# Patient Record
Sex: MALE | Race: WHITE | NOT HISPANIC OR LATINO
[De-identification: names, ages, dates, MRNs, and addresses within clinical notes are randomized per-mention and may not be internally consistent; named-entity substitution may affect disease eponyms.]

---

## 2017-03-14 ENCOUNTER — APPOINTMENT (OUTPATIENT)
Dept: FAMILY MEDICINE | Facility: CLINIC | Age: 77
End: 2017-03-14

## 2017-03-17 ENCOUNTER — APPOINTMENT (OUTPATIENT)
Dept: FAMILY MEDICINE | Facility: CLINIC | Age: 77
End: 2017-03-17

## 2017-03-17 VITALS
TEMPERATURE: 98 F | DIASTOLIC BLOOD PRESSURE: 68 MMHG | SYSTOLIC BLOOD PRESSURE: 116 MMHG | HEART RATE: 68 BPM | RESPIRATION RATE: 20 BRPM

## 2017-03-17 DIAGNOSIS — Z87.09 PERSONAL HISTORY OF OTHER DISEASES OF THE RESPIRATORY SYSTEM: ICD-10-CM

## 2017-12-21 ENCOUNTER — APPOINTMENT (OUTPATIENT)
Dept: FAMILY MEDICINE | Facility: CLINIC | Age: 77
End: 2017-12-21
Payer: MEDICARE

## 2017-12-21 VITALS
SYSTOLIC BLOOD PRESSURE: 114 MMHG | HEART RATE: 68 BPM | HEIGHT: 74 IN | DIASTOLIC BLOOD PRESSURE: 68 MMHG | BODY MASS INDEX: 23.74 KG/M2 | RESPIRATION RATE: 20 BRPM | WEIGHT: 185 LBS

## 2017-12-21 LAB
BASOPHILS # BLD AUTO: 0.01 K/UL
BASOPHILS NFR BLD AUTO: 0.1 %
EOSINOPHIL # BLD AUTO: 0.41 K/UL
EOSINOPHIL NFR BLD AUTO: 5 %
HCT VFR BLD CALC: 44 %
HGB BLD-MCNC: 14.4 G/DL
IMM GRANULOCYTES NFR BLD AUTO: 0.4 %
LYMPHOCYTES # BLD AUTO: 1.94 K/UL
LYMPHOCYTES NFR BLD AUTO: 23.5 %
MAN DIFF?: NORMAL
MCHC RBC-ENTMCNC: 26.7 PG
MCHC RBC-ENTMCNC: 32.7 GM/DL
MCV RBC AUTO: 81.5 FL
MONOCYTES # BLD AUTO: 0.66 K/UL
MONOCYTES NFR BLD AUTO: 8 %
NEUTROPHILS # BLD AUTO: 5.2 K/UL
NEUTROPHILS NFR BLD AUTO: 63 %
PLATELET # BLD AUTO: 233 K/UL
RBC # BLD: 5.4 M/UL
RBC # FLD: 14.1 %
WBC # FLD AUTO: 8.25 K/UL

## 2017-12-21 PROCEDURE — G0439: CPT

## 2017-12-21 PROCEDURE — 93000 ELECTROCARDIOGRAM COMPLETE: CPT | Mod: 59

## 2017-12-21 PROCEDURE — G0008: CPT

## 2017-12-21 PROCEDURE — 36415 COLL VENOUS BLD VENIPUNCTURE: CPT

## 2017-12-21 PROCEDURE — 90688 IIV4 VACCINE SPLT 0.5 ML IM: CPT

## 2017-12-21 PROCEDURE — G0009: CPT

## 2017-12-21 PROCEDURE — 90732 PPSV23 VACC 2 YRS+ SUBQ/IM: CPT

## 2017-12-21 RX ORDER — SULFACETAMIDE SODIUM AND SULFUR 9 %-4.5 %
9-4.5 KIT TOPICAL
Qty: 1 | Refills: 0 | Status: DISCONTINUED | COMMUNITY
Start: 2016-12-16

## 2017-12-30 LAB
ALBUMIN SERPL ELPH-MCNC: 4.6 G/DL
ALP BLD-CCNC: 54 U/L
ALT SERPL-CCNC: 25 U/L
ANION GAP SERPL CALC-SCNC: 19 MMOL/L
AST SERPL-CCNC: 32 U/L
BILIRUB SERPL-MCNC: 0.3 MG/DL
BUN SERPL-MCNC: 20 MG/DL
CALCIUM SERPL-MCNC: 9.5 MG/DL
CHLORIDE SERPL-SCNC: 101 MMOL/L
CHOLEST SERPL-MCNC: 193 MG/DL
CHOLEST/HDLC SERPL: 2.6 RATIO
CO2 SERPL-SCNC: 24 MMOL/L
CREAT SERPL-MCNC: 1.15 MG/DL
GLUCOSE SERPL-MCNC: 91 MG/DL
HBA1C MFR BLD HPLC: 5.5 %
HDLC SERPL-MCNC: 73 MG/DL
LDLC SERPL CALC-MCNC: 100 MG/DL
POTASSIUM SERPL-SCNC: 3.9 MMOL/L
PROT SERPL-MCNC: 7.2 G/DL
PSA FREE FLD-MCNC: 33
PSA FREE SERPL-MCNC: 1.31 NG/ML
PSA SERPL-MCNC: 3.97 NG/ML
SODIUM SERPL-SCNC: 144 MMOL/L
TRIGL SERPL-MCNC: 98 MG/DL

## 2018-05-30 ENCOUNTER — APPOINTMENT (OUTPATIENT)
Dept: SURGERY | Facility: CLINIC | Age: 78
End: 2018-05-30
Payer: MEDICARE

## 2018-05-30 VITALS — BODY MASS INDEX: 24.65 KG/M2 | WEIGHT: 186 LBS | HEIGHT: 73 IN

## 2018-05-30 DIAGNOSIS — Z80.8 FAMILY HISTORY OF MALIGNANT NEOPLASM OF OTHER ORGANS OR SYSTEMS: ICD-10-CM

## 2018-05-30 DIAGNOSIS — K62.9 DISEASE OF ANUS AND RECTUM, UNSPECIFIED: ICD-10-CM

## 2018-05-30 DIAGNOSIS — Z87.19 PERSONAL HISTORY OF OTHER DISEASES OF THE DIGESTIVE SYSTEM: ICD-10-CM

## 2018-05-30 PROCEDURE — 99214 OFFICE O/P EST MOD 30 MIN: CPT

## 2018-05-31 PROBLEM — K62.9 ANAL LESION: Status: ACTIVE | Noted: 2018-05-31

## 2018-12-24 ENCOUNTER — APPOINTMENT (OUTPATIENT)
Dept: FAMILY MEDICINE | Facility: CLINIC | Age: 78
End: 2018-12-24
Payer: MEDICARE

## 2018-12-24 VITALS
DIASTOLIC BLOOD PRESSURE: 70 MMHG | SYSTOLIC BLOOD PRESSURE: 125 MMHG | HEART RATE: 68 BPM | WEIGHT: 183 LBS | RESPIRATION RATE: 20 BRPM | HEIGHT: 73 IN | BODY MASS INDEX: 24.25 KG/M2

## 2018-12-24 PROCEDURE — 36415 COLL VENOUS BLD VENIPUNCTURE: CPT

## 2018-12-24 PROCEDURE — 90674 CCIIV4 VAC NO PRSV 0.5 ML IM: CPT

## 2018-12-24 PROCEDURE — 99397 PER PM REEVAL EST PAT 65+ YR: CPT | Mod: 25

## 2018-12-24 PROCEDURE — 93000 ELECTROCARDIOGRAM COMPLETE: CPT | Mod: 59

## 2018-12-24 PROCEDURE — G0008: CPT

## 2018-12-24 NOTE — HEALTH RISK ASSESSMENT
[No falls in past year] : Patient reported no falls in the past year [0] : 2) Feeling down, depressed, or hopeless: Not at all (0) [Fully functional (bathing, dressing, toileting, transferring, walking, feeding)] : Fully functional (bathing, dressing, toileting, transferring, walking, feeding) [Fully functional (using the telephone, shopping, preparing meals, housekeeping, doing laundry, using] : Fully functional and needs no help or supervision to perform IADLs (using the telephone, shopping, preparing meals, housekeeping, doing laundry, using transportation, managing medications and managing finances) [Discussed at today's visit] : Advance Directives Discussed at today's visit [Designated Healthcare Proxy] : Designated healthcare proxy [Relationship: ___] : Relationship: [unfilled] [DNR] : DNR [DNI] : DNI [] : No

## 2018-12-24 NOTE — PHYSICAL EXAM
[No Acute Distress] : no acute distress [Well Nourished] : well nourished [Well Developed] : well developed [Well-Appearing] : well-appearing [Normal Sclera/Conjunctiva] : normal sclera/conjunctiva [PERRL] : pupils equal round and reactive to light [EOMI] : extraocular movements intact [Normal Outer Ear/Nose] : the outer ears and nose were normal in appearance [Normal Oropharynx] : the oropharynx was normal [Normal TMs] : both tympanic membranes were normal [Normal Nasal Mucosa] : the nasal mucosa was normal [No JVD] : no jugular venous distention [Supple] : supple [No Lymphadenopathy] : no lymphadenopathy [Thyroid Normal, No Nodules] : the thyroid was normal and there were no nodules present [No Respiratory Distress] : no respiratory distress  [Clear to Auscultation] : lungs were clear to auscultation bilaterally [No Accessory Muscle Use] : no accessory muscle use [Normal Rate] : normal rate  [Regular Rhythm] : with a regular rhythm [Normal S1, S2] : normal S1 and S2 [No Murmur] : no murmur heard [No Carotid Bruits] : no carotid bruits [No Abdominal Bruit] : a ~M bruit was not heard ~T in the abdomen [No Varicosities] : no varicosities [Pedal Pulses Present] : the pedal pulses are present [No Edema] : there was no peripheral edema [No Extremity Clubbing/Cyanosis] : no extremity clubbing/cyanosis [No Palpable Aorta] : no palpable aorta [Soft] : abdomen soft [Non Tender] : non-tender [Non-distended] : non-distended [No Masses] : no abdominal mass palpated [No HSM] : no HSM [Normal Bowel Sounds] : normal bowel sounds [No Hernias] : no hernias [Normal Sphincter Tone] : normal sphincter tone [No Mass] : no mass [Penis Abnormality] : normal uncircumcised penis [Testes Tenderness] : no tenderness of the testes [Testes Mass (___cm)] : there were no testicular masses [No Prostate Nodules] : no prostate nodules [Prostate Enlarged] : was enlarged [Prostate Size___ (Scale 0-4)] : prostate size was [unfilled] on a scale of 0-4 [Normal Posterior Cervical Nodes] : no posterior cervical lymphadenopathy [Normal Femoral Nodes] : no femoral lymphadenopathy [Normal Anterior Cervical Nodes] : no anterior cervical lymphadenopathy [Normal Inguinal Nodes] : no inguinal lymphadenopathy [No CVA Tenderness] : no CVA  tenderness [No Spinal Tenderness] : no spinal tenderness [No Joint Swelling] : no joint swelling [Grossly Normal Strength/Tone] : grossly normal strength/tone [No Rash] : no rash [Normal Gait] : normal gait [Coordination Grossly Intact] : coordination grossly intact [No Focal Deficits] : no focal deficits [Speech Grossly Normal] : speech grossly normal [Memory Grossly Normal] : memory grossly normal [Normal Affect] : the affect was normal [Alert and Oriented x3] : oriented to person, place, and time [Normal Mood] : the mood was normal [Normal Insight/Judgement] : insight and judgment were intact [de-identified] : scattered seborrheic keratoses

## 2018-12-24 NOTE — HISTORY OF PRESENT ILLNESS
[FreeTextEntry1] : Requests comprehensive exam [de-identified] : Presents for comprehensive exam.  States feeling well; trying to watch diet; exercises regularly.  Recent Derm visit - await biopsies.  Reviewed immunizations and requests flu vaccine.

## 2018-12-24 NOTE — ASSESSMENT
[FreeTextEntry1] : Comprehensive exam reveals patient to be hemodynamically stable with acceptable BP\par No new issues identified\par Lab profiles sent\par Flu vaccine given L deltoid

## 2018-12-27 ENCOUNTER — RESULT REVIEW (OUTPATIENT)
Age: 78
End: 2018-12-27

## 2018-12-27 LAB
ALBUMIN SERPL ELPH-MCNC: 4.5 G/DL
ALP BLD-CCNC: 53 U/L
ALT SERPL-CCNC: 25 U/L
ANION GAP SERPL CALC-SCNC: 10 MMOL/L
APPEARANCE: CLEAR
AST SERPL-CCNC: 31 U/L
BASOPHILS # BLD AUTO: 0.01 K/UL
BASOPHILS NFR BLD AUTO: 0.2 %
BILIRUB SERPL-MCNC: 0.6 MG/DL
BILIRUBIN URINE: NEGATIVE
BLOOD URINE: NEGATIVE
BUN SERPL-MCNC: 17 MG/DL
CALCIUM SERPL-MCNC: 9.7 MG/DL
CHLORIDE SERPL-SCNC: 101 MMOL/L
CHOLEST SERPL-MCNC: 192 MG/DL
CHOLEST/HDLC SERPL: 2.7 RATIO
CO2 SERPL-SCNC: 29 MMOL/L
COLOR: YELLOW
CREAT SERPL-MCNC: 1.08 MG/DL
EOSINOPHIL # BLD AUTO: 0.22 K/UL
EOSINOPHIL NFR BLD AUTO: 3.4 %
GLUCOSE QUALITATIVE U: NEGATIVE MG/DL
GLUCOSE SERPL-MCNC: 104 MG/DL
HBA1C MFR BLD HPLC: 5.6 %
HCT VFR BLD CALC: 45.4 %
HDLC SERPL-MCNC: 71 MG/DL
HGB BLD-MCNC: 14.7 G/DL
IMM GRANULOCYTES NFR BLD AUTO: 0.2 %
KETONES URINE: NEGATIVE
LDLC SERPL CALC-MCNC: 105 MG/DL
LEUKOCYTE ESTERASE URINE: NEGATIVE
LYMPHOCYTES # BLD AUTO: 1.76 K/UL
LYMPHOCYTES NFR BLD AUTO: 27.2 %
MAN DIFF?: NORMAL
MCHC RBC-ENTMCNC: 26.5 PG
MCHC RBC-ENTMCNC: 32.4 GM/DL
MCV RBC AUTO: 81.8 FL
MONOCYTES # BLD AUTO: 0.45 K/UL
MONOCYTES NFR BLD AUTO: 6.9 %
NEUTROPHILS # BLD AUTO: 4.03 K/UL
NEUTROPHILS NFR BLD AUTO: 62.1 %
NITRITE URINE: NEGATIVE
PH URINE: 6
PLATELET # BLD AUTO: 239 K/UL
POTASSIUM SERPL-SCNC: 4.4 MMOL/L
PROT SERPL-MCNC: 6.8 G/DL
PROTEIN URINE: NEGATIVE MG/DL
PSA SERPL-MCNC: 3.96 NG/ML
RBC # BLD: 5.55 M/UL
RBC # FLD: 14.2 %
SODIUM SERPL-SCNC: 140 MMOL/L
SPECIFIC GRAVITY URINE: 1.02
TRIGL SERPL-MCNC: 81 MG/DL
UROBILINOGEN URINE: NEGATIVE MG/DL
WBC # FLD AUTO: 6.48 K/UL

## 2019-01-10 ENCOUNTER — RX RENEWAL (OUTPATIENT)
Age: 79
End: 2019-01-10

## 2020-01-22 ENCOUNTER — FORM ENCOUNTER (OUTPATIENT)
Age: 80
End: 2020-01-22

## 2020-01-23 ENCOUNTER — OUTPATIENT (OUTPATIENT)
Dept: OUTPATIENT SERVICES | Facility: HOSPITAL | Age: 80
LOS: 1 days | End: 2020-01-23
Payer: MEDICARE

## 2020-01-23 ENCOUNTER — APPOINTMENT (OUTPATIENT)
Dept: ULTRASOUND IMAGING | Facility: HOSPITAL | Age: 80
End: 2020-01-23
Payer: MEDICARE

## 2020-01-23 DIAGNOSIS — Z00.8 ENCOUNTER FOR OTHER GENERAL EXAMINATION: ICD-10-CM

## 2020-01-23 PROCEDURE — 76770 US EXAM ABDO BACK WALL COMP: CPT

## 2020-01-23 PROCEDURE — 76770 US EXAM ABDO BACK WALL COMP: CPT | Mod: 26

## 2020-01-24 ENCOUNTER — APPOINTMENT (OUTPATIENT)
Dept: FAMILY MEDICINE | Facility: CLINIC | Age: 80
End: 2020-01-24
Payer: MEDICARE

## 2020-01-24 VITALS — DIASTOLIC BLOOD PRESSURE: 70 MMHG | SYSTOLIC BLOOD PRESSURE: 118 MMHG | RESPIRATION RATE: 20 BRPM | HEART RATE: 68 BPM

## 2020-01-24 DIAGNOSIS — Z87.438 PERSONAL HISTORY OF OTHER DISEASES OF MALE GENITAL ORGANS: ICD-10-CM

## 2020-01-24 PROCEDURE — 99213 OFFICE O/P EST LOW 20 MIN: CPT | Mod: 25

## 2020-01-24 PROCEDURE — 36415 COLL VENOUS BLD VENIPUNCTURE: CPT

## 2020-01-24 NOTE — HISTORY OF PRESENT ILLNESS
[FreeTextEntry8] : Presents with wife on acute basis - was on vacation and went into urinary retention.  Initially seen in hospital in [facility] the followed up with Dr. Soler - reviewed US done yesterday; states needs labs.  Note has indwelling catheter to leg bag at present.  Denies pain.

## 2020-01-24 NOTE — PHYSICAL EXAM
[No Acute Distress] : no acute distress [Supple] : supple [No Edema] : there was no peripheral edema [Normal] : normal rate, regular rhythm, normal S1 and S2 and no murmur heard [Alert and Oriented x3] : oriented to person, place, and time [No Focal Deficits] : no focal deficits

## 2020-01-25 LAB
ALBUMIN SERPL ELPH-MCNC: 4.2 G/DL
ALP BLD-CCNC: 48 U/L
ALT SERPL-CCNC: 28 U/L
ANION GAP SERPL CALC-SCNC: 15 MMOL/L
AST SERPL-CCNC: 31 U/L
BASOPHILS # BLD AUTO: 0.03 K/UL
BASOPHILS NFR BLD AUTO: 0.3 %
BILIRUB SERPL-MCNC: 0.2 MG/DL
BUN SERPL-MCNC: 17 MG/DL
CALCIUM SERPL-MCNC: 9.5 MG/DL
CHLORIDE SERPL-SCNC: 100 MMOL/L
CO2 SERPL-SCNC: 24 MMOL/L
CREAT SERPL-MCNC: 1.21 MG/DL
EOSINOPHIL # BLD AUTO: 0.34 K/UL
EOSINOPHIL NFR BLD AUTO: 4 %
GLUCOSE SERPL-MCNC: 91 MG/DL
HCT VFR BLD CALC: 46.1 %
HGB BLD-MCNC: 14.6 G/DL
IMM GRANULOCYTES NFR BLD AUTO: 0.2 %
LYMPHOCYTES # BLD AUTO: 2.07 K/UL
LYMPHOCYTES NFR BLD AUTO: 24.1 %
MAN DIFF?: NORMAL
MCHC RBC-ENTMCNC: 26.1 PG
MCHC RBC-ENTMCNC: 31.7 GM/DL
MCV RBC AUTO: 82.3 FL
MONOCYTES # BLD AUTO: 0.5 K/UL
MONOCYTES NFR BLD AUTO: 5.8 %
NEUTROPHILS # BLD AUTO: 5.63 K/UL
NEUTROPHILS NFR BLD AUTO: 65.6 %
PLATELET # BLD AUTO: 335 K/UL
POTASSIUM SERPL-SCNC: 4 MMOL/L
PROT SERPL-MCNC: 6.7 G/DL
PSA FREE FLD-MCNC: 25 %
PSA FREE SERPL-MCNC: 2.17 NG/ML
PSA SERPL-MCNC: 8.66 NG/ML
RBC # BLD: 5.6 M/UL
RBC # FLD: 13.2 %
SODIUM SERPL-SCNC: 139 MMOL/L
WBC # FLD AUTO: 8.59 K/UL

## 2020-03-05 ENCOUNTER — EMERGENCY (EMERGENCY)
Facility: HOSPITAL | Age: 80
LOS: 1 days | Discharge: HOME | End: 2020-03-05
Attending: EMERGENCY MEDICINE | Admitting: EMERGENCY MEDICINE
Payer: MEDICARE

## 2020-03-05 VITALS
SYSTOLIC BLOOD PRESSURE: 147 MMHG | RESPIRATION RATE: 17 BRPM | TEMPERATURE: 98 F | HEART RATE: 82 BPM | WEIGHT: 171.96 LBS | DIASTOLIC BLOOD PRESSURE: 87 MMHG | OXYGEN SATURATION: 99 %

## 2020-03-05 DIAGNOSIS — Z98.890 OTHER SPECIFIED POSTPROCEDURAL STATES: Chronic | ICD-10-CM

## 2020-03-05 DIAGNOSIS — T83.091A OTHER MECHANICAL COMPLICATION OF INDWELLING URETHRAL CATHETER, INITIAL ENCOUNTER: ICD-10-CM

## 2020-03-05 PROCEDURE — 99283 EMERGENCY DEPT VISIT LOW MDM: CPT | Mod: 25

## 2020-03-05 PROCEDURE — 99284 EMERGENCY DEPT VISIT MOD MDM: CPT

## 2020-03-05 PROCEDURE — 51702 INSERT TEMP BLADDER CATH: CPT

## 2020-03-05 NOTE — ED PROVIDER NOTE - PATIENT PORTAL LINK FT
You can access the FollowMyHealth Patient Portal offered by [facility] by registering at the following website: [email or web address]. By joining [facility]’s FollowMyHealth portal, you will also be able to view your health information using other applications (apps) compatible with our system.

## 2020-03-05 NOTE — ED PROVIDER NOTE - NSFOLLOWUPINSTRUCTIONS_ED_ALL_ED_FT
Yin Catheter Placement and Care    WHAT YOU NEED TO KNOW:    What is a Yin catheter? A Yin catheter is a sterile tube that is inserted into your bladder to drain urine. It is also called an indwelling urinary catheter. The tip of the catheter has a small balloon filled with solution that holds the catheter in your bladder.               How is a Yin catheter placed?     Your healthcare provider will clean your genital area with a sterile solution. He or she will put lubricant jelly on the catheter to help it go in smoothly. The end of the catheter with the deflated balloon will be inserted into your urethra. The catheter will be moved slowly and gently into your bladder. You may be asked to take slow, deep breaths or to push as if you were trying to urinate as the catheter is inserted.      When your healthcare provider sees urine flowing from the catheter, he or she will fill the balloon at the end of the catheter. The balloon holds the catheter in place so it does not come out. Your healthcare provider will attach the open end of the catheter to a sterile drainage bag.    How do I care for my Yin catheter?     Clean your genital area 2 times every day. Clean your catheter and the area around where it was inserted. Use soap and water. Clean your anal opening and catheter area after every bowel movement.       Secure the catheter tube so you do not pull or move the catheter. This helps prevent pain and bladder spasms. Healthcare providers will show you how to use medical tape or a strap to secure the catheter tube to your body.       Keep a closed drainage system. Your Yin catheter should always be attached to the drainage bag to form a closed system. Do not disconnect any part of the closed system unless you need to change the bag.    How do I care for my drainage bag?     Ask if a leg bag is right for you. A leg bag can be worn under your clothes. Ask your healthcare provider for more information about a leg bag.       Keep the drainage bag below the level of your waist. This helps stop urine from moving back up the tubing and into your bladder. Do not loop or kink the tubing. This can cause urine to back up and collect in your bladder. Do not let the drainage bag touch or lie on the floor.      Empty the drainage bag when needed. The weight of a full drainage bag can be painful. Empty the drainage bag every 3 to 6 hours or when it is ? full.       Clean and change the drainage bag as directed. Ask your healthcare provider how often you should change the drainage bag and what cleaning solution to use. Wear disposable gloves when you change the bag. Do not allow the end of the catheter or tubing to touch anything. Clean the ends with an alcohol pad before you reconnect them.    What can I do if problems develop?    No urine is draining into the bag:   Check for kinks in the tubing and straighten them out.       Check the tape or strap used to secure the catheter tube to your skin. Make sure it is not blocking the tube.       Make sure you are not sitting or lying on the tubing.      Make sure the urine bag is hanging below the level of your waist.      Urine leaks from or around the catheter, tubing, or drainage bag: Check if the closed drainage system has accidently come open or apart. Clean the catheter and tubing ends with a new alcohol pad and reconnect them.     What are the risks of a Yin catheter? You may develop an infection caused by bacteria. This can happen when the drainage system is opened and bacteria get inside the tubing. You can also get an infection if the catheter equipment is not cleaned well or you do not wash your hands. The infection can spread to your bladder or other organs, and may become severe.    How can I help prevent an infection?     Wash your hands often. Wash before and after you touch your catheter, tubing, or drainage bag. Use soap and water. Wear clean disposable gloves when you care for your catheter or disconnect the drainage bag. Wash your hands before you prepare or eat food. Handwashing           Drink liquids as directed. Ask your healthcare provider how much liquid to drink each day and which liquids are best for you. Liquids will help flush your kidneys and bladder to help prevent infection.    When should I seek immediate care?     Your catheter comes out.       You suddenly have material that looks like sand in the tubing or drainage bag.      No urine is draining into the bag and you have checked the system.      You have pain in your hip, back, pelvis, or lower abdomen.      You are confused or cannot think clearly.    When should I call my doctor?     You have a fever.      You have bladder spasms for more than 1 day after the catheter is placed.      You see blood in the tubing or drainage bag.      You have a rash or itching where the catheter tube is secured to your skin.      Urine leaks from or around the catheter, tubing, or drainage bag.      The closed drainage system has accidently come open or apart.       You see a layer of crystals inside the tubing.      You have questions or concerns about your condition or care.    CARE AGREEMENT:    You have the right to help plan your care. Learn about your health condition and how it may be treated. Discuss treatment options with your healthcare providers to decide what care you want to receive. You always have the right to refuse treatment.        © Copyright [employer] 2020       back to top                      © Copyright [employer] 2020

## 2020-03-05 NOTE — ED ADULT TRIAGE NOTE - CHIEF COMPLAINT QUOTE
pt presents to ED with c/o catheter complications. His urologist called and asked Dr. Colletta to place a indwelling catheter in the patient and have the pt f/u with urology

## 2020-03-05 NOTE — ED ADULT NURSE NOTE - OBJECTIVE STATEMENT
79 yr old male ambulatory to ED from home c/o catheter complications. Pt states my urologist called and asked Dr. Colletta to place a indwelling catheter.

## 2020-03-05 NOTE — ED PROVIDER NOTE - OBJECTIVE STATEMENT
79 yr old male with hx of HTN, HLD, BPH, presents with urinary retention since 6 pm. Pt reports recently had Reaves placed, removed 2 days ago and pt has been self catheterizing since. pt successful self catheterized 6 times today, and then trouble at 6 pm today. + urinary retention. Pt called urologist- Dr. Schaefer and was sent to ED for reaves. No fever, chills, abdominal pain or any other symptoms.

## 2020-03-05 NOTE — ED PROVIDER NOTE - ATTENDING CONTRIBUTION TO CARE
I personally evaluated the patient. I reviewed the Resident’s or Physician Assistant’s note (as assigned above), and agree with the findings and plan except as documented in my note.    Patient has urinary retention    PE: distended bladder     reaves inserted

## 2020-03-05 NOTE — ED PROVIDER NOTE - CARE PROVIDER_API CALL
Neptali Schaefer)  Urology  [address]  [facility], [address]  [place], [place] [id number]  Phone: [phone]  Fax: [phone]  Follow Up Time:

## 2020-03-05 NOTE — ED PROVIDER NOTE - CLINICAL SUMMARY MEDICAL DECISION MAKING FREE TEXT BOX
79 yr old male with hx of HTN, HLD, BPH, presents with urinary retention since 6 pm. Pt reports recently had Reaves placed, removed 2 days ago and pt has been self catheterizing since. pt successful self catheterized 6 times today, and then trouble at 6 pm today. + urinary retention. Pt called urologist- Dr. Schaefer and was sent to ED for reaves. No fever, chills, abdominal pain or any other symptoms.    abdomen soft non tender, reaves placed, working well. pt stable for dc with reaves and to follow up with elton tomorrow. Pt agrees with plan

## 2020-06-22 PROBLEM — E78.5 HYPERLIPIDEMIA, UNSPECIFIED: Chronic | Status: ACTIVE | Noted: 2020-03-05

## 2020-06-22 PROBLEM — I10 ESSENTIAL (PRIMARY) HYPERTENSION: Chronic | Status: ACTIVE | Noted: 2020-03-05

## 2020-06-22 PROBLEM — C43.9 MALIGNANT MELANOMA OF SKIN, UNSPECIFIED: Chronic | Status: ACTIVE | Noted: 2020-03-05

## 2020-06-26 ENCOUNTER — APPOINTMENT (OUTPATIENT)
Dept: UROLOGY | Facility: CLINIC | Age: 80
End: 2020-06-26
Payer: MEDICARE

## 2020-06-26 VITALS
DIASTOLIC BLOOD PRESSURE: 70 MMHG | WEIGHT: 174 LBS | BODY MASS INDEX: 23.06 KG/M2 | TEMPERATURE: 97.6 F | HEIGHT: 73 IN | HEART RATE: 79 BPM | OXYGEN SATURATION: 98 % | SYSTOLIC BLOOD PRESSURE: 110 MMHG

## 2020-06-26 DIAGNOSIS — R33.8 OTHER RETENTION OF URINE: ICD-10-CM

## 2020-06-26 PROCEDURE — 99204 OFFICE O/P NEW MOD 45 MIN: CPT

## 2020-06-26 NOTE — PHYSICAL EXAM
[General Appearance - Well Nourished] : well nourished [General Appearance - Well Developed] : well developed [Normal Appearance] : normal appearance [Well Groomed] : well groomed [General Appearance - In No Acute Distress] : no acute distress [Edema] : no peripheral edema [Respiration, Rhythm And Depth] : normal respiratory rhythm and effort [Exaggerated Use Of Accessory Muscles For Inspiration] : no accessory muscle use [Abdomen Soft] : soft [Abdomen Tenderness] : non-tender [Costovertebral Angle Tenderness] : no ~M costovertebral angle tenderness [Scrotum] : the scrotum was normal [Urinary Bladder Findings] : the bladder was normal on palpation [Epididymis] : the epididymides were normal [Urethral Meatus] : meatus normal [Testes Mass (___cm)] : there were no testicular masses [Testes Tenderness] : no tenderness of the testes [Prostate Tenderness] : the prostate was not tender [No Prostate Nodules] : no prostate nodules [Prostate Size ___ gm] : prostate size [unfilled] gm [] : no rash [Normal Station and Gait] : the gait and station were normal for the patient's age [Oriented To Time, Place, And Person] : oriented to person, place, and time [No Focal Deficits] : no focal deficits [Affect] : the affect was normal [Not Anxious] : not anxious [Mood] : the mood was normal [No Palpable Adenopathy] : no palpable adenopathy [Penis Abnormality] : normal uncircumcised penis [FreeTextEntry1] : Yin in place

## 2020-06-26 NOTE — LETTER BODY
[Please see my note below.] : Please see my note below. [Dear  ___] : Dear ~ASHLEY, [Consult Letter:] : I had the pleasure of evaluating your patient, [unfilled]. [Sincerely,] : Sincerely, [FreeTextEntry2] : Dr. Mo Soler MD\par [address] [facility], [address]\[place] [address]\par [phone] (Phone)\par [phone] (Fax)\par  [Consult Closing:] : Thank you very much for allowing me to participate in the care of this patient.  If you have any questions, please do not hesitate to contact me. [FreeTextEntry3] : Dominique Mcgrath M.D.\par

## 2020-06-26 NOTE — HISTORY OF PRESENT ILLNESS
[Urinary Retention] : urinary retention [Urinary Incontinence] : urinary incontinence [Nocturia] : nocturia [Intermittency] : intermittency [Straining] : straining [Weak Stream] : weak stream [Erectile Dysfunction] : Erectile Dysfunction [Post-Void Dribbling] : post-void dribbling [None] : None [FreeTextEntry1] : Referred by Dr. Soler.\par \par 79y/o man\par Elevated PSA, BPH, urinary retention.\par Prostate biopsy x 2 both were benign.\par Developed urinary retention in January 2020 retention with > 3500cc, and UDS noted a hypotonic bladder. He has been managed with a Iyn for the past 6 months. Recent UDS now shows adequate bladder contraction. \par Prostate size is approximately 110cc by ultrasound.\par \par Other PMH/PSH: Melanoma, elevated Cholesterol, HTN, Erectile dysfunction\par Meds: Avodart, flomax, viagra, Lisinopril, Zocor, \par \par \par \par PSA history\par 8.66 (% free 25 %) Jan 2020\par 3.96 Dec 2018\par 3.97 Dec 2017\par 4.13 Nov 2016

## 2020-06-26 NOTE — ASSESSMENT
[FreeTextEntry1] : BPH, urinary retention\par \par Discussed Laser enucleation of prostate with morcellation (HOLEP/ThuLEP).\par \par Indications, options including chronic Yin catheter, suprapubic catheter, intermittent self-catheterization, transurethral resection of prostate, transurethral vaporization of prostate with laser or electrocautery, open or laparoscopic enucleation of the prostate, all discussed.\par \par Potential complications of transurethral holmium or thulium laser enucleation of prostate with morcellation discussed. This included risk of infection, bleeding, transfusion, conversion to open enucleation, need for staged procedure, adjacent organ injury, bladder injury, clot retention of urine requiring return to operating room for cystoscopy clot evacuation, prolonged duration of Yin catheterization, urethral stricture, transient or permanent urinary incontinence, retrograde ejaculation is a permanent and irreversible complication, redo or staged surgery due to equipment malfunction, anesthetic complications, cardiac complications, all discussed. \par \par Printed information including of the above and other more uncommon complications provided to patient.\par \par We'll schedule.\par

## 2020-08-18 ENCOUNTER — OUTPATIENT (OUTPATIENT)
Dept: OUTPATIENT SERVICES | Facility: HOSPITAL | Age: 80
LOS: 1 days | End: 2020-08-18
Payer: MEDICARE

## 2020-08-18 VITALS
OXYGEN SATURATION: 98 % | TEMPERATURE: 97 F | HEART RATE: 94 BPM | HEIGHT: 73 IN | DIASTOLIC BLOOD PRESSURE: 79 MMHG | RESPIRATION RATE: 20 BRPM | SYSTOLIC BLOOD PRESSURE: 134 MMHG | WEIGHT: 179.9 LBS

## 2020-08-18 DIAGNOSIS — N40.1 BENIGN PROSTATIC HYPERPLASIA WITH LOWER URINARY TRACT SYMPTOMS: ICD-10-CM

## 2020-08-18 DIAGNOSIS — R97.20 ELEVATED PROSTATE SPECIFIC ANTIGEN [PSA]: ICD-10-CM

## 2020-08-18 DIAGNOSIS — R33.8 OTHER RETENTION OF URINE: ICD-10-CM

## 2020-08-18 DIAGNOSIS — Z98.890 OTHER SPECIFIED POSTPROCEDURAL STATES: Chronic | ICD-10-CM

## 2020-08-18 DIAGNOSIS — Z01.818 ENCOUNTER FOR OTHER PREPROCEDURAL EXAMINATION: ICD-10-CM

## 2020-08-18 LAB
ANION GAP SERPL CALC-SCNC: 11 MMOL/L — SIGNIFICANT CHANGE UP (ref 5–17)
BLD GP AB SCN SERPL QL: NEGATIVE — SIGNIFICANT CHANGE UP
BUN SERPL-MCNC: 19 MG/DL — SIGNIFICANT CHANGE UP (ref 7–23)
CALCIUM SERPL-MCNC: 9.8 MG/DL — SIGNIFICANT CHANGE UP (ref 8.4–10.5)
CHLORIDE SERPL-SCNC: 101 MMOL/L — SIGNIFICANT CHANGE UP (ref 96–108)
CO2 SERPL-SCNC: 26 MMOL/L — SIGNIFICANT CHANGE UP (ref 22–31)
CREAT SERPL-MCNC: 1.01 MG/DL — SIGNIFICANT CHANGE UP (ref 0.5–1.3)
GLUCOSE SERPL-MCNC: 123 MG/DL — HIGH (ref 70–99)
HCT VFR BLD CALC: 46.3 % — SIGNIFICANT CHANGE UP (ref 39–50)
HGB BLD-MCNC: 14.3 G/DL — SIGNIFICANT CHANGE UP (ref 13–17)
MCHC RBC-ENTMCNC: 25.7 PG — LOW (ref 27–34)
MCHC RBC-ENTMCNC: 30.9 GM/DL — LOW (ref 32–36)
MCV RBC AUTO: 83.3 FL — SIGNIFICANT CHANGE UP (ref 80–100)
NRBC # BLD: 0 /100 WBCS — SIGNIFICANT CHANGE UP (ref 0–0)
PLATELET # BLD AUTO: 233 K/UL — SIGNIFICANT CHANGE UP (ref 150–400)
POTASSIUM SERPL-MCNC: 4.2 MMOL/L — SIGNIFICANT CHANGE UP (ref 3.5–5.3)
POTASSIUM SERPL-SCNC: 4.2 MMOL/L — SIGNIFICANT CHANGE UP (ref 3.5–5.3)
RBC # BLD: 5.56 M/UL — SIGNIFICANT CHANGE UP (ref 4.2–5.8)
RBC # FLD: 14 % — SIGNIFICANT CHANGE UP (ref 10.3–14.5)
RH IG SCN BLD-IMP: NEGATIVE — SIGNIFICANT CHANGE UP
SODIUM SERPL-SCNC: 138 MMOL/L — SIGNIFICANT CHANGE UP (ref 135–145)
WBC # BLD: 6.85 K/UL — SIGNIFICANT CHANGE UP (ref 3.8–10.5)
WBC # FLD AUTO: 6.85 K/UL — SIGNIFICANT CHANGE UP (ref 3.8–10.5)

## 2020-08-18 PROCEDURE — 86850 RBC ANTIBODY SCREEN: CPT

## 2020-08-18 PROCEDURE — 80048 BASIC METABOLIC PNL TOTAL CA: CPT

## 2020-08-18 PROCEDURE — 87086 URINE CULTURE/COLONY COUNT: CPT

## 2020-08-18 PROCEDURE — 86901 BLOOD TYPING SEROLOGIC RH(D): CPT

## 2020-08-18 PROCEDURE — 86900 BLOOD TYPING SEROLOGIC ABO: CPT

## 2020-08-18 PROCEDURE — 85027 COMPLETE CBC AUTOMATED: CPT

## 2020-08-18 PROCEDURE — G0463: CPT

## 2020-08-18 NOTE — H&P PST ADULT - NSICDXPASTMEDICALHX_GEN_ALL_CORE_FT
PAST MEDICAL HISTORY:  Enlarged prostate with lower urinary tract symptoms (LUTS)     Yin catheter status     HLD (hyperlipidemia)     HTN (hypertension)     Melanoma     Urinary retention     Urinary retention

## 2020-08-18 NOTE — H&P PST ADULT - RS GEN PE MLT RESP DETAILS PC
clear to auscultation bilaterally/airway patent/normal/good air movement/breath sounds equal/respirations non-labored

## 2020-08-18 NOTE — H&P PST ADULT - NSICDXPROBLEM_GEN_ALL_CORE_FT
PROBLEM DIAGNOSES  Problem: Enlarged prostate with lower urinary tract symptoms (LUTS)  Assessment and Plan: Cystoscopy ,Laser Enucleation Of Prostate with Morcellation on 8/25/20  Pre- Op Instructions discussed   Labs sent ,UX sent   Covid test on 8/22/20  pt will continue current HTN meds the day of surgery

## 2020-08-18 NOTE — H&P PST ADULT - BP NONINVASIVE SYSTOLIC (MM HG)
Occupational Therapy Daily Treatment Note     Date: 8/7/2020  Name: Kira Johnston  Clinic Number: [id number]    Therapy Diagnosis:   Encounter Diagnosis   Name Primary?    Pain of both elbows      Physician: Sarah Cam PA-C    Medical Diagnosis: M77.11,M77.12 (ICD-10-CM) - Lateral epicondylitis of both elbows  Physician Orders: Eval/tx  Evaluation Date: 7/7/2020  Plan of Care Certification Date: 10/7/2020  Authorization Period: 12/31/2020  Date of Return to MD: EDGAR     Visit #: 3 of 10  Time In: 11:00 AM  Time Out: 11:45 AM     Precautions: Standard    Subjective     Pt reports: Improved symptoms  Response to previous treatment:Adriana well    Pain: 5/10  Location: Left elbow    Objective     Kira received the following supervised modalities after being cleared for contraindications for 10 minutes:   -MH to both elbows     Kira participated in dynamic functional therapeutic activities to improve functional performance for  minutes, including:  -AROM FA 3 positions 20 BUE's  -WF stretches 10 count x 10 BUE's  -Patient received ultrasound  for pain control and decreased inflammation @ 100 % duty cycle, 3.3 Mhz, applied to both lateral elbows, intensity = 1.0 w/cm2 for 16 minutes.  --Soft tissue mobs to lateral elbows    Home Exercises and Education Provided     Education provided:   - Progress towards goals     Written Home Exercises Provided: Patient instructed to cont prior HEP.  Exercises were reviewed and Kira was able to demonstrate them prior to the end of the session.  Kira demonstrated good  understanding of the HEP provided.   .   See EMR under Patient Instructions for exercises provided prior visit.        Assessment     Pt would continue to benefit from skilled OT to maximize BUE function.     Kira is progressing towards her goals and there are no updates to goals at this time. Pt prognosis is Good.     Pt will continue to benefit from skilled outpatient occupational therapy to address the deficits  listed in the problem list on initial evaluation provide pt/family education and to maximize pt's level of independence in the home and community environment.       Pt's spiritual, cultural and educational needs considered and pt agreeable to plan of care and goals.    Goals:  LTG's (12 weeks):  1)   Increase  strength 15 lbs. to grasp cooking pot handle  2)   Decrease complaints of pain to  5 out of 10 at worst to increase functional hand use for ADL/work/leisure activities..  3)   Pt will return to near to prior level of function for ADLs and household management reporting I or Mod I with ADLs (dressing, feeding, grooming, toileting).      STG's (6 weeks)  1)   Patient to be IND with HEP and modalities for pain/edema managment.  2)   Increase  strength 5 lbs. to improve functional grasp for ADLs/work/leisure activities.   3)   Patient to be IND wiht Orthotic use, wear and care precautions.   4)   Decrease complaints of pain to  7 out of 10 at worst to increase functional hand use for ADL/work/leisure activities.    Plan   Cont OT to address above goals.        CHARLES Castillo OTR/L, CHT      [id number]

## 2020-08-18 NOTE — H&P PST ADULT - HISTORY OF PRESENT ILLNESS
81 y/o male with  with PMH of HTN,  HLD, Elevated PSA, BPH, urinary retention on reaves since 1/202 , s/p Prostate biopsy x 2 both were benign.  Pt reports that he developed urinary retention in January 2020  . He has been managed with a Reaves for the past 6 months followed by urology . Presents to PST for scheduled Cystoscopy ,Laser Enucleation Of Prostate with Morcellation on 8/25/20.    Covid test on 8/22/20

## 2020-08-19 LAB
CULTURE RESULTS: SIGNIFICANT CHANGE UP
SPECIMEN SOURCE: SIGNIFICANT CHANGE UP

## 2020-08-21 ENCOUNTER — APPOINTMENT (OUTPATIENT)
Dept: FAMILY MEDICINE | Facility: CLINIC | Age: 80
End: 2020-08-21
Payer: MEDICARE

## 2020-08-21 VITALS
HEART RATE: 76 BPM | RESPIRATION RATE: 20 BRPM | WEIGHT: 181 LBS | HEIGHT: 73 IN | DIASTOLIC BLOOD PRESSURE: 70 MMHG | BODY MASS INDEX: 23.99 KG/M2 | SYSTOLIC BLOOD PRESSURE: 124 MMHG

## 2020-08-21 DIAGNOSIS — Z01.818 ENCOUNTER FOR OTHER PREPROCEDURAL EXAMINATION: ICD-10-CM

## 2020-08-21 PROCEDURE — 93000 ELECTROCARDIOGRAM COMPLETE: CPT

## 2020-08-21 PROCEDURE — 99214 OFFICE O/P EST MOD 30 MIN: CPT | Mod: 25

## 2020-08-21 NOTE — PHYSICAL EXAM
[No Acute Distress] : no acute distress [No Edema] : there was no peripheral edema [Normal Posterior Cervical Nodes] : no posterior cervical lymphadenopathy [Normal] : soft, non-tender, non-distended, no masses palpated, no HSM and normal bowel sounds [Normal Anterior Cervical Nodes] : no anterior cervical lymphadenopathy [Coordination Grossly Intact] : coordination grossly intact [No Focal Deficits] : no focal deficits [Normal Gait] : normal gait [Alert and Oriented x3] : oriented to person, place, and time

## 2020-08-21 NOTE — HISTORY OF PRESENT ILLNESS
[No Pertinent Cardiac History] : no history of aortic stenosis, atrial fibrillation, coronary artery disease, recent myocardial infarction, or implantable device/pacemaker [No Pertinent Pulmonary History] : no history of asthma, COPD, sleep apnea, or smoking [No Adverse Anesthesia Reaction] : no adverse anesthesia reaction in self or family member [(Patient denies any chest pain, claudication, dyspnea on exertion, orthopnea, palpitations or syncope)] : Patient denies any chest pain, claudication, dyspnea on exertion, orthopnea, palpitations or syncope [Chronic Anticoagulation] : no chronic anticoagulation [Chronic Kidney Disease] : no chronic kidney disease [Diabetes] : no diabetes [FreeTextEntry1] : TURP [FreeTextEntry2] : 8/25/20 [FreeTextEntry5] : Patient has well controlled hypertension, hyperlipidemia [FreeTextEntry4] : Presents for presurgical clearance.  Denies recent illness, cough, temp elevation.  Note has indwelling reaves to leg bag. [FreeTextEntry3] : Dr. Mcgrath

## 2020-08-21 NOTE — RESULTS/DATA
[] : results reviewed [de-identified] : acceptable [de-identified] : acceptable [de-identified] : normal tracing for this patient

## 2020-08-22 ENCOUNTER — APPOINTMENT (OUTPATIENT)
Dept: DISASTER EMERGENCY | Facility: CLINIC | Age: 80
End: 2020-08-22

## 2020-08-23 LAB — SARS-COV-2 N GENE NPH QL NAA+PROBE: NOT DETECTED

## 2020-08-24 ENCOUNTER — TRANSCRIPTION ENCOUNTER (OUTPATIENT)
Age: 80
End: 2020-08-24

## 2020-08-25 ENCOUNTER — RESULT REVIEW (OUTPATIENT)
Age: 80
End: 2020-08-25

## 2020-08-25 ENCOUNTER — APPOINTMENT (OUTPATIENT)
Dept: UROLOGY | Facility: HOSPITAL | Age: 80
End: 2020-08-25

## 2020-08-25 ENCOUNTER — OUTPATIENT (OUTPATIENT)
Dept: OUTPATIENT SERVICES | Facility: HOSPITAL | Age: 80
LOS: 1 days | End: 2020-08-25
Payer: MEDICARE

## 2020-08-25 VITALS
RESPIRATION RATE: 18 BRPM | OXYGEN SATURATION: 95 % | SYSTOLIC BLOOD PRESSURE: 120 MMHG | WEIGHT: 179.9 LBS | HEIGHT: 73 IN | TEMPERATURE: 98 F | DIASTOLIC BLOOD PRESSURE: 83 MMHG | HEART RATE: 65 BPM

## 2020-08-25 DIAGNOSIS — R33.8 OTHER RETENTION OF URINE: ICD-10-CM

## 2020-08-25 DIAGNOSIS — Z98.890 OTHER SPECIFIED POSTPROCEDURAL STATES: Chronic | ICD-10-CM

## 2020-08-25 DIAGNOSIS — N40.1 BENIGN PROSTATIC HYPERPLASIA WITH LOWER URINARY TRACT SYMPTOMS: ICD-10-CM

## 2020-08-25 DIAGNOSIS — R97.20 ELEVATED PROSTATE SPECIFIC ANTIGEN [PSA]: ICD-10-CM

## 2020-08-25 LAB
ANION GAP SERPL CALC-SCNC: 14 MMOL/L — SIGNIFICANT CHANGE UP (ref 5–17)
BUN SERPL-MCNC: 20 MG/DL — SIGNIFICANT CHANGE UP (ref 7–23)
CALCIUM SERPL-MCNC: 8.9 MG/DL — SIGNIFICANT CHANGE UP (ref 8.4–10.5)
CHLORIDE SERPL-SCNC: 104 MMOL/L — SIGNIFICANT CHANGE UP (ref 96–108)
CO2 SERPL-SCNC: 20 MMOL/L — LOW (ref 22–31)
CREAT SERPL-MCNC: 1.04 MG/DL — SIGNIFICANT CHANGE UP (ref 0.5–1.3)
GLUCOSE SERPL-MCNC: 120 MG/DL — HIGH (ref 70–99)
HCT VFR BLD CALC: 43.4 % — SIGNIFICANT CHANGE UP (ref 39–50)
HGB BLD-MCNC: 14 G/DL — SIGNIFICANT CHANGE UP (ref 13–17)
MCHC RBC-ENTMCNC: 26.2 PG — LOW (ref 27–34)
MCHC RBC-ENTMCNC: 32.3 GM/DL — SIGNIFICANT CHANGE UP (ref 32–36)
MCV RBC AUTO: 81.3 FL — SIGNIFICANT CHANGE UP (ref 80–100)
NRBC # BLD: 0 /100 WBCS — SIGNIFICANT CHANGE UP (ref 0–0)
PLATELET # BLD AUTO: 198 K/UL — SIGNIFICANT CHANGE UP (ref 150–400)
POTASSIUM SERPL-MCNC: 3.6 MMOL/L — SIGNIFICANT CHANGE UP (ref 3.5–5.3)
POTASSIUM SERPL-SCNC: 3.6 MMOL/L — SIGNIFICANT CHANGE UP (ref 3.5–5.3)
RBC # BLD: 5.34 M/UL — SIGNIFICANT CHANGE UP (ref 4.2–5.8)
RBC # FLD: 13.6 % — SIGNIFICANT CHANGE UP (ref 10.3–14.5)
RH IG SCN BLD-IMP: NEGATIVE — SIGNIFICANT CHANGE UP
SODIUM SERPL-SCNC: 138 MMOL/L — SIGNIFICANT CHANGE UP (ref 135–145)
WBC # BLD: 9.47 K/UL — SIGNIFICANT CHANGE UP (ref 3.8–10.5)
WBC # FLD AUTO: 9.47 K/UL — SIGNIFICANT CHANGE UP (ref 3.8–10.5)

## 2020-08-25 PROCEDURE — 52649 PROSTATE LASER ENUCLEATION: CPT

## 2020-08-25 PROCEDURE — 88305 TISSUE EXAM BY PATHOLOGIST: CPT | Mod: 26

## 2020-08-25 RX ORDER — HYDROCHLOROTHIAZIDE 25 MG
12.5 TABLET ORAL DAILY
Refills: 0 | Status: DISCONTINUED | OUTPATIENT
Start: 2020-08-25 | End: 2020-09-09

## 2020-08-25 RX ORDER — HYDROMORPHONE HYDROCHLORIDE 2 MG/ML
0.3 INJECTION INTRAMUSCULAR; INTRAVENOUS; SUBCUTANEOUS
Refills: 0 | Status: DISCONTINUED | OUTPATIENT
Start: 2020-08-25 | End: 2020-08-25

## 2020-08-25 RX ORDER — HYDROMORPHONE HYDROCHLORIDE 2 MG/ML
0.5 INJECTION INTRAMUSCULAR; INTRAVENOUS; SUBCUTANEOUS
Refills: 0 | Status: DISCONTINUED | OUTPATIENT
Start: 2020-08-25 | End: 2020-08-25

## 2020-08-25 RX ORDER — ATORVASTATIN CALCIUM 80 MG/1
40 TABLET, FILM COATED ORAL AT BEDTIME
Refills: 0 | Status: DISCONTINUED | OUTPATIENT
Start: 2020-08-25 | End: 2020-09-09

## 2020-08-25 RX ORDER — ONDANSETRON 8 MG/1
4 TABLET, FILM COATED ORAL ONCE
Refills: 0 | Status: DISCONTINUED | OUTPATIENT
Start: 2020-08-25 | End: 2020-08-25

## 2020-08-25 RX ORDER — ACETAMINOPHEN 500 MG
650 TABLET ORAL EVERY 6 HOURS
Refills: 0 | Status: DISCONTINUED | OUTPATIENT
Start: 2020-08-25 | End: 2020-09-09

## 2020-08-25 RX ORDER — TAMSULOSIN HYDROCHLORIDE 0.4 MG/1
0.4 CAPSULE ORAL AT BEDTIME
Refills: 0 | Status: DISCONTINUED | OUTPATIENT
Start: 2020-08-25 | End: 2020-09-09

## 2020-08-25 RX ORDER — SODIUM CHLORIDE 9 MG/ML
3 INJECTION INTRAMUSCULAR; INTRAVENOUS; SUBCUTANEOUS EVERY 8 HOURS
Refills: 0 | Status: DISCONTINUED | OUTPATIENT
Start: 2020-08-25 | End: 2020-08-25

## 2020-08-25 RX ORDER — LIDOCAINE HCL 20 MG/ML
0.2 VIAL (ML) INJECTION ONCE
Refills: 0 | Status: DISCONTINUED | OUTPATIENT
Start: 2020-08-25 | End: 2020-08-25

## 2020-08-25 RX ORDER — CEFAZOLIN SODIUM 1 G
2000 VIAL (EA) INJECTION EVERY 8 HOURS
Refills: 0 | Status: DISCONTINUED | OUTPATIENT
Start: 2020-08-25 | End: 2020-09-09

## 2020-08-25 RX ORDER — SODIUM CHLORIDE 9 MG/ML
1000 INJECTION, SOLUTION INTRAVENOUS
Refills: 0 | Status: DISCONTINUED | OUTPATIENT
Start: 2020-08-25 | End: 2020-09-09

## 2020-08-25 RX ORDER — POLYETHYLENE GLYCOL 3350 17 G/17G
17 POWDER, FOR SOLUTION ORAL DAILY
Refills: 0 | Status: DISCONTINUED | OUTPATIENT
Start: 2020-08-25 | End: 2020-09-09

## 2020-08-25 RX ORDER — HEPARIN SODIUM 5000 [USP'U]/ML
5000 INJECTION INTRAVENOUS; SUBCUTANEOUS EVERY 8 HOURS
Refills: 0 | Status: DISCONTINUED | OUTPATIENT
Start: 2020-08-25 | End: 2020-09-09

## 2020-08-25 RX ORDER — FUROSEMIDE 40 MG
10 TABLET ORAL ONCE
Refills: 0 | Status: COMPLETED | OUTPATIENT
Start: 2020-08-25 | End: 2020-08-25

## 2020-08-25 RX ORDER — CEFAZOLIN SODIUM 1 G
2000 VIAL (EA) INJECTION ONCE
Refills: 0 | Status: DISCONTINUED | OUTPATIENT
Start: 2020-08-25 | End: 2020-08-25

## 2020-08-25 RX ORDER — SENNA PLUS 8.6 MG/1
2 TABLET ORAL AT BEDTIME
Refills: 0 | Status: DISCONTINUED | OUTPATIENT
Start: 2020-08-25 | End: 2020-09-09

## 2020-08-25 RX ORDER — LISINOPRIL 2.5 MG/1
20 TABLET ORAL DAILY
Refills: 0 | Status: DISCONTINUED | OUTPATIENT
Start: 2020-08-25 | End: 2020-09-09

## 2020-08-25 RX ORDER — FINASTERIDE 5 MG/1
5 TABLET, FILM COATED ORAL DAILY
Refills: 0 | Status: DISCONTINUED | OUTPATIENT
Start: 2020-08-25 | End: 2020-09-09

## 2020-08-25 RX ORDER — METOCLOPRAMIDE HCL 10 MG
10 TABLET ORAL ONCE
Refills: 0 | Status: DISCONTINUED | OUTPATIENT
Start: 2020-08-25 | End: 2020-08-25

## 2020-08-25 RX ORDER — FUROSEMIDE 40 MG
10 TABLET ORAL ONCE
Refills: 0 | Status: COMPLETED | OUTPATIENT
Start: 2020-08-26 | End: 2020-08-26

## 2020-08-25 RX ADMIN — SODIUM CHLORIDE 125 MILLILITER(S): 9 INJECTION, SOLUTION INTRAVENOUS at 11:30

## 2020-08-25 RX ADMIN — TAMSULOSIN HYDROCHLORIDE 0.4 MILLIGRAM(S): 0.4 CAPSULE ORAL at 21:08

## 2020-08-25 RX ADMIN — HEPARIN SODIUM 5000 UNIT(S): 5000 INJECTION INTRAVENOUS; SUBCUTANEOUS at 18:02

## 2020-08-25 RX ADMIN — Medication 100 MILLIGRAM(S): at 18:03

## 2020-08-25 RX ADMIN — SENNA PLUS 2 TABLET(S): 8.6 TABLET ORAL at 21:08

## 2020-08-25 RX ADMIN — POLYETHYLENE GLYCOL 3350 17 GRAM(S): 17 POWDER, FOR SOLUTION ORAL at 18:03

## 2020-08-25 RX ADMIN — FINASTERIDE 5 MILLIGRAM(S): 5 TABLET, FILM COATED ORAL at 12:58

## 2020-08-25 RX ADMIN — ATORVASTATIN CALCIUM 40 MILLIGRAM(S): 80 TABLET, FILM COATED ORAL at 21:08

## 2020-08-25 RX ADMIN — Medication 10 MILLIGRAM(S): at 11:30

## 2020-08-25 NOTE — PROGRESS NOTE ADULT - ASSESSMENT
80 year old male s/p thullium laser enucleation of the prostate    -continue CBI and clamp in the AM  -AM labs, AM lasix, AM TOV  -analgesia as needed  -OOB/DVT prophylaxis  -incentive spirometry

## 2020-08-25 NOTE — PROGRESS NOTE ADULT - SUBJECTIVE AND OBJECTIVE BOX
The patient was seen and examined at bedside.  Denies complaints of chest pain, shortness of breath, nausea, acute pain.    T(C): 36.2 (08-25-20 @ 13:30), Max: 36.6 (08-25-20 @ 07:28)  HR: 61 (08-25-20 @ 13:30) (58 - 77)  BP: 117/73 (08-25-20 @ 13:30) (92/59 - 120/83)  RR: 18 (08-25-20 @ 13:30) (15 - 18)  SpO2: 97% (08-25-20 @ 13:30) (95% - 98%)  Wt(kg): --    Physical Exam:    General: NAD, A+Ox3  Abdomen: soft, non-tender, non-distended      08-25 @ 07:01  -  08-25 @ 16:19  --------------------------------------------------------  IN: 500 mL / OUT: 0 mL / NET: 500 mL      CBI - blood tinged                 14.0   9.47  )-----------( 198      ( 25 Aug 2020 11:41 )             43.4       138  |  104  |  20  ----------------------------<  120<H>  3.6   |  20<L>  |  1.04    Ca    8.9      25 Aug 2020 11:41

## 2020-08-26 ENCOUNTER — TRANSCRIPTION ENCOUNTER (OUTPATIENT)
Age: 80
End: 2020-08-26

## 2020-08-26 VITALS
TEMPERATURE: 97 F | OXYGEN SATURATION: 95 % | SYSTOLIC BLOOD PRESSURE: 109 MMHG | DIASTOLIC BLOOD PRESSURE: 67 MMHG | RESPIRATION RATE: 18 BRPM | HEART RATE: 66 BPM

## 2020-08-26 LAB
ANION GAP SERPL CALC-SCNC: 13 MMOL/L — SIGNIFICANT CHANGE UP (ref 5–17)
BUN SERPL-MCNC: 16 MG/DL — SIGNIFICANT CHANGE UP (ref 7–23)
CALCIUM SERPL-MCNC: 8.7 MG/DL — SIGNIFICANT CHANGE UP (ref 8.4–10.5)
CHLORIDE SERPL-SCNC: 103 MMOL/L — SIGNIFICANT CHANGE UP (ref 96–108)
CO2 SERPL-SCNC: 23 MMOL/L — SIGNIFICANT CHANGE UP (ref 22–31)
CREAT SERPL-MCNC: 0.99 MG/DL — SIGNIFICANT CHANGE UP (ref 0.5–1.3)
GLUCOSE SERPL-MCNC: 107 MG/DL — HIGH (ref 70–99)
HCT VFR BLD CALC: 34.6 % — LOW (ref 39–50)
HCT VFR BLD CALC: 37.9 % — LOW (ref 39–50)
HGB BLD-MCNC: 10.9 G/DL — LOW (ref 13–17)
HGB BLD-MCNC: 12 G/DL — LOW (ref 13–17)
MCHC RBC-ENTMCNC: 26 PG — LOW (ref 27–34)
MCHC RBC-ENTMCNC: 26.3 PG — LOW (ref 27–34)
MCHC RBC-ENTMCNC: 31.5 GM/DL — LOW (ref 32–36)
MCHC RBC-ENTMCNC: 31.7 GM/DL — LOW (ref 32–36)
MCV RBC AUTO: 82.2 FL — SIGNIFICANT CHANGE UP (ref 80–100)
MCV RBC AUTO: 83.6 FL — SIGNIFICANT CHANGE UP (ref 80–100)
NRBC # BLD: 0 /100 WBCS — SIGNIFICANT CHANGE UP (ref 0–0)
NRBC # BLD: 0 /100 WBCS — SIGNIFICANT CHANGE UP (ref 0–0)
PLATELET # BLD AUTO: 194 K/UL — SIGNIFICANT CHANGE UP (ref 150–400)
PLATELET # BLD AUTO: 225 K/UL — SIGNIFICANT CHANGE UP (ref 150–400)
POTASSIUM SERPL-MCNC: 4.6 MMOL/L — SIGNIFICANT CHANGE UP (ref 3.5–5.3)
POTASSIUM SERPL-SCNC: 4.6 MMOL/L — SIGNIFICANT CHANGE UP (ref 3.5–5.3)
RBC # BLD: 4.14 M/UL — LOW (ref 4.2–5.8)
RBC # BLD: 4.61 M/UL — SIGNIFICANT CHANGE UP (ref 4.2–5.8)
RBC # FLD: 13.5 % — SIGNIFICANT CHANGE UP (ref 10.3–14.5)
RBC # FLD: 13.7 % — SIGNIFICANT CHANGE UP (ref 10.3–14.5)
SODIUM SERPL-SCNC: 139 MMOL/L — SIGNIFICANT CHANGE UP (ref 135–145)
SURGICAL PATHOLOGY STUDY: SIGNIFICANT CHANGE UP
WBC # BLD: 10.9 K/UL — HIGH (ref 3.8–10.5)
WBC # BLD: 13.73 K/UL — HIGH (ref 3.8–10.5)
WBC # FLD AUTO: 10.9 K/UL — HIGH (ref 3.8–10.5)
WBC # FLD AUTO: 13.73 K/UL — HIGH (ref 3.8–10.5)

## 2020-08-26 PROCEDURE — 80048 BASIC METABOLIC PNL TOTAL CA: CPT

## 2020-08-26 PROCEDURE — 86901 BLOOD TYPING SEROLOGIC RH(D): CPT

## 2020-08-26 PROCEDURE — C1782: CPT

## 2020-08-26 PROCEDURE — 85027 COMPLETE CBC AUTOMATED: CPT

## 2020-08-26 PROCEDURE — C9399: CPT

## 2020-08-26 PROCEDURE — 86900 BLOOD TYPING SEROLOGIC ABO: CPT

## 2020-08-26 PROCEDURE — C1889: CPT

## 2020-08-26 PROCEDURE — 52649 PROSTATE LASER ENUCLEATION: CPT

## 2020-08-26 PROCEDURE — 88305 TISSUE EXAM BY PATHOLOGIST: CPT

## 2020-08-26 RX ORDER — AMOXICILLIN 250 MG/5ML
0 SUSPENSION, RECONSTITUTED, ORAL (ML) ORAL
Qty: 0 | Refills: 0 | DISCHARGE

## 2020-08-26 RX ORDER — CEPHALEXIN 500 MG
1 CAPSULE ORAL
Qty: 9 | Refills: 0
Start: 2020-08-26 | End: 2020-08-28

## 2020-08-26 RX ORDER — ACETAMINOPHEN 500 MG
2 TABLET ORAL
Qty: 0 | Refills: 0 | DISCHARGE
Start: 2020-08-26

## 2020-08-26 RX ADMIN — Medication 100 MILLIGRAM(S): at 00:21

## 2020-08-26 RX ADMIN — LISINOPRIL 20 MILLIGRAM(S): 2.5 TABLET ORAL at 05:17

## 2020-08-26 RX ADMIN — HEPARIN SODIUM 5000 UNIT(S): 5000 INJECTION INTRAVENOUS; SUBCUTANEOUS at 09:42

## 2020-08-26 RX ADMIN — HEPARIN SODIUM 5000 UNIT(S): 5000 INJECTION INTRAVENOUS; SUBCUTANEOUS at 00:21

## 2020-08-26 RX ADMIN — Medication 10 MILLIGRAM(S): at 05:17

## 2020-08-26 RX ADMIN — Medication 12.5 MILLIGRAM(S): at 05:17

## 2020-08-26 RX ADMIN — Medication 100 MILLIGRAM(S): at 09:41

## 2020-08-26 NOTE — DISCHARGE NOTE NURSING/CASE MANAGEMENT/SOCIAL WORK - PATIENT PORTAL LINK FT
You can access the FollowMyHealth Patient Portal offered by [facility] by registering at the following website: [email or web address]. By joining [employer]’s FollowMyHealth portal, you will also be able to view your health information using other applications (apps) compatible with our system.

## 2020-08-26 NOTE — PROGRESS NOTE ADULT - SUBJECTIVE AND OBJECTIVE BOX
UROLOGY DAILY PROGRESS NOTE:     Subjective: Patient seen and examined at bedside. No overnight events.       Objective:  Vital signs  T(F): , Max: 98.4 (08-26-20 @ 01:00)  HR: 71 (08-26-20 @ 01:00)  BP: 127/72 (08-26-20 @ 05:00)  SpO2: 96% (08-26-20 @ 05:00)  Wt(kg): --    I&O's Summary    25 Aug 2020 07:01  -  26 Aug 2020 07:00  --------------------------------------------------------  IN: 3325 mL / OUT: 0 mL / NET: 3325 mL        Gen: NAD  Pulm: No respiratory distress, no subcostal retractions  CV: RRR, no JVD  Abd: Soft, NT, ND  : CBI off- urine light pink to clear     Labs:  08-26  x     / x     /0.99   08-25  9.47  / 43.4  /1.04                           14.0   9.47  )-----------( 198      ( 25 Aug 2020 11:41 )             43.4     08-26    139  |  103  |  16  ----------------------------<  107<H>  4.6   |  23  |  0.99    Ca    8.7      26 Aug 2020 06:23          Urine Cx:

## 2020-08-28 LAB — IODINE, SERUM: 54.1 UG/L — SIGNIFICANT CHANGE UP (ref 40–92)

## 2020-09-11 ENCOUNTER — APPOINTMENT (OUTPATIENT)
Dept: UROLOGY | Facility: CLINIC | Age: 80
End: 2020-09-11
Payer: MEDICARE

## 2020-09-11 VITALS — TEMPERATURE: 98 F

## 2020-09-11 PROCEDURE — 99024 POSTOP FOLLOW-UP VISIT: CPT

## 2020-09-11 NOTE — HISTORY OF PRESENT ILLNESS
[Urinary Urgency] : urinary urgency [Nocturia] : nocturia [None] : None [FreeTextEntry1] : 80 yr old male presents to follow up s/p Holep on 8/25. Pt states some intermittent blood tinged urine. Pt denies dysuria, abd/ flank pain. \par \par Pathology - Benign prostatic hyperplasia 79 grams [Weak Stream] : no weak stream [Dysuria] : no dysuria

## 2020-09-11 NOTE — PHYSICAL EXAM
[General Appearance - Well Developed] : well developed [Heart Rate And Rhythm] : Heart rate and rhythm were normal [Skin Color & Pigmentation] : normal skin color and pigmentation [Abdomen Soft] : soft [Normal Station and Gait] : the gait and station were normal for the patient's age [Oriented To Time, Place, And Person] : oriented to person, place, and time [] : no respiratory distress [No Focal Deficits] : no focal deficits

## 2020-12-11 ENCOUNTER — APPOINTMENT (OUTPATIENT)
Dept: UROLOGY | Facility: CLINIC | Age: 80
End: 2020-12-11
Payer: MEDICARE

## 2020-12-11 VITALS
RESPIRATION RATE: 14 BRPM | BODY MASS INDEX: 23.46 KG/M2 | SYSTOLIC BLOOD PRESSURE: 135 MMHG | WEIGHT: 177 LBS | DIASTOLIC BLOOD PRESSURE: 88 MMHG | OXYGEN SATURATION: 96 % | TEMPERATURE: 98 F | HEIGHT: 73 IN | HEART RATE: 64 BPM

## 2020-12-11 PROCEDURE — 99072 ADDL SUPL MATRL&STAF TM PHE: CPT

## 2020-12-11 PROCEDURE — 99213 OFFICE O/P EST LOW 20 MIN: CPT

## 2020-12-11 RX ORDER — AMOXICILLIN AND CLAVULANATE POTASSIUM 500; 125 MG/1; MG/1
500-125 TABLET, FILM COATED ORAL
Qty: 20 | Refills: 0 | Status: COMPLETED | COMMUNITY
Start: 2017-03-17 | End: 2020-12-11

## 2020-12-11 RX ORDER — TAMSULOSIN HYDROCHLORIDE 0.4 MG/1
0.4 CAPSULE ORAL
Qty: 90 | Refills: 3 | Status: COMPLETED | COMMUNITY
Start: 2020-01-23 | End: 2020-12-11

## 2020-12-11 RX ORDER — DUTASTERIDE 0.5 MG/1
0.5 CAPSULE, LIQUID FILLED ORAL
Qty: 90 | Refills: 3 | Status: COMPLETED | COMMUNITY
Start: 2020-01-23 | End: 2020-12-11

## 2020-12-11 NOTE — HISTORY OF PRESENT ILLNESS
[None] : None [FreeTextEntry1] : 80 yr old male presents with BPH.  Pt denies dysuria, abd/ flank pain. Pt complains of dribbling, changing liners x 3 a day. \par \par Holep on 8/25\par Pathology - Benign prostatic hyperplasia 79 grams \par \par Peak flow 47.5cc/sec, voided 288cc\par PVR = 0cc\par  [Dysuria] : no dysuria [Hematuria - Gross] : no gross hematuria

## 2020-12-11 NOTE — PHYSICAL EXAM
[General Appearance - Well Developed] : well developed [Bowel Sounds] : normal bowel sounds [Skin Color & Pigmentation] : normal skin color and pigmentation [Heart Rate And Rhythm] : Heart rate and rhythm were normal [] : no respiratory distress [Oriented To Time, Place, And Person] : oriented to person, place, and time [Normal Station and Gait] : the gait and station were normal for the patient's age [No Focal Deficits] : no focal deficits

## 2020-12-14 ENCOUNTER — TRANSCRIPTION ENCOUNTER (OUTPATIENT)
Age: 80
End: 2020-12-14

## 2020-12-15 PROBLEM — Z87.09 HISTORY OF ACUTE PHARYNGITIS: Status: RESOLVED | Noted: 2017-03-17 | Resolved: 2020-12-15

## 2020-12-15 LAB — PSA SERPL-MCNC: 0.17 NG/ML

## 2020-12-29 ENCOUNTER — RX RENEWAL (OUTPATIENT)
Age: 80
End: 2020-12-29

## 2021-01-05 ENCOUNTER — APPOINTMENT (OUTPATIENT)
Dept: FAMILY MEDICINE | Facility: CLINIC | Age: 81
End: 2021-01-05
Payer: MEDICARE

## 2021-01-05 VITALS
WEIGHT: 184 LBS | HEIGHT: 73 IN | BODY MASS INDEX: 24.39 KG/M2 | RESPIRATION RATE: 20 BRPM | SYSTOLIC BLOOD PRESSURE: 126 MMHG | HEART RATE: 76 BPM | DIASTOLIC BLOOD PRESSURE: 70 MMHG

## 2021-01-05 DIAGNOSIS — Z01.818 ENCOUNTER FOR OTHER PREPROCEDURAL EXAMINATION: ICD-10-CM

## 2021-01-05 DIAGNOSIS — H26.9 UNSPECIFIED CATARACT: ICD-10-CM

## 2021-01-05 DIAGNOSIS — Z23 ENCOUNTER FOR IMMUNIZATION: ICD-10-CM

## 2021-01-05 PROCEDURE — G0008: CPT

## 2021-01-05 PROCEDURE — 99214 OFFICE O/P EST MOD 30 MIN: CPT | Mod: 25

## 2021-01-05 PROCEDURE — 90686 IIV4 VACC NO PRSV 0.5 ML IM: CPT

## 2021-01-05 PROCEDURE — 99072 ADDL SUPL MATRL&STAF TM PHE: CPT

## 2021-01-05 NOTE — ASSESSMENT
[Patient Optimized for Surgery] : Patient optimized for surgery [No Further Testing Recommended] : no further testing recommended [FreeTextEntry4] : Clinically stable as of this encounter

## 2021-01-05 NOTE — PLAN
[FreeTextEntry1] : Medically clear for procedure with acceptable risk\par Note - flu vaccine given at this encounter

## 2021-01-05 NOTE — HISTORY OF PRESENT ILLNESS
[No Pertinent Cardiac History] : no history of aortic stenosis, atrial fibrillation, coronary artery disease, recent myocardial infarction, or implantable device/pacemaker [No Pertinent Pulmonary History] : no history of asthma, COPD, sleep apnea, or smoking [No Adverse Anesthesia Reaction] : no adverse anesthesia reaction in self or family member [Chronic Anticoagulation] : no chronic anticoagulation [Chronic Kidney Disease] : no chronic kidney disease [Diabetes] : no diabetes [(Patient denies any chest pain, claudication, dyspnea on exertion, orthopnea, palpitations or syncope)] : Patient denies any chest pain, claudication, dyspnea on exertion, orthopnea, palpitations or syncope [FreeTextEntry1] : L cataract repair [FreeTextEntry2] : 1/13/21 [FreeTextEntry3] : Dr. Short [FreeTextEntry4] : Presents for presurgical exam.  Denies recent illness, cough, temp elevation, urinary symptoms.  [FreeTextEntry5] : Patient has well controlled hypertension

## 2021-01-12 ENCOUNTER — TRANSCRIPTION ENCOUNTER (OUTPATIENT)
Age: 81
End: 2021-01-12

## 2021-01-12 NOTE — ASU PATIENT PROFILE, ADULT - PSH
H/O colonoscopy    Status post [facility] surgery  chest - 1982   Enlarged prostate  surgery done in oct 2020  H/O colonoscopy    Status post Mohs surgery  chest - 1982

## 2021-01-12 NOTE — ASU PATIENT PROFILE, ADULT - PMH
Enlarged prostate with lower urinary tract symptoms (LUTS)    Yin catheter status    HLD (hyperlipidemia)    HTN (hypertension)    Melanoma    Urinary retention    Urinary retention

## 2021-01-13 ENCOUNTER — OUTPATIENT (OUTPATIENT)
Dept: OUTPATIENT SERVICES | Facility: HOSPITAL | Age: 81
LOS: 1 days | End: 2021-01-13
Payer: MEDICARE

## 2021-01-13 VITALS
OXYGEN SATURATION: 98 % | RESPIRATION RATE: 16 BRPM | DIASTOLIC BLOOD PRESSURE: 70 MMHG | SYSTOLIC BLOOD PRESSURE: 126 MMHG | TEMPERATURE: 97 F | HEART RATE: 76 BPM

## 2021-01-13 VITALS
HEART RATE: 72 BPM | SYSTOLIC BLOOD PRESSURE: 136 MMHG | RESPIRATION RATE: 14 BRPM | HEIGHT: 73 IN | TEMPERATURE: 97 F | DIASTOLIC BLOOD PRESSURE: 76 MMHG | WEIGHT: 177.03 LBS | OXYGEN SATURATION: 97 %

## 2021-01-13 DIAGNOSIS — Z98.890 OTHER SPECIFIED POSTPROCEDURAL STATES: Chronic | ICD-10-CM

## 2021-01-13 DIAGNOSIS — H25.12 AGE-RELATED NUCLEAR CATARACT, LEFT EYE: ICD-10-CM

## 2021-01-13 DIAGNOSIS — N40.0 BENIGN PROSTATIC HYPERPLASIA WITHOUT LOWER URINARY TRACT SYMPTOMS: Chronic | ICD-10-CM

## 2021-01-13 PROCEDURE — 66984 XCAPSL CTRC RMVL W/O ECP: CPT | Mod: LT

## 2021-01-13 PROCEDURE — V2632: CPT

## 2021-01-13 RX ORDER — SODIUM CHLORIDE 9 MG/ML
1000 INJECTION, SOLUTION INTRAVENOUS
Refills: 0 | Status: DISCONTINUED | OUTPATIENT
Start: 2021-01-13 | End: 2021-01-13

## 2021-01-13 RX ORDER — TAMSULOSIN HYDROCHLORIDE 0.4 MG/1
1 CAPSULE ORAL
Qty: 0 | Refills: 0 | DISCHARGE

## 2021-01-13 RX ORDER — TROPICAMIDE 1 %
1 DROPS OPHTHALMIC (EYE)
Refills: 0 | Status: COMPLETED | OUTPATIENT
Start: 2021-01-13 | End: 2021-01-13

## 2021-01-13 RX ORDER — PHENYLEPHRINE HCL 2.5 %
1 DROPS OPHTHALMIC (EYE)
Refills: 0 | Status: COMPLETED | OUTPATIENT
Start: 2021-01-13 | End: 2021-01-13

## 2021-01-13 RX ORDER — DUTASTERIDE 0.5 MG/1
1 CAPSULE, LIQUID FILLED ORAL
Qty: 0 | Refills: 0 | DISCHARGE

## 2021-01-13 RX ORDER — KETOROLAC TROMETHAMINE 0.5 %
1 DROPS OPHTHALMIC (EYE)
Refills: 0 | Status: COMPLETED | OUTPATIENT
Start: 2021-01-13 | End: 2021-01-13

## 2021-01-13 RX ORDER — CYCLOPENTOLATE HYDROCHLORIDE 10 MG/ML
1 SOLUTION/ DROPS OPHTHALMIC
Refills: 0 | Status: COMPLETED | OUTPATIENT
Start: 2021-01-13 | End: 2021-01-13

## 2021-01-13 RX ORDER — ACETAMINOPHEN 500 MG
650 TABLET ORAL ONCE
Refills: 0 | Status: DISCONTINUED | OUTPATIENT
Start: 2021-01-13 | End: 2021-01-27

## 2021-01-13 RX ADMIN — Medication 1 DROP(S): at 09:24

## 2021-01-13 RX ADMIN — CYCLOPENTOLATE HYDROCHLORIDE 1 DROP(S): 10 SOLUTION/ DROPS OPHTHALMIC at 09:19

## 2021-01-13 RX ADMIN — Medication 1 DROP(S): at 09:30

## 2021-01-13 RX ADMIN — Medication 1 DROP(S): at 09:18

## 2021-01-13 RX ADMIN — Medication 1 DROP(S): at 09:19

## 2021-01-13 RX ADMIN — CYCLOPENTOLATE HYDROCHLORIDE 1 DROP(S): 10 SOLUTION/ DROPS OPHTHALMIC at 09:30

## 2021-01-13 RX ADMIN — Medication 1 DROP(S): at 09:31

## 2021-01-13 RX ADMIN — Medication 1 DROP(S): at 09:23

## 2021-01-13 RX ADMIN — CYCLOPENTOLATE HYDROCHLORIDE 1 DROP(S): 10 SOLUTION/ DROPS OPHTHALMIC at 09:24

## 2021-01-13 NOTE — ASU DISCHARGE PLAN (ADULT/PEDIATRIC) - ASU DC SPECIAL INSTRUCTIONSFT
Keep shield on eye until office visit today at 215 pm  Any problems call office at [phone]    OFFICE VISIT TODAY  pm  Bring Drops

## 2021-01-13 NOTE — BRIEF OPERATIVE NOTE - NSICDXBRIEFPROCEDURE_GEN_ALL_CORE_FT
PROCEDURES:  Single stage extracapsular removal of cataract with insertion of intraocular lens prosthesis by phacoemulsification 13-Jan-2021 11:16:53  Christiano Short

## 2021-06-01 ENCOUNTER — APPOINTMENT (OUTPATIENT)
Dept: FAMILY MEDICINE | Facility: CLINIC | Age: 81
End: 2021-06-01
Payer: MEDICARE

## 2021-06-01 VITALS
SYSTOLIC BLOOD PRESSURE: 124 MMHG | HEART RATE: 60 BPM | WEIGHT: 178 LBS | HEIGHT: 73 IN | RESPIRATION RATE: 20 BRPM | DIASTOLIC BLOOD PRESSURE: 70 MMHG | BODY MASS INDEX: 23.59 KG/M2

## 2021-06-01 PROCEDURE — 36415 COLL VENOUS BLD VENIPUNCTURE: CPT

## 2021-06-01 PROCEDURE — 93000 ELECTROCARDIOGRAM COMPLETE: CPT

## 2021-06-01 PROCEDURE — 99072 ADDL SUPL MATRL&STAF TM PHE: CPT

## 2021-06-01 PROCEDURE — G0439: CPT

## 2021-06-01 NOTE — ASSESSMENT
[FreeTextEntry1] : Comprehensive exam reveals patient to be hemodynamically stable with acceptable BP\par Reviewed EKG and compared to prior tracings - note bradycardia and prominent 1st degree AV block now noted - feel prudent to recommend Cardiology evaluation - request for referral placed and staff to assist\par Lab profiles drawn in office and sent

## 2021-06-01 NOTE — COUNSELING
[de-identified] : Healthy eating and activities [None] : None [Good understanding] : Patient has a good understanding of lifestyle changes and steps needed to achieve self management goal

## 2021-06-01 NOTE — HISTORY OF PRESENT ILLNESS
[FreeTextEntry1] : Requests comprehensive exam [de-identified] : Presents for comprehensive exam.  States feeling generally well.  Trying to watch diet - states "I lost that extra weight that I gained."  Reviewed screening and immunizations - note is fully vaccinated against COVID-19.  Following with Urology.

## 2021-06-01 NOTE — PHYSICAL EXAM
[Normal Posterior Cervical Nodes] : no posterior cervical lymphadenopathy [Normal Anterior Cervical Nodes] : no anterior cervical lymphadenopathy [Normal Inguinal Nodes] : no inguinal lymphadenopathy [Normal Femoral Nodes] : no femoral lymphadenopathy [Normal] : no rash [Coordination Grossly Intact] : coordination grossly intact [No Focal Deficits] : no focal deficits [Normal Gait] : normal gait [Speech Grossly Normal] : speech grossly normal [Memory Grossly Normal] : memory grossly normal [Normal Affect] : the affect was normal [Alert and Oriented x3] : oriented to person, place, and time [Normal Mood] : the mood was normal [Normal Insight/Judgement] : insight and judgment were intact [FreeTextEntry1] : defer to Urology [de-identified] : defer to Urology

## 2021-06-01 NOTE — HEALTH RISK ASSESSMENT
[Yes] : Yes [2 - 3 times a week (3 pts)] : 2 - 3  times a week (3 points) [1 or 2 (0 pts)] : 1 or 2 (0 points) [Never (0 pts)] : Never (0 points) [No] : In the past 12 months have you used drugs other than those required for medical reasons? No [No falls in past year] : Patient reported no falls in the past year [0] : 2) Feeling down, depressed, or hopeless: Not at all (0) [Fully functional (bathing, dressing, toileting, transferring, walking, feeding)] : Fully functional (bathing, dressing, toileting, transferring, walking, feeding) [Fully functional (using the telephone, shopping, preparing meals, housekeeping, doing laundry, using] : Fully functional and needs no help or supervision to perform IADLs (using the telephone, shopping, preparing meals, housekeeping, doing laundry, using transportation, managing medications and managing finances) [Reviewed no changes] : Reviewed no changes [] : No [Audit-CScore] : 3 [AdvancecareDate] : 06/21

## 2021-06-02 LAB
ALBUMIN SERPL ELPH-MCNC: 4.3 G/DL
ALP BLD-CCNC: 55 U/L
ALT SERPL-CCNC: 20 U/L
ANION GAP SERPL CALC-SCNC: 10 MMOL/L
AST SERPL-CCNC: 32 U/L
BASOPHILS # BLD AUTO: 0.02 K/UL
BASOPHILS NFR BLD AUTO: 0.3 %
BILIRUB SERPL-MCNC: 0.3 MG/DL
BUN SERPL-MCNC: 18 MG/DL
CALCIUM SERPL-MCNC: 9.3 MG/DL
CHLORIDE SERPL-SCNC: 101 MMOL/L
CHOLEST SERPL-MCNC: 172 MG/DL
CO2 SERPL-SCNC: 26 MMOL/L
CREAT SERPL-MCNC: 0.99 MG/DL
EOSINOPHIL # BLD AUTO: 0.15 K/UL
EOSINOPHIL NFR BLD AUTO: 2.2 %
FOLATE SERPL-MCNC: 10.8 NG/ML
GLUCOSE SERPL-MCNC: 90 MG/DL
HCT VFR BLD CALC: 45.1 %
HDLC SERPL-MCNC: 71 MG/DL
HGB BLD-MCNC: 14.7 G/DL
IMM GRANULOCYTES NFR BLD AUTO: 0.4 %
LDLC SERPL CALC-MCNC: 93 MG/DL
LYMPHOCYTES # BLD AUTO: 2.03 K/UL
LYMPHOCYTES NFR BLD AUTO: 30.4 %
MAN DIFF?: NORMAL
MCHC RBC-ENTMCNC: 26.5 PG
MCHC RBC-ENTMCNC: 32.6 GM/DL
MCV RBC AUTO: 81.4 FL
MONOCYTES # BLD AUTO: 0.59 K/UL
MONOCYTES NFR BLD AUTO: 8.8 %
NEUTROPHILS # BLD AUTO: 3.86 K/UL
NEUTROPHILS NFR BLD AUTO: 57.9 %
NONHDLC SERPL-MCNC: 101 MG/DL
PLATELET # BLD AUTO: 276 K/UL
POTASSIUM SERPL-SCNC: 4.1 MMOL/L
PROT SERPL-MCNC: 6.5 G/DL
PSA FREE FLD-MCNC: 29 %
PSA FREE SERPL-MCNC: 0.15 NG/ML
PSA SERPL-MCNC: 0.52 NG/ML
RBC # BLD: 5.54 M/UL
RBC # FLD: 13.4 %
SODIUM SERPL-SCNC: 138 MMOL/L
T4 FREE SERPL-MCNC: 1.4 NG/DL
TRIGL SERPL-MCNC: 40 MG/DL
TSH SERPL-ACNC: 1.27 UIU/ML
VIT B12 SERPL-MCNC: 403 PG/ML
WBC # FLD AUTO: 6.68 K/UL

## 2021-06-15 ENCOUNTER — NON-APPOINTMENT (OUTPATIENT)
Age: 81
End: 2021-06-15

## 2021-06-18 ENCOUNTER — NON-APPOINTMENT (OUTPATIENT)
Age: 81
End: 2021-06-18

## 2021-06-18 ENCOUNTER — APPOINTMENT (OUTPATIENT)
Dept: CARDIOLOGY | Facility: CLINIC | Age: 81
End: 2021-06-18
Payer: MEDICARE

## 2021-06-18 VITALS
HEIGHT: 73 IN | OXYGEN SATURATION: 95 % | HEART RATE: 65 BPM | RESPIRATION RATE: 20 BRPM | WEIGHT: 180 LBS | BODY MASS INDEX: 23.86 KG/M2 | TEMPERATURE: 98.1 F | DIASTOLIC BLOOD PRESSURE: 72 MMHG | SYSTOLIC BLOOD PRESSURE: 135 MMHG

## 2021-06-18 DIAGNOSIS — R07.89 OTHER CHEST PAIN: ICD-10-CM

## 2021-06-18 PROCEDURE — 99203 OFFICE O/P NEW LOW 30 MIN: CPT

## 2021-06-18 PROCEDURE — 99072 ADDL SUPL MATRL&STAF TM PHE: CPT

## 2021-06-18 PROCEDURE — 93000 ELECTROCARDIOGRAM COMPLETE: CPT

## 2021-07-30 ENCOUNTER — APPOINTMENT (OUTPATIENT)
Dept: CARDIOLOGY | Facility: CLINIC | Age: 81
End: 2021-07-30
Payer: MEDICARE

## 2021-07-30 PROCEDURE — 93306 TTE W/DOPPLER COMPLETE: CPT

## 2021-07-30 PROCEDURE — 93015 CV STRESS TEST SUPVJ I&R: CPT

## 2021-12-01 ENCOUNTER — RX RENEWAL (OUTPATIENT)
Age: 81
End: 2021-12-01

## 2021-12-10 ENCOUNTER — APPOINTMENT (OUTPATIENT)
Dept: UROLOGY | Facility: CLINIC | Age: 81
End: 2021-12-10
Payer: MEDICARE

## 2021-12-10 VITALS
OXYGEN SATURATION: 96 % | WEIGHT: 180 LBS | BODY MASS INDEX: 23.86 KG/M2 | HEART RATE: 70 BPM | RESPIRATION RATE: 17 BRPM | HEIGHT: 73 IN | TEMPERATURE: 98.2 F | SYSTOLIC BLOOD PRESSURE: 121 MMHG | DIASTOLIC BLOOD PRESSURE: 79 MMHG

## 2021-12-10 PROCEDURE — 99214 OFFICE O/P EST MOD 30 MIN: CPT

## 2021-12-10 NOTE — ASSESSMENT
[FreeTextEntry1] : Trial of Cialis 20 mg for ED\par Cautions advised\par \par 12-month follow-up\par No further PSA screening due to his age and very low PSA last 2 checks

## 2021-12-10 NOTE — HISTORY OF PRESENT ILLNESS
[None] : None [FreeTextEntry1] : 80 yr old male presents with BPH\par \par HOLEP on 8/25/2020\par Pathology - Benign prostatic hyperplasia 79 grams \par \par Here for follow up\par Voiding with good strong stream\par No incontinence\par \par PVR = 5cc\par \par \par Complains of difficulty achieving erection.\par This was present prior to surgery\par He had taken Viagra in the past which no longer works for him\par \par PSA history\par 0.52 June 2021\par 0.17 December 2020\par 8.6 6 January 2020\par 3.9 6 December 2018\par 3.9 7 December 2017\par 4.1 November 2016 [Dysuria] : no dysuria [Hematuria - Gross] : no gross hematuria

## 2022-03-25 NOTE — ASSESSMENT
Problem: Adult Inpatient Plan of Care  Goal: Plan of Care Review  Flowsheets (Taken 3/25/2022 4242)  Progress: no change  Plan of Care Reviewed With: patient  Outcome Summary: Pt AOx4 but lethargic and forgetful. Fevers treated with tylenol. Last temp 100.1f. Pt very weak, high falls risk. OOB with assist of 2. IV abx given, fluids continued. 1 to 1 sitter continued. No complaints of pain, will ctm      [FreeTextEntry1] : PVR- 43 ml\par \par Pt educated to wait 4-6 for heavy lifting and sexual activity. Blood tinged urine is expected, call if omi red blood and clots. \par \par RTC in 3 months with uroflow, PVR and PSA

## 2022-07-22 ENCOUNTER — APPOINTMENT (OUTPATIENT)
Dept: FAMILY MEDICINE | Facility: CLINIC | Age: 82
End: 2022-07-22

## 2022-07-22 VITALS
SYSTOLIC BLOOD PRESSURE: 122 MMHG | DIASTOLIC BLOOD PRESSURE: 75 MMHG | HEIGHT: 73 IN | BODY MASS INDEX: 23.59 KG/M2 | HEART RATE: 68 BPM | RESPIRATION RATE: 20 BRPM | WEIGHT: 178 LBS

## 2022-07-22 DIAGNOSIS — R73.01 IMPAIRED FASTING GLUCOSE: ICD-10-CM

## 2022-07-22 DIAGNOSIS — Z87.891 PERSONAL HISTORY OF NICOTINE DEPENDENCE: ICD-10-CM

## 2022-07-22 PROCEDURE — 36415 COLL VENOUS BLD VENIPUNCTURE: CPT

## 2022-07-22 PROCEDURE — G0439: CPT

## 2022-07-22 NOTE — COUNSELING
[de-identified] : Healthy eating and activities [None] : None [Good understanding] : Patient has a good understanding of lifestyle changes and steps needed to achieve self management goal

## 2022-07-22 NOTE — HISTORY OF PRESENT ILLNESS
[FreeTextEntry1] : Requests comprehensive exam [de-identified] : Presents for comprehensive exam.  States feeling generally well; trying to eat healthy and remain active.  Did have COVID about a month ago but fully recovered (had very minimal symptoms - is fully vaccinated).  UTD with Urology and has a F/U with Cardiology upcoming next week.  Reviewed screening - pt to schedule updated colonoscopy.  Reviewed immunizations - pt requests Varicella/Zoster with today's labs as he does not remember having Chicken Pox as a child and is contemplating receiving Shingrix.

## 2022-07-22 NOTE — PHYSICAL EXAM
[Normal Posterior Cervical Nodes] : no posterior cervical lymphadenopathy [Normal Anterior Cervical Nodes] : no anterior cervical lymphadenopathy [Normal] : no joint swelling and grossly normal strength and tone [Coordination Grossly Intact] : coordination grossly intact [No Focal Deficits] : no focal deficits [Normal Gait] : normal gait [Speech Grossly Normal] : speech grossly normal [Memory Grossly Normal] : memory grossly normal [Normal Affect] : the affect was normal [Alert and Oriented x3] : oriented to person, place, and time [Normal Mood] : the mood was normal [Normal Insight/Judgement] : insight and judgment were intact [de-identified] : scattered seborrheic keratoses noted

## 2022-07-23 LAB
ALBUMIN SERPL ELPH-MCNC: 4.5 G/DL
ALP BLD-CCNC: 55 U/L
ALT SERPL-CCNC: 21 U/L
ANION GAP SERPL CALC-SCNC: 13 MMOL/L
AST SERPL-CCNC: 33 U/L
BASOPHILS # BLD AUTO: 0.01 K/UL
BASOPHILS NFR BLD AUTO: 0.1 %
BILIRUB SERPL-MCNC: 0.3 MG/DL
BUN SERPL-MCNC: 17 MG/DL
CALCIUM SERPL-MCNC: 9.3 MG/DL
CHLORIDE SERPL-SCNC: 102 MMOL/L
CHOLEST SERPL-MCNC: 196 MG/DL
CO2 SERPL-SCNC: 23 MMOL/L
CREAT SERPL-MCNC: 1.04 MG/DL
EGFR: 73 ML/MIN/1.73M2
EOSINOPHIL # BLD AUTO: 0.21 K/UL
EOSINOPHIL NFR BLD AUTO: 2.6 %
ESTIMATED AVERAGE GLUCOSE: 111 MG/DL
FOLATE SERPL-MCNC: >20 NG/ML
GLUCOSE SERPL-MCNC: 97 MG/DL
HBA1C MFR BLD HPLC: 5.5 %
HCT VFR BLD CALC: 45.7 %
HDLC SERPL-MCNC: 75 MG/DL
HGB BLD-MCNC: 14.8 G/DL
IMM GRANULOCYTES NFR BLD AUTO: 0.4 %
LDLC SERPL CALC-MCNC: 102 MG/DL
LYMPHOCYTES # BLD AUTO: 1.92 K/UL
LYMPHOCYTES NFR BLD AUTO: 23.6 %
MAN DIFF?: NORMAL
MCHC RBC-ENTMCNC: 26.5 PG
MCHC RBC-ENTMCNC: 32.4 GM/DL
MCV RBC AUTO: 81.8 FL
MONOCYTES # BLD AUTO: 0.59 K/UL
MONOCYTES NFR BLD AUTO: 7.2 %
NEUTROPHILS # BLD AUTO: 5.38 K/UL
NEUTROPHILS NFR BLD AUTO: 66.1 %
NONHDLC SERPL-MCNC: 120 MG/DL
PLATELET # BLD AUTO: 247 K/UL
POTASSIUM SERPL-SCNC: 4 MMOL/L
PROT SERPL-MCNC: 7 G/DL
PSA FREE FLD-MCNC: 32 %
PSA FREE SERPL-MCNC: 0.23 NG/ML
PSA SERPL-MCNC: 0.71 NG/ML
RBC # BLD: 5.59 M/UL
RBC # FLD: 14.9 %
SODIUM SERPL-SCNC: 139 MMOL/L
T4 FREE SERPL-MCNC: 1.4 NG/DL
TRIGL SERPL-MCNC: 92 MG/DL
TSH SERPL-ACNC: 1.3 UIU/ML
VIT B12 SERPL-MCNC: 399 PG/ML
VZV AB TITR SER: POSITIVE
VZV IGG SER IF-ACNC: 486.4 INDEX
WBC # FLD AUTO: 8.14 K/UL

## 2022-07-26 ENCOUNTER — APPOINTMENT (OUTPATIENT)
Dept: CARDIOLOGY | Facility: CLINIC | Age: 82
End: 2022-07-26

## 2022-07-26 ENCOUNTER — NON-APPOINTMENT (OUTPATIENT)
Age: 82
End: 2022-07-26

## 2022-07-26 VITALS
WEIGHT: 177 LBS | HEIGHT: 73 IN | BODY MASS INDEX: 23.46 KG/M2 | RESPIRATION RATE: 16 BRPM | OXYGEN SATURATION: 96 % | HEART RATE: 65 BPM | SYSTOLIC BLOOD PRESSURE: 121 MMHG | TEMPERATURE: 97.1 F | DIASTOLIC BLOOD PRESSURE: 78 MMHG

## 2022-07-26 PROCEDURE — 99214 OFFICE O/P EST MOD 30 MIN: CPT | Mod: 25

## 2022-07-26 PROCEDURE — 93000 ELECTROCARDIOGRAM COMPLETE: CPT

## 2022-07-26 RX ORDER — TADALAFIL 20 MG/1
20 TABLET ORAL DAILY
Qty: 30 | Refills: 3 | Status: DISCONTINUED | COMMUNITY
Start: 2021-12-10 | End: 2022-07-26
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
## 2022-10-24 ENCOUNTER — APPOINTMENT (OUTPATIENT)
Dept: UROLOGY | Facility: CLINIC | Age: 82
End: 2022-10-24

## 2022-10-24 PROCEDURE — 99213 OFFICE O/P EST LOW 20 MIN: CPT

## 2022-10-26 ENCOUNTER — RX RENEWAL (OUTPATIENT)
Age: 82
End: 2022-10-26

## 2022-11-04 NOTE — ASSESSMENT
[FreeTextEntry1] : Trial of Cialis 20 mg for ED\par Cautions advised\par \par 12-month follow-up\par No further PSA screening due to his age and very low PSA last 3 checks

## 2022-11-04 NOTE — HISTORY OF PRESENT ILLNESS
[None] : None [FreeTextEntry1] : 81y/o man\par BPH\par \par hx of HOLEP on 8/25/2020\par Pathology - Benign prostatic hyperplasia 79 grams \par \par Here for follow up\par Voiding with good strong stream\par No incontinence\par \par PVR = 0 cc\par \par On tadalafil for ED\par \par PSA history\par 0.71 Jul 2022\par 0.52 June 2021\par 0.17 December 2020\par 8.6 6 January 2020\par 3.9 6 December 2018\par 3.9 7 December 2017\par 4.1 November 2016 [Dysuria] : no dysuria [Hematuria - Gross] : no gross hematuria

## 2023-05-29 ENCOUNTER — INPATIENT (INPATIENT)
Facility: HOSPITAL | Age: 83
LOS: 1 days | Discharge: HOME | DRG: 872 | End: 2023-05-31
Attending: HOSPITALIST | Admitting: INTERNAL MEDICINE
Payer: MEDICARE

## 2023-05-29 VITALS
OXYGEN SATURATION: 97 % | HEART RATE: 87 BPM | SYSTOLIC BLOOD PRESSURE: 110 MMHG | HEIGHT: 74 IN | TEMPERATURE: 100 F | RESPIRATION RATE: 19 BRPM | WEIGHT: 175.93 LBS | DIASTOLIC BLOOD PRESSURE: 58 MMHG

## 2023-05-29 DIAGNOSIS — Z98.890 OTHER SPECIFIED POSTPROCEDURAL STATES: Chronic | ICD-10-CM

## 2023-05-29 DIAGNOSIS — N39.0 URINARY TRACT INFECTION, SITE NOT SPECIFIED: ICD-10-CM

## 2023-05-29 DIAGNOSIS — N40.0 BENIGN PROSTATIC HYPERPLASIA WITHOUT LOWER URINARY TRACT SYMPTOMS: Chronic | ICD-10-CM

## 2023-05-29 LAB
ALBUMIN SERPL ELPH-MCNC: 3.6 G/DL — SIGNIFICANT CHANGE UP (ref 3.3–5)
ALP SERPL-CCNC: 56 U/L — SIGNIFICANT CHANGE UP (ref 40–120)
ALT FLD-CCNC: 39 U/L — SIGNIFICANT CHANGE UP (ref 10–45)
ANION GAP SERPL CALC-SCNC: 8 MMOL/L — SIGNIFICANT CHANGE UP (ref 5–17)
APPEARANCE UR: ABNORMAL
APTT BLD: 25.7 SEC — LOW (ref 27.5–35.5)
AST SERPL-CCNC: 41 U/L — HIGH (ref 10–40)
BACTERIA # UR AUTO: ABNORMAL /HPF
BASOPHILS # BLD AUTO: 0 K/UL — SIGNIFICANT CHANGE UP (ref 0–0.2)
BASOPHILS NFR BLD AUTO: 0 % — SIGNIFICANT CHANGE UP (ref 0–2)
BILIRUB SERPL-MCNC: 0.9 MG/DL — SIGNIFICANT CHANGE UP (ref 0.2–1.2)
BILIRUB UR-MCNC: NEGATIVE — SIGNIFICANT CHANGE UP
BUN SERPL-MCNC: 20 MG/DL — SIGNIFICANT CHANGE UP (ref 7–23)
CALCIUM SERPL-MCNC: 8.8 MG/DL — SIGNIFICANT CHANGE UP (ref 8.4–10.5)
CHLORIDE SERPL-SCNC: 101 MMOL/L — SIGNIFICANT CHANGE UP (ref 96–108)
CO2 SERPL-SCNC: 27 MMOL/L — SIGNIFICANT CHANGE UP (ref 22–31)
COLOR SPEC: YELLOW — SIGNIFICANT CHANGE UP
CREAT SERPL-MCNC: 1.24 MG/DL — SIGNIFICANT CHANGE UP (ref 0.5–1.3)
DIFF PNL FLD: ABNORMAL
EGFR: 59 ML/MIN/1.73M2 — LOW
EOSINOPHIL # BLD AUTO: 0 K/UL — SIGNIFICANT CHANGE UP (ref 0–0.5)
EOSINOPHIL NFR BLD AUTO: 0 % — SIGNIFICANT CHANGE UP (ref 0–6)
EPI CELLS # UR: 1 — SIGNIFICANT CHANGE UP
GLUCOSE SERPL-MCNC: 94 MG/DL — SIGNIFICANT CHANGE UP (ref 70–99)
GLUCOSE UR QL: NEGATIVE MG/DL — SIGNIFICANT CHANGE UP
HCT VFR BLD CALC: 44.4 % — SIGNIFICANT CHANGE UP (ref 39–50)
HGB BLD-MCNC: 14.6 G/DL — SIGNIFICANT CHANGE UP (ref 13–17)
INR BLD: 1.11 RATIO — SIGNIFICANT CHANGE UP (ref 0.88–1.16)
KETONES UR-MCNC: ABNORMAL MG/DL
LACTATE SERPL-SCNC: 1.3 MMOL/L — SIGNIFICANT CHANGE UP (ref 0.7–2)
LACTATE SERPL-SCNC: 2.8 MMOL/L — HIGH (ref 0.7–2)
LEUKOCYTE ESTERASE UR-ACNC: ABNORMAL
LYMPHOCYTES # BLD AUTO: 0.88 K/UL — LOW (ref 1–3.3)
LYMPHOCYTES # BLD AUTO: 12 % — LOW (ref 13–44)
MANUAL SMEAR VERIFICATION: SIGNIFICANT CHANGE UP
MCHC RBC-ENTMCNC: 27.6 PG — SIGNIFICANT CHANGE UP (ref 27–34)
MCHC RBC-ENTMCNC: 32.9 GM/DL — SIGNIFICANT CHANGE UP (ref 32–36)
MCV RBC AUTO: 83.9 FL — SIGNIFICANT CHANGE UP (ref 80–100)
MONOCYTES # BLD AUTO: 0.07 K/UL — SIGNIFICANT CHANGE UP (ref 0–0.9)
MONOCYTES NFR BLD AUTO: 1 % — LOW (ref 2–14)
NEUTROPHILS # BLD AUTO: 6.22 K/UL — SIGNIFICANT CHANGE UP (ref 1.8–7.4)
NEUTROPHILS NFR BLD AUTO: 69 % — SIGNIFICANT CHANGE UP (ref 43–77)
NEUTS BAND # BLD: 16 % — HIGH (ref 0–8)
NITRITE UR-MCNC: NEGATIVE — SIGNIFICANT CHANGE UP
NRBC # BLD: 0 /100 — SIGNIFICANT CHANGE UP (ref 0–0)
PH UR: 5.5 — SIGNIFICANT CHANGE UP (ref 5–8)
PLAT MORPH BLD: NORMAL — SIGNIFICANT CHANGE UP
PLATELET # BLD AUTO: 214 K/UL — SIGNIFICANT CHANGE UP (ref 150–400)
POTASSIUM SERPL-MCNC: 3.5 MMOL/L — SIGNIFICANT CHANGE UP (ref 3.5–5.3)
POTASSIUM SERPL-SCNC: 3.5 MMOL/L — SIGNIFICANT CHANGE UP (ref 3.5–5.3)
PROT SERPL-MCNC: 7 G/DL — SIGNIFICANT CHANGE UP (ref 6–8.3)
PROT UR-MCNC: 100 MG/DL
PROTHROM AB SERPL-ACNC: 12.9 SEC — SIGNIFICANT CHANGE UP (ref 10.5–13.4)
RAPID RVP RESULT: SIGNIFICANT CHANGE UP
RBC # BLD: 5.29 M/UL — SIGNIFICANT CHANGE UP (ref 4.2–5.8)
RBC # FLD: 13.7 % — SIGNIFICANT CHANGE UP (ref 10.3–14.5)
RBC BLD AUTO: NORMAL — SIGNIFICANT CHANGE UP
RBC CASTS # UR COMP ASSIST: 4 /HPF — SIGNIFICANT CHANGE UP (ref 0–4)
SARS-COV-2 RNA SPEC QL NAA+PROBE: SIGNIFICANT CHANGE UP
SODIUM SERPL-SCNC: 136 MMOL/L — SIGNIFICANT CHANGE UP (ref 135–145)
SP GR SPEC: 1.04 — HIGH (ref 1–1.03)
TROPONIN I, HIGH SENSITIVITY RESULT: 6.4 NG/L — SIGNIFICANT CHANGE UP
UROBILINOGEN FLD QL: 0.2 MG/DL — SIGNIFICANT CHANGE UP (ref 0.2–1)
VARIANT LYMPHS # BLD: 2 % — SIGNIFICANT CHANGE UP (ref 0–6)
WBC # BLD: 7.32 K/UL — SIGNIFICANT CHANGE UP (ref 3.8–10.5)
WBC # FLD AUTO: 7.32 K/UL — SIGNIFICANT CHANGE UP (ref 3.8–10.5)
WBC UR QL: 16 /HPF — HIGH (ref 0–5)

## 2023-05-29 PROCEDURE — 71260 CT THORAX DX C+: CPT | Mod: 26,MA

## 2023-05-29 PROCEDURE — 74177 CT ABD & PELVIS W/CONTRAST: CPT | Mod: 26,MA

## 2023-05-29 PROCEDURE — 99285 EMERGENCY DEPT VISIT HI MDM: CPT

## 2023-05-29 PROCEDURE — 93010 ELECTROCARDIOGRAM REPORT: CPT

## 2023-05-29 RX ORDER — VANCOMYCIN HCL 1 G
1000 VIAL (EA) INTRAVENOUS ONCE
Refills: 0 | Status: COMPLETED | OUTPATIENT
Start: 2023-05-29 | End: 2023-05-29

## 2023-05-29 RX ORDER — CEFTRIAXONE 500 MG/1
1000 INJECTION, POWDER, FOR SOLUTION INTRAMUSCULAR; INTRAVENOUS EVERY 24 HOURS
Refills: 0 | Status: DISCONTINUED | OUTPATIENT
Start: 2023-05-30 | End: 2023-05-31

## 2023-05-29 RX ORDER — HEPARIN SODIUM 5000 [USP'U]/ML
5000 INJECTION INTRAVENOUS; SUBCUTANEOUS EVERY 8 HOURS
Refills: 0 | Status: DISCONTINUED | OUTPATIENT
Start: 2023-05-29 | End: 2023-05-31

## 2023-05-29 RX ORDER — CEFEPIME 1 G/1
1000 INJECTION, POWDER, FOR SOLUTION INTRAMUSCULAR; INTRAVENOUS ONCE
Refills: 0 | Status: COMPLETED | OUTPATIENT
Start: 2023-05-29 | End: 2023-05-29

## 2023-05-29 RX ORDER — SODIUM CHLORIDE 9 MG/ML
1000 INJECTION INTRAMUSCULAR; INTRAVENOUS; SUBCUTANEOUS ONCE
Refills: 0 | Status: COMPLETED | OUTPATIENT
Start: 2023-05-29 | End: 2023-05-29

## 2023-05-29 RX ORDER — ACETAMINOPHEN 500 MG
650 TABLET ORAL ONCE
Refills: 0 | Status: COMPLETED | OUTPATIENT
Start: 2023-05-29 | End: 2023-05-29

## 2023-05-29 RX ORDER — CHLORHEXIDINE GLUCONATE 213 G/1000ML
1 SOLUTION TOPICAL
Refills: 0 | Status: DISCONTINUED | OUTPATIENT
Start: 2023-05-29 | End: 2023-05-31

## 2023-05-29 RX ORDER — SODIUM CHLORIDE 9 MG/ML
2500 INJECTION INTRAMUSCULAR; INTRAVENOUS; SUBCUTANEOUS ONCE
Refills: 0 | Status: COMPLETED | OUTPATIENT
Start: 2023-05-29 | End: 2023-05-29

## 2023-05-29 RX ORDER — SODIUM CHLORIDE 9 MG/ML
500 INJECTION INTRAMUSCULAR; INTRAVENOUS; SUBCUTANEOUS ONCE
Refills: 0 | Status: COMPLETED | OUTPATIENT
Start: 2023-05-29 | End: 2023-05-29

## 2023-05-29 RX ADMIN — SODIUM CHLORIDE 1000 MILLILITER(S): 9 INJECTION INTRAMUSCULAR; INTRAVENOUS; SUBCUTANEOUS at 14:15

## 2023-05-29 RX ADMIN — Medication 650 MILLIGRAM(S): at 12:18

## 2023-05-29 RX ADMIN — Medication 650 MILLIGRAM(S): at 13:00

## 2023-05-29 RX ADMIN — CEFEPIME 1000 MILLIGRAM(S): 1 INJECTION, POWDER, FOR SOLUTION INTRAMUSCULAR; INTRAVENOUS at 13:25

## 2023-05-29 RX ADMIN — CEFEPIME 100 MILLIGRAM(S): 1 INJECTION, POWDER, FOR SOLUTION INTRAMUSCULAR; INTRAVENOUS at 12:18

## 2023-05-29 RX ADMIN — Medication 1000 MILLIGRAM(S): at 14:20

## 2023-05-29 RX ADMIN — SODIUM CHLORIDE 500 MILLILITER(S): 9 INJECTION INTRAMUSCULAR; INTRAVENOUS; SUBCUTANEOUS at 15:33

## 2023-05-29 RX ADMIN — Medication 250 MILLIGRAM(S): at 13:21

## 2023-05-29 RX ADMIN — SODIUM CHLORIDE 2500 MILLILITER(S): 9 INJECTION INTRAMUSCULAR; INTRAVENOUS; SUBCUTANEOUS at 12:20

## 2023-05-29 RX ADMIN — SODIUM CHLORIDE 2500 MILLILITER(S): 9 INJECTION INTRAMUSCULAR; INTRAVENOUS; SUBCUTANEOUS at 14:15

## 2023-05-29 RX ADMIN — HEPARIN SODIUM 5000 UNIT(S): 5000 INJECTION INTRAVENOUS; SUBCUTANEOUS at 22:01

## 2023-05-29 RX ADMIN — SODIUM CHLORIDE 500 MILLILITER(S): 9 INJECTION INTRAMUSCULAR; INTRAVENOUS; SUBCUTANEOUS at 16:21

## 2023-05-29 RX ADMIN — SODIUM CHLORIDE 1000 MILLILITER(S): 9 INJECTION INTRAMUSCULAR; INTRAVENOUS; SUBCUTANEOUS at 15:17

## 2023-05-29 NOTE — H&P ADULT - NSHPPHYSICALEXAM_GEN_ALL_CORE
T(C): 40.4 (05-29-23 @ 12:04), Max: 40.4 (05-29-23 @ 12:04)  HR: 67 (05-29-23 @ 19:24) (67 - 99)  BP: 96/64 (05-29-23 @ 19:24) (77/60 - 110/58)  RR: 21 (05-29-23 @ 19:24) (16 - 25)  SpO2: 99% (05-29-23 @ 19:24) (94% - 99%)    CONSTITUTIONAL: Well groomed, no apparent distress; alert calm cooperative, pleasant   EYES: PERRLA and symmetric, EOMI, No conjunctival or scleral injection, non-icteric  RESP: No respiratory distress, no use of accessory muscles; CTA b/l  CV: RRR, +S1S2 noted  GI: Soft, NT, ND, no rebound, no guarding  LYMPH: No cervical LAD or tenderness; no axillary LAD or tenderness; no inguinal LAD or tenderness  MSK: Normal gait; No digital clubbing or cyanosis; examination of the (head/neck/spine/ribs/pelvis, RUE, LUE, RLE, LLE) without misalignment,            Normal ROM without pain, no spinal tenderness, normal muscle strength/tone  SKIN: No rashes or ulcers noted; no subcutaneous nodules or induration palpable  NEURO: CN II-XII intact; normal reflexes in upper and lower extremities, sensation intact in upper and lower extremities b/l to light touch   PSYCH: Appropriate insight/judgment; A+O x 3, mood and affect appropriate, recent/remote memory intact

## 2023-05-29 NOTE — ED PROVIDER NOTE - NS ED ATTENDING STATEMENT MOD
This was a shared visit with the [facility]. I reviewed and verified the documentation and independently performed the documented:

## 2023-05-29 NOTE — PROGRESS NOTE ADULT - ASSESSMENT
82 y/o M w/ pmh of htn, hld, previous hx of urinary retention 2/2 to BPH today presented to [facility] for fevers, chills and malaise along w/ hypotension and mild lactic acidosis. In the ED pt was found to be in urosepsis and underwent an CT chest/abd/pelvis was also found to have partially thrombosed common hepatic artery aneurysm. Pt was given appx 4L of IVF for hypotension and admitted to the MICU for urosepsis.        Active Problem List  1. Hypotension now s/p 4L of IVF now resolved cont to monitor closely  2. Urosepsis likely 2/2 to UTI on IVAB f/u on UCX and blood cx  3. Lactic acidosis now resolved post IVF  4. incidental finding  82 y/o M w/ pmh of htn, hld, previous hx of urinary retention 2/2 to BPH today presented to [facility] for fevers, chills and malaise along w/ hypotension and mild lactic acidosis. In the ED pt was found to be in urosepsis and underwent an CT chest/abd/pelvis was also found to have partially thrombosed common hepatic artery aneurysm. Pt was given appx 4L of IVF for hypotension and admitted to the MICU for urosepsis.        Active Problem List  1. Hypotension now s/p 4L of IVF now resolved cont to monitor closely  2. Urosepsis likely 2/2 to UTI on IVAB f/u on UCX and blood cx  3. Lactic acidosis now resolved post IVF  4. 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 will defer to primary medical/day team for further eval, I have personally informed the patient of this incidental finding and that it needs to be followed up with further evaulation, pt states he understands and will have it followed up.    A/P: Pt ordinally admitted to the MICU for Hypotension, lactic acidosis 2/2 to urosepsis now improved s/p 4L of IVF, his HDS have been stable over the last several hours, his MS Stable, he dose not require ICU level care anymore, we will downgrade pt the telemetery and medical team will take over this pt care, I have d/w this with the ED team as the pt currently in the ED as hold, noctornist dr. barker and eicu dr. ernst all in agreement with plan, please reconsult MICU for any acute changes in pt condition.

## 2023-05-29 NOTE — H&P ADULT - NSHPLABSRESULTS_GEN_ALL_CORE
CBC Full  -  ( 29 May 2023 11:56 )  WBC Count : 7.32 K/uL  RBC Count : 5.29 M/uL  Hemoglobin : 14.6 g/dL  Hematocrit : 44.4 %  Platelet Count - Automated : 214 K/uL  Mean Cell Volume : 83.9 fl  Mean Cell Hemoglobin : 27.6 pg  Mean Cell Hemoglobin Concentration : 32.9 gm/dL  Auto Neutrophil # : 6.22 K/uL  Auto Lymphocyte # : 0.88 K/uL  Auto Monocyte # : 0.07 K/uL  Auto Eosinophil # : 0.00 K/uL  Auto Basophil # : 0.00 K/uL  Auto Neutrophil % : 69.0 %  Auto Lymphocyte % : 12.0 %  Auto Monocyte % : 1.0 %  Auto Eosinophil % : 0.0 %  Auto Basophil % : 0.0 %          136  |  101  |  20  ----------------------------<  94  3.5   |  27  |  1.24    Ca    8.8      29 May 2023 11:56    TPro  7.0  /  Alb  3.6  /  TBili  0.9  /  DBili  x   /  AST  41<H>  /  ALT  39  /  AlkPhos  56      LIVER FUNCTIONS - ( 29 May 2023 11:56 )  Alb: 3.6 g/dL / Pro: 7.0 g/dL / ALK PHOS: 56 U/L / ALT: 39 U/L / AST: 41 U/L / GGT: x               PT/INR - ( 29 May 2023 11:56 )   PT: 12.9 sec;   INR: 1.11 ratio    PTT - ( 29 May 2023 11:56 )  PTT:25.7 sec      Urinalysis Basic - ( 29 May 2023 13:00 )  Color: Yellow / Appearance: Cloudy / S.045 / pH: x  Gluc: x / Ketone: Trace mg/dL  / Bili: Negative / Urobili: 0.2 mg/dL   Blood: x / Protein: 100 mg/dL / Nitrite: Negative   Leuk Esterase: Large / RBC: 4 /HPF / WBC 16 /HPF   Sq Epi: x / Non Sq Epi: x / Bacteria: Occasional /HPF      Lactate, Blood: 2.8 --->1.3        RADIOLOGY  CT CHEST ABD / PELV  IMPRESSION:  Questionable cystitis. Correlate with urinalysis.  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  No evidence of pneumonia.

## 2023-05-29 NOTE — ED ADULT NURSE NOTE - NSICDXPASTSURGICALHX_GEN_ALL_CORE_FT
PAST SURGICAL HISTORY:  Enlarged prostate surgery done in oct 2020    H/O colonoscopy     Status post Mohs surgery chest - 1982

## 2023-05-29 NOTE — ED ADULT TRIAGE NOTE - ARRIVAL INFO ADDITIONAL COMMENTS
Patient BIB wife for tremors and general malaise starting yesterday. Patient is alert and oriented to place only, not time. Patient with notable tremors, unsteady gait. Wheelchair provided in triage and brought to room and registered. Patient endorsing that he feels cold, denies fevers at home. Medical history of HTN, High cholesterol. Temp 99.9.

## 2023-05-29 NOTE — PROGRESS NOTE ADULT - SUBJECTIVE AND OBJECTIVE BOX
HPI: 82 y/o M w/ pmh of htn, hld, previous hx of urinary retention 2/2 to BPH today presented to [facility] for fevers, chills and malaise along w/ hypotension and mild lactic acidosis. In the ED pt was found to be in urosepsis and underwent an CT chest/abd/pelvis was also found to have partially thrombosed common hepatic artery aneurysm. Pt was given appx 4L of IVF for hypotension and admitted to the MICU for urosepsis.      24 hour events: Tonight pt reevaluated in the ED, now comfortable w/out any complains, states his fevers, chills and malaise is resolved. He is no longer on IVF, his SBP have been between high  w/ MAP >65 for the last several hours. Case d/w ED team, noctorist dr. barker and eicu dr. ernst everyone is in agreement with the plan to downgrade pt to medical team and recommend pt be admitted to telemetry. Medical team can reconsult ICU for any acute HD changes in pt condition, pt is made aware of this plan and in agreement     Review of Systems:  Constitutional: No fever, chills, fatigue  Neuro: No headache, numbness, weakness  Resp: No cough, wheezing, shortness of breath  CVS: No chest pain, palpitations, leg swelling  GI: No abdominal pain, nausea, vomiting, diarrhea   : No dysuria, frequency, incontinence  Skin: No itching, burning, rashes, or lesions   Msk: No joint pain or swelling  Psych: No depression, anxiety, mood swings    T(F): 104.8 (23 @ 12:04), Max: 104.8 (23 @ 12:04)  HR: 70 (23 @ 22:53) (67 - 99)  BP: 108/64 (23 @ 22:53) (77/60 - 110/58)  RR: 15 (23 @ 22:53) (15 - 25)  SpO2: 99% (23 @ 22:53) (94% - 99%)  Wt(kg): --        CAPILLARY BLOOD GLUCOSE          I&O's Summary      Physical Exam:   Gen: Comfortable in bed in NAD  Neuro: AAOx3  HEENT: NC/AT  Resp: CTA b/l  CVS: +RRR  Abd: BSx4, soft, nt/nd, no CVAT b/l  Ext: +RRR  Skin: warm/dry    Meds:  cefTRIAXone   IVPB IV Intermittent              heparin   Injectable SubCutaneous            chlorhexidine 2% Cloths Topical                              14.6   7.32  )-----------( 214      ( 29 May 2023 11:56 )             44.4     Bands 16.0        136  |  101  |  20  ----------------------------<  94  3.5   |  27  |  1.24    Ca    8.8      29 May 2023 11:56    TPro  7.0  /  Alb  3.6  /  TBili  0.9  /  DBili  x   /  AST  41<H>  /  ALT  39  /  AlkPhos  56      Lactate 1.3            @ 13:23    Lactate 2.8            @ 11:56          PT/INR - ( 29 May 2023 11:56 )   PT: 12.9 sec;   INR: 1.11 ratio         PTT - ( 29 May 2023 11:56 )  PTT:25.7 sec  Urinalysis Basic - ( 29 May 2023 13:00 )    Color: Yellow / Appearance: Cloudy / S.045 / pH: x  Gluc: x / Ketone: Trace mg/dL  / Bili: Negative / Urobili: 0.2 mg/dL   Blood: x / Protein: 100 mg/dL / Nitrite: Negative   Leuk Esterase: Large / RBC: 4 /HPF / WBC 16 /HPF   Sq Epi: x / Non Sq Epi: x / Bacteria: Occasional /HPF        Rapid RVP Result: NotDetec ( @ 12:30)        Radiology:     < from: CT Abdomen and Pelvis w/ IV Cont (23 @ 13:36) >    ACC: [id number] EXAM:  CT ABDOMEN AND PELVIS IC   ORDERED BY: MIGUEL LOVELACE     PROCEDURE DATE:  2023          INTERPRETATION:  CLINICAL INFORMATION: Abdominal pain and fever.    COMPARISON: None.    CONTRAST/COMPLICATIONS:  IV Contrast:Omnipaque 300  90 cc administered   10 cc discarded  Oral Contrast: NONE  Complications: None reported at time of study completion    PROCEDURE:  CT of the Chest, Abdomen and Pelvis was performed.  Sagittal and coronal reformats were performed.    FINDINGS:    CHEST:  LUNGS AND LARGE AIRWAYS: Patent central airways. Mild apical scarring.   Minimal linear atelectasis in the lingula and left lower lobe. No   evidence of pneumonia. No pulmonary nodules.  PLEURA: No pleural effusion.  VESSELS: Atherosclerotic changes of the aorta and coronary arteries.  HEART: Heart size is normal. No pericardial effusion.  MEDIASTINUM AND ROSA: No lymphadenopathy.  CHEST WALL AND LOWER NECK: Within normal limits.    ABDOMEN AND PELVIS:  LIVER: Normal in size andmorphology with small hypodense nodule in the   left lobe that is too small to characterize.  BILE DUCTS: Normal caliber.  GALLBLADDER: Within normal limits.  SPLEEN: Within normal limits.  PANCREAS: Within normal limits.  ADRENALS: Within normal limits.  KIDNEYS/URETERS: Lower pole renal cyst and subcentimeter left mid renal   nodule that is too small to characterize but likely to represent a cyst.   Mild right upper pole renal cortical scarring. No hydronephrosis.    BLADDER: Although not distended, the bladder wall may be slightly   thickened with perivesical inflammation. Correlate for cystitis.  REPRODUCTIVE ORGANS: Prostate is not enlarged.    BOWEL: Duodenal and colonic diverticulosis without evidence of acute   diverticulitis.. No bowel obstruction. Appendix is normal.  PERITONEUM: No ascites or pneumoperitoneum.  VESSELS: Atherosclerotic changes. There is a large partially thrombosed   aneurysm of the common hepatic artery measuring at least 4.3 cm. Hepatic   and portal veins andSMV are patent.  RETROPERITONEUM/LYMPH NODES: No lymphadenopathy.  ABDOMINAL WALL: Within normal limits.  BONES: Degenerative changes.    IMPRESSION:  Questionable cystitis. Correlate with urinalysis.    Large, at least 4.3 cm partially thrombosed common hepatic artery   aneurysm.    No evidence of pneumonia.    --- End of Report ---            MALIK DE LA TORRE MD; Attending Radiologist  This document has been electronically signed. May 29 2023  1:59PM    < end of copied text >      CENTRAL LINE: N/Y          DATE INSERTED:              REMOVE: Y/N  VIVAR: N/Y                       DATE INSERTED:              REMOVE: Y/N  A-LINE: N/Y                       DATE INSERTED:              REMOVE: Y/N    GLOBAL ISSUE/BEST PRACTICE:  Analgesia:  Sedation:  CAM-ICU:   HOB elevation: yes  Stress ulcer prophylaxis:  VTE prophylaxis:  Glycemic control:  Nutrition:    CODE STATUS: ***    CRITICAL CARE TIME SPENT:  (Assessing presenting problems of acute illness, which pose high probability of life threatening deterioration or end organ damage/dysfunction, as well as medical decision making including initiating plan of care, reviewing data, reviewing radiologic exams, discussing with multidisciplinary team,  discussing goals of care with patient/family, and writing this note.  Non-inclusive of procedures performed)     HPI: 84 y/o M w/ pmh of htn, hld, previous hx of urinary retention 2/2 to BPH today presented to [facility] for fevers, chills and malaise along w/ hypotension and mild lactic acidosis. In the ED pt was found to be in urosepsis and underwent an CT chest/abd/pelvis was also found to have partially thrombosed common hepatic artery aneurysm. Pt was given appx 4L of IVF for hypotension and admitted to the MICU for urosepsis.      24 hour events: Tonight pt reevaluated in the ED, now comfortable w/out any complains, states his fevers, chills and malaise is resolved. He is no longer on IVF, his SBP have been between high  w/ MAP >65 for the last several hours. Case d/w ED team, noctorist dr. barker and eicu dr. ernst everyone is in agreement with the plan to downgrade pt to medical team and recommend pt be admitted to telemetry. Medical team can reconsult ICU for any acute HD changes in pt condition, pt is made aware of this plan and in agreement     Review of Systems:  Constitutional: No fever, chills, fatigue  Neuro: No headache, numbness, weakness  Resp: No cough, wheezing, shortness of breath  CVS: No chest pain, palpitations, leg swelling  GI: No abdominal pain, nausea, vomiting, diarrhea   : No dysuria, frequency, incontinence  Skin: No itching, burning, rashes, or lesions   Msk: No joint pain or swelling  Psych: No depression, anxiety, mood swings    T(F): 104.8 (23 @ 12:04), Max: 104.8 (23 @ 12:04)  HR: 70 (23 @ 22:53) (67 - 99)  BP: 108/64 (23 @ 22:53) (77/60 - 110/58)  RR: 15 (23 @ 22:53) (15 - 25)  SpO2: 99% (23 @ 22:53) (94% - 99%)  Wt(kg): --        CAPILLARY BLOOD GLUCOSE          I&O's Summary      Physical Exam:   Gen: Comfortable in bed in NAD  Neuro: AAOx3  HEENT: NC/AT  Resp: CTA b/l  CVS: +RRR  Abd: BSx4, soft, nt/nd, no CVAT b/l  Ext: +RRR  Skin: warm/dry    Meds:  cefTRIAXone   IVPB IV Intermittent              heparin   Injectable SubCutaneous            chlorhexidine 2% Cloths Topical                              14.6   7.32  )-----------( 214      ( 29 May 2023 11:56 )             44.4     Bands 16.0        136  |  101  |  20  ----------------------------<  94  3.5   |  27  |  1.24    Ca    8.8      29 May 2023 11:56    TPro  7.0  /  Alb  3.6  /  TBili  0.9  /  DBili  x   /  AST  41<H>  /  ALT  39  /  AlkPhos  56      Lactate 1.3            @ 13:23    Lactate 2.8            @ 11:56          PT/INR - ( 29 May 2023 11:56 )   PT: 12.9 sec;   INR: 1.11 ratio         PTT - ( 29 May 2023 11:56 )  PTT:25.7 sec  Urinalysis Basic - ( 29 May 2023 13:00 )    Color: Yellow / Appearance: Cloudy / S.045 / pH: x  Gluc: x / Ketone: Trace mg/dL  / Bili: Negative / Urobili: 0.2 mg/dL   Blood: x / Protein: 100 mg/dL / Nitrite: Negative   Leuk Esterase: Large / RBC: 4 /HPF / WBC 16 /HPF   Sq Epi: x / Non Sq Epi: x / Bacteria: Occasional /HPF        Rapid RVP Result: NotDetec ( @ 12:30)        Radiology:     < from: CT Abdomen and Pelvis w/ IV Cont (23 @ 13:36) >    ACC: [id number] EXAM:  CT ABDOMEN AND PELVIS IC   ORDERED BY: MIGUEL LOVELACE     PROCEDURE DATE:  2023          INTERPRETATION:  CLINICAL INFORMATION: Abdominal pain and fever.    COMPARISON: None.    CONTRAST/COMPLICATIONS:  IV Contrast:Omnipaque 300  90 cc administered   10 cc discarded  Oral Contrast: NONE  Complications: None reported at time of study completion    PROCEDURE:  CT of the Chest, Abdomen and Pelvis was performed.  Sagittal and coronal reformats were performed.    FINDINGS:    CHEST:  LUNGS AND LARGE AIRWAYS: Patent central airways. Mild apical scarring.   Minimal linear atelectasis in the lingula and left lower lobe. No   evidence of pneumonia. No pulmonary nodules.  PLEURA: No pleural effusion.  VESSELS: Atherosclerotic changes of the aorta and coronary arteries.  HEART: Heart size is normal. No pericardial effusion.  MEDIASTINUM AND ROSA: No lymphadenopathy.  CHEST WALL AND LOWER NECK: Within normal limits.    ABDOMEN AND PELVIS:  LIVER: Normal in size andmorphology with small hypodense nodule in the   left lobe that is too small to characterize.  BILE DUCTS: Normal caliber.  GALLBLADDER: Within normal limits.  SPLEEN: Within normal limits.  PANCREAS: Within normal limits.  ADRENALS: Within normal limits.  KIDNEYS/URETERS: Lower pole renal cyst and subcentimeter left mid renal   nodule that is too small to characterize but likely to represent a cyst.   Mild right upper pole renal cortical scarring. No hydronephrosis.    BLADDER: Although not distended, the bladder wall may be slightly   thickened with perivesical inflammation. Correlate for cystitis.  REPRODUCTIVE ORGANS: Prostate is not enlarged.    BOWEL: Duodenal and colonic diverticulosis without evidence of acute   diverticulitis.. No bowel obstruction. Appendix is normal.  PERITONEUM: No ascites or pneumoperitoneum.  VESSELS: Atherosclerotic changes. There is a large partially thrombosed   aneurysm of the common hepatic artery measuring at least 4.3 cm. Hepatic   and portal veins andSMV are patent.  RETROPERITONEUM/LYMPH NODES: No lymphadenopathy.  ABDOMINAL WALL: Within normal limits.  BONES: Degenerative changes.    IMPRESSION:  Questionable cystitis. Correlate with urinalysis.    Large, at least 4.3 cm partially thrombosed common hepatic artery   aneurysm.    No evidence of pneumonia.    --- End of Report ---            MALIK DE LA TORRE MD; Attending Radiologist  This document has been electronically signed. May 29 2023  1:59PM    < end of copied text >          CODE STATUS: FULL CODE

## 2023-05-29 NOTE — PROVIDER CONTACT NOTE (EICU) - BACKGROUND
84yo male with a PMH of HTN, HLD  and urinary retention / LUTS who presented to [facility] ED with c/o AMS, malaise, chills and fever x 1 day. Pt noted to be with borderline hypotension despite IVF resuscitation efforts by ED staff and empiric abx coverage. Urine cloudy.

## 2023-05-29 NOTE — H&P ADULT - HISTORY OF PRESENT ILLNESS
Mr. Garces is an 83 year old male with a PMH of HTN, HLD  and urinary retention / LUTS who presented to [facility] ED with c/o AMS, malaise, chills and fever x 1 day. Pt noted to be with borderline hypotension despite IVF resuscitation efforts by ED staff and empiric abx coverage. Urine cloudy. Lactate cleared from 2.8-->1.3. ICU team consulted.

## 2023-05-29 NOTE — PROVIDER CONTACT NOTE (EICU) - RECOMMENDATIONS
ICU adm  empiric tx for possible UTI/sepsis  sepsis protocol  consider vascular consult for hepatic artery anr

## 2023-05-29 NOTE — ED PROVIDER NOTE - ATTENDING APP SHARED VISIT CONTRIBUTION OF CARE
patient with hx htn, htn, brought by wife with chills x 1 day   confusion  patient awake but confused  neck supple, no meningeal signs  [id number], cta b/l  abd soft non tender  moving all extremities  no rash    concern for fever ,sepsis, workup, admit

## 2023-05-29 NOTE — H&P ADULT - ASSESSMENT
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 MAP remaining >65 mmHg, would initiate pressor support if req to maintain MAP >65 mmHg; POCUS eval   --s/p cefepime / vanco in ED, fup cultures; would use ceftriaxone   --diet as tolerated  --fup labs  --monitor i/o's, renal fx  --sqh for dvt prophylaxis  --consider vascular consult for eval of hepatic artery aneurysm and possible OP followup  --pt / family education / emotional support  --full code status        d/w eicu

## 2023-05-29 NOTE — ED ADULT NURSE NOTE - OBJECTIVE STATEMENT
Patient presents to ED complaining of chest pain, arm pain, dizziness, lightheadedness, chills since last night. Patient is altered mental status as per wife and has difficulty answering simple questions at this time. Patient denies fever at home but is tactilely febrile. Patient denies n/v/d or urinary symptoms. Patient states this has happened before in the past but did not go away this time.

## 2023-05-29 NOTE — ED PROVIDER NOTE - OBJECTIVE STATEMENT
83 yr old male with hx of HLD, HTN presents with wife due to generalized malaise, chills, " tremors" x 1 day. Wife reports increase in confusion since yesterday. Pt usually AOX 3 at baseline. No known fever at home.

## 2023-05-29 NOTE — ED PROVIDER NOTE - CARE PLAN
1 Principal Discharge DX:	UTI (urinary tract infection)  Secondary Diagnosis:	Sepsis   Principal Discharge DX:	UTI (urinary tract infection)  Secondary Diagnosis:	Sepsis  Secondary Diagnosis:	Hypotension

## 2023-05-29 NOTE — ED ADULT NURSE NOTE - NSFALLRISKINTERV_ED_ALL_ED
Assistance OOB with selected safe patient handling equipment if applicable/Assistance with ambulation/Communicate fall risk and risk factors to all staff, patient, and family/Monitor gait and stability/Monitor for mental status changes and reorient to person, place, and time, as needed/Provide visual cue: yellow wristband, yellow gown, etc/Reinforce activity limits and safety measures with patient and family/Toileting schedule using arm’s reach rule for commode and bathroom/Use of alarms - bed, stretcher, chair and/or video monitoring/Call bell, personal items and telephone in reach/Instruct patient to call for assistance before getting out of bed/chair/stretcher/Non-slip footwear applied when patient is off stretcher/[place] to call system/Physically safe environment - no spills, clutter or unnecessary equipment/Purposeful Proactive Rounding/Room/bathroom lighting operational, light cord in reach

## 2023-05-29 NOTE — ED PROVIDER NOTE - CLINICAL SUMMARY MEDICAL DECISION MAKING FREE TEXT BOX
83 yr old male with hx of HLD, HTN presents with wife due to generalized malaise, chills, " tremors" x 1 day. Wife reports increase in confusion since yesterday. Pt usually AOX 3 at baseline. No known fever at home.    + rectal temp of 104.8 noted, sepsis fluids, labs and abx given   ct chest and abd pelvis ordered 83 yr old male with hx of HLD, HTN presents with wife due to generalized malaise, chills, " tremors" x 1 day. Wife reports increase in confusion since yesterday. Pt usually AOX 3 at baseline. No known fever at home.    + rectal temp of 104.8 noted, sepsis fluids, labs and abx given   ct chest and abd pelvis ordered   labs and imaging reviewed,  + UTI   plan to admit to medicine. d/w with hospitalist and agreed with plan. Pt and wife agree with plan. 83 yr old male with hx of HLD, HTN presents with wife due to generalized malaise, chills, " tremors" x 1 day. Wife reports increase in confusion since yesterday. Pt usually AOX 3 at baseline. No known fever at home.    + rectal temp of 104.8 noted, sepsis fluids, labs and abx given   ct chest and abd pelvis ordered   labs and imaging reviewed,  + UTI   230pm: plan to admit to medicine. d/w with hospitalist and agreed with plan. Pt and wife agree with plan.   235: Bp Low, will give another IV NS 1L bolus,  and will repeat BP, ICU made aware and agrees with plan 83 yr old male with hx of HLD, HTN presents with wife due to generalized malaise, chills, " tremors" x 1 day. Wife reports increase in confusion since yesterday. Pt usually AOX 3 at baseline. No known fever at home.    + rectal temp of 104.8 noted, sepsis fluids, labs and abx given   ct chest and abd pelvis ordered   labs and imaging reviewed,  + UTI   230pm: plan to admit to medicine. d/w with hospitalist and agreed with plan. Pt and wife agree with plan.   235pm: Bp Low, will give another IV NS 1L bolus,  and will repeat BP, ICU made aware and agrees with plan  330PM: bolus completed and repeat BP 85/57. ICU will see pt at bedside for consult 83 yr old male with hx of HLD, HTN presents with wife due to generalized malaise, chills, " tremors" x 1 day. Wife reports increase in confusion since yesterday. Pt usually AOX 3 at baseline. No known fever at home.    + rectal temp of 104.8 noted, sepsis fluids, labs and abx given   ct chest and abd pelvis ordered   labs and imaging reviewed,  + UTI   230pm: plan to admit to medicine. d/w with hospitalist and agreed with plan. Pt and wife agree with plan.   235pm: Bp Low, will give another IV NS 1L bolus,  and will repeat BP, ICU made aware and agrees with plan  330PM: bolus completed and repeat BP 85/57. ICU called to update and recommends to give another 500ml IV bolus 83 yr old male with hx of HLD, HTN presents with wife due to generalized malaise, chills, " tremors" x 1 day. Wife reports increase in confusion since yesterday. Pt usually AOX 3 at baseline. No known fever at home.    + rectal temp of 104.8 noted, sepsis fluids, labs and abx given   ct chest and abd pelvis ordered   labs and imaging reviewed,  + UTI   230pm: plan to admit to medicine. d/w with hospitalist and agreed with plan. Pt and wife agree with plan.   235pm: Bp Low, will give another IV NS 1L bolus,  and will repeat BP, ICU made aware and agrees with plan  330PM: bolus completed and repeat BP 85/57. ICU called to update and recommends to give another 500ml IV bolus  515pm: pt given 500ml bolus and BP has not improved. ICU accepted pt at this time.

## 2023-05-30 DIAGNOSIS — I72.8 ANEURYSM OF OTHER SPECIFIED ARTERIES: ICD-10-CM

## 2023-05-30 LAB
ALBUMIN SERPL ELPH-MCNC: 2.7 G/DL — LOW (ref 3.3–5)
ALP SERPL-CCNC: 41 U/L — SIGNIFICANT CHANGE UP (ref 40–120)
ALT FLD-CCNC: 29 U/L — SIGNIFICANT CHANGE UP (ref 10–45)
ANION GAP SERPL CALC-SCNC: 8 MMOL/L — SIGNIFICANT CHANGE UP (ref 5–17)
AST SERPL-CCNC: 28 U/L — SIGNIFICANT CHANGE UP (ref 10–40)
BILIRUB SERPL-MCNC: 0.6 MG/DL — SIGNIFICANT CHANGE UP (ref 0.2–1.2)
BUN SERPL-MCNC: 15 MG/DL — SIGNIFICANT CHANGE UP (ref 7–23)
CALCIUM SERPL-MCNC: 8.4 MG/DL — SIGNIFICANT CHANGE UP (ref 8.4–10.5)
CHLORIDE SERPL-SCNC: 106 MMOL/L — SIGNIFICANT CHANGE UP (ref 96–108)
CO2 SERPL-SCNC: 27 MMOL/L — SIGNIFICANT CHANGE UP (ref 22–31)
CREAT SERPL-MCNC: 1.19 MG/DL — SIGNIFICANT CHANGE UP (ref 0.5–1.3)
EGFR: 61 ML/MIN/1.73M2 — SIGNIFICANT CHANGE UP
GLUCOSE SERPL-MCNC: 87 MG/DL — SIGNIFICANT CHANGE UP (ref 70–99)
HCT VFR BLD CALC: 37.7 % — LOW (ref 39–50)
HGB BLD-MCNC: 12.3 G/DL — LOW (ref 13–17)
MAGNESIUM SERPL-MCNC: 1.7 MG/DL — SIGNIFICANT CHANGE UP (ref 1.6–2.6)
MCHC RBC-ENTMCNC: 27.2 PG — SIGNIFICANT CHANGE UP (ref 27–34)
MCHC RBC-ENTMCNC: 32.6 GM/DL — SIGNIFICANT CHANGE UP (ref 32–36)
MCV RBC AUTO: 83.4 FL — SIGNIFICANT CHANGE UP (ref 80–100)
NRBC # BLD: 0 /100 WBCS — SIGNIFICANT CHANGE UP (ref 0–0)
PHOSPHATE SERPL-MCNC: 3.1 MG/DL — SIGNIFICANT CHANGE UP (ref 2.5–4.5)
PLATELET # BLD AUTO: 196 K/UL — SIGNIFICANT CHANGE UP (ref 150–400)
POTASSIUM SERPL-MCNC: 3.6 MMOL/L — SIGNIFICANT CHANGE UP (ref 3.5–5.3)
POTASSIUM SERPL-SCNC: 3.6 MMOL/L — SIGNIFICANT CHANGE UP (ref 3.5–5.3)
PROT SERPL-MCNC: 5.9 G/DL — LOW (ref 6–8.3)
RBC # BLD: 4.52 M/UL — SIGNIFICANT CHANGE UP (ref 4.2–5.8)
RBC # FLD: 14.1 % — SIGNIFICANT CHANGE UP (ref 10.3–14.5)
SODIUM SERPL-SCNC: 141 MMOL/L — SIGNIFICANT CHANGE UP (ref 135–145)
WBC # BLD: 17.94 K/UL — HIGH (ref 3.8–10.5)
WBC # FLD AUTO: 17.94 K/UL — HIGH (ref 3.8–10.5)

## 2023-05-30 PROCEDURE — 99233 SBSQ HOSP IP/OBS HIGH 50: CPT

## 2023-05-30 PROCEDURE — 99223 1ST HOSP IP/OBS HIGH 75: CPT

## 2023-05-30 PROCEDURE — 99222 1ST HOSP IP/OBS MODERATE 55: CPT

## 2023-05-30 RX ORDER — ATORVASTATIN CALCIUM 80 MG/1
20 TABLET, FILM COATED ORAL AT BEDTIME
Refills: 0 | Status: DISCONTINUED | OUTPATIENT
Start: 2023-05-30 | End: 2023-05-31

## 2023-05-30 RX ADMIN — HEPARIN SODIUM 5000 UNIT(S): 5000 INJECTION INTRAVENOUS; SUBCUTANEOUS at 22:00

## 2023-05-30 RX ADMIN — ATORVASTATIN CALCIUM 20 MILLIGRAM(S): 80 TABLET, FILM COATED ORAL at 22:00

## 2023-05-30 RX ADMIN — HEPARIN SODIUM 5000 UNIT(S): 5000 INJECTION INTRAVENOUS; SUBCUTANEOUS at 14:11

## 2023-05-30 RX ADMIN — CEFTRIAXONE 100 MILLIGRAM(S): 500 INJECTION, POWDER, FOR SOLUTION INTRAMUSCULAR; INTRAVENOUS at 05:13

## 2023-05-30 NOTE — PROGRESS NOTE ADULT - ASSESSMENT
83 year old male with a PMH of HTN, HLD  and urinary retention / LUTS initially aceepted to ICU, subsequently downgraded after improvement in hemodynamics s/p 4l IVF.    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 Key Pedraza  --f/up ID recs to determine abx duration  --full code status

## 2023-05-30 NOTE — CONSULT NOTE ADULT - ASSESSMENT
83M here with UTI. Vascular surgery consulted for incidental finding of common hepatic artery aneurysm thrombosis.  - F/u CTA abdomen/pelvis to further evaluate if a true thrombosis  - Medical management and supportive care as per primary team  Plan discussed with Dr. Mackey

## 2023-05-30 NOTE — PATIENT PROFILE ADULT - FUNCTIONAL ASSESSMENT - BASIC MOBILITY 4.
Abdomen soft, non-tender and non-distended, no rebound, no guarding and no masses. no hepatosplenomegaly.
4 = No assist / stand by assistance

## 2023-05-30 NOTE — CONSULT NOTE ADULT - NS ATTEND AMEND GEN_ALL_CORE FT
CT reviewed. Evidence of common hepatic aneurysm. Unable to eval if completely thrombosed or not on CT. Unrelated to admission pathology but given size, further workup is necessary to plan conservative management vs intervention. Will follow.
Agree

## 2023-05-30 NOTE — CONSULT NOTE ADULT - SUBJECTIVE AND OBJECTIVE BOX
INTERVAL HPI/OVERNIGHT EVENTS:  HPI:  Mr. Garces is an 83 year old male with a PMH of HTN, HLD  and urinary retention / LUTS who presented to [facility] ED with c/o AMS, malaise, chills and fever x 1 day. Pt noted to be with borderline hypotension despite IVF resuscitation efforts by ED staff and empiric abx coverage. Urine cloudy. Lactate cleared from 2.8-->1.3. ICU team consulted.  (29 May 2023 17:44)    GI consulted to see patient due to incidental CT findings. Patient seen and examined. He originally presented to hospital due to weakness and chills, found to have 104 temp and UTI. 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 Patient denies abdominal pain, nausea, vomiting. No hematemesis, coffee ground emesis, BRBPR or melena. Last colonoscopy was a few years ago and reportedly normal. Patient does have first degree relative with colon cancer.         MEDICATIONS  (STANDING):  atorvastatin 20 milliGRAM(s) Oral at bedtime  cefTRIAXone   IVPB 1000 milliGRAM(s) IV Intermittent every 24 hours  chlorhexidine 2% Cloths 1 Application(s) Topical <User Schedule>  heparin   Injectable 5000 Unit(s) SubCutaneous every 8 hours    MEDICATIONS  (PRN):      Allergies    No Known Allergies    Intolerances        PAST MEDICAL & SURGICAL HISTORY:  Urinary retention      HTN (hypertension)      HLD (hyperlipidemia)      Melanoma      Urinary retention      Enlarged prostate with lower urinary tract symptoms (LUTS)      Yin catheter status      Status post Mohs surgery  chest -       H/O colonoscopy      Enlarged prostate  surgery done in oct 2020          REVIEW OF SYSTEMS: negative unless indicated in HPI    non smoker  + etoh use 1-2 drinks 4-5 days a week       PHYSICAL EXAM:   Vital Signs:  Vital Signs Last 24 Hrs  T(C): 36.7 (30 May 2023 09:30), Max: 36.9 (30 May 2023 05:09)  T(F): 98.1 (30 May 2023 09:30), Max: 98.5 (30 May 2023 05:09)  HR: 68 (30 May 2023 09:30) (67 - 78)  BP: 93/62 (30 May 2023 09:30) (87/59 - 108/64)  BP(mean): 77 (29 May 2023 22:53) (68 - 77)  RR: 16 (30 May 2023 09:30) (15 - 21)  SpO2: 98% (30 May 2023 09:30) (98% - 99%)    Parameters below as of 30 May 2023 09:30  Patient On (Oxygen Delivery Method): room air      Daily     Daily I&O's Summary      GENERAL:  Appears stated age,  HEENT:  NC/AT,  conjunctivae clear and pink,sclera -anicteric  CHEST:  Full & symmetric excursion, no increased effort, breath sounds clear  HEART:  Regular rhythm, S1, S2, no murmur  ABDOMEN:  Soft, non-tender, non-distended, normoactive bowel sounds,   EXTEREMITIES:  no edema  SKIN:  No rash/warm/dry  NEURO:  Alert, oriented,       LABS:                        12.3   17.94 )-----------( 196      ( 30 May 2023 07:10 )             37.7     05-30    141  |  106  |  15  ----------------------------<  87  3.6   |  27  |  1.19    Ca    8.4      30 May 2023 07:10  Phos  3.1     05-30  Mg     1.7     05-30    TPro  5.9<L>  /  Alb  2.7<L>  /  TBili  0.6  /  DBili  x   /  AST  28  /  ALT  29  /  AlkPhos  41  05-30    PT/INR - ( 29 May 2023 11:56 )   PT: 12.9 sec;   INR: 1.11 ratio         PTT - ( 29 May 2023 11:56 )  PTT:25.7 sec  Urinalysis Basic - ( 29 May 2023 13:00 )    Color: Yellow / Appearance: Cloudy / S.045 / pH: x  Gluc: x / Ketone: Trace mg/dL  / Bili: Negative / Urobili: 0.2 mg/dL   Blood: x / Protein: 100 mg/dL / Nitrite: Negative   Leuk Esterase: Large / RBC: 4 /HPF / WBC 16 /HPF   Sq Epi: x / Non Sq Epi: x / Bacteria: Occasional /HPF        RADIOLOGY & ADDITIONAL TESTS:    IMPRESSION:  Questionable cystitis. Correlate with urinalysis.    Large, at least 4.3 cm partially thrombosed common hepatic artery   aneurysm.    No evidence of pneumonia.

## 2023-05-30 NOTE — PATIENT PROFILE ADULT - FALL HARM RISK - RISK INTERVENTIONS

## 2023-05-30 NOTE — PROGRESS NOTE ADULT - SUBJECTIVE AND OBJECTIVE BOX
Addendum to H&P/ICU Consult note  - Patient seen and examined today    ICU CONSULT  Location of Patient :  [facility] ( ER)  Chief Complaint :  Sepsis  Reason For consult : sepsi      Initial HPI on admission:  HPI:  83 year old male with a PMH of HTN, HLD  and urinary retention / LUTS who presented to [facility] ED with c/o AMS, malaise, chills and fever x 1 day. Pt noted to be with borderline hypotension despite IVF resuscitation efforts by ED staff and empiric abx coverage. Urine cloudy. Lactate cleared from 2.8-->1.3. ICU team consulted yesterday where given fluids, and BP improved after 4 L  Patient seen and examined feels comfortable no cp, palp, n/v       PAST MEDICAL & SURGICAL HISTORY:  Urinary retention  HTN (hypertension)  HLD (hyperlipidemia)  Melanoma  Urinary retention  Enlarged prostate with lower urinary tract symptoms (LUTS)  Yin catheter status  Status post Mohs surgery chest -   H/O colonoscopy  Enlarged prostate surgery done in oct 2020    Allergies    No Known Allergies    Intolerances      FAMILY HISTORY:    Social history:      Smoking: Few cig in teens but not smoked since age 21     Drinking:     Drug use:    Review of Systems: as stated above    CONSTITUTIONAL: +fever, No chills, +fatigue  EYES: No eye pain, No visual disturbances, No discharge  ENMT:  No difficulty hearing, No tinnitus, No vertigo; No sinus or throat pain  NECK: No pain, No stiffness  RESPIRATORY: No Cough, No SOB, No Secretions  CARDIOVASCULAR: No chest pain, No palpitations, No dizziness, or No leg swelling  GASTROINTESTINAL: No abdominal or epigastric pain. No nausea, No vomiting, No hematemesis; No diarrhea, No constipation. No melena, No hematochezia.  GENITOURINARY: No dysuria, No frequency, No hematuria, No incontinence  NEUROLOGICAL: No headaches, No memory loss, No loss of strength, No numbness, No tremors  SKIN: No itching, No burning, No rashes, No lesions   MUSCULOSKELETAL: No joint pain or swelling; No muscle, back, No extremity pain  PSYCHIATRIC: No depression, No anxiety, No mood swings, No difficulty sleeping      Medications:  MEDICATIONS  (STANDING):  atorvastatin 20 milliGRAM(s) Oral at bedtime  cefTRIAXone   IVPB 1000 milliGRAM(s) IV Intermittent every 24 hours  chlorhexidine 2% Cloths 1 Application(s) Topical <User Schedule>  heparin   Injectable 5000 Unit(s) SubCutaneous every 8 hours    MEDICATIONS  (PRN):      Antibiotics History  cefepime   IVPB 1000 milliGRAM(s) IV Intermittent once, 23 @ 12:12, Stop order after: 1 Doses  cefTRIAXone   IVPB 1000 milliGRAM(s) IV Intermittent every 24 hours, 23 @ 00:00, Stop order after: 7 Days  vancomycin  IVPB. 1000 milliGRAM(s) IV Intermittent once, 23 @ 12:12, Stop order after: 1 Doses      Heme Medications   heparin   Injectable 5000 Unit(s) SubCutaneous every 8 hours, 23 @ 18:37      GI Medications        Home Medications:  Last Order Reconciliation Date: 23 (Admission Reconciliation)  CoQ10 300 mg oral capsule: 1 cap(s) orally once a day (21)  lisinopril-hydrochlorothiazide 20 mg-12.5 mg oral tablet: 1 tab(s) orally once a day (21)  Multiple Vitamins oral tablet: 1 tab(s) orally once a day (21)  Turmeric 500 mg oral capsule: 1 cap(s) orally once a day (21)  Zocor 40 mg oral tablet: 1 tab(s) orally once a day (21)        LABS:                        12.3   17.94 )-----------( 196      ( 30 May 2023 07:10 )             37.7     05-30    141  |  106  |  15  ----------------------------<  87  3.6   |  27  |  1.19    Ca    8.4      30 May 2023 07:10  Phos  3.1     05-  Mg     1.7     -30    TPro  5.9<L>  /  Alb  2.7<L>  /  TBili  0.6  /  DBili  x   /  AST  28  /  ALT  29  /  AlkPhos  41  30    Urinalysis Basic - ( 29 May 2023 13:00 )    Color: Yellow / Appearance: Cloudy / S.045 / pH: x / Gluc: x / Ketone: Trace mg/dL  / Bili: Negative / Urobili: 0.2 mg/dL / Blood: x / Protein: 100 mg/dL / Nitrite: Negative   Leuk Esterase: Large / RBC: 4 /HPF / WBC 16 /HPF / Sq Epi: x / Non Sq Epi: x / Bacteria: Occasional /HPF    COVID  23 @ 12:30  COVID -   NotDetec        Trend Cardiac Enzymes  23 @ 11:56  FCR-LBPSL-DKITY-CPKMM/Trop I - -- - --  - --  -  --  /  6.4       WBC Trend  23 @ 07:10   -  17.94<H>  23 @ 11:56   -  7.32    H/H Trend  23 @ 07:10   -   12.3<L>/ 37.7<L>  23 @ 11:56   -   14.6/ 44.4    Stool Occult Blood    Platelet Trend  23 @ 07:10   -  196  23 @ 11:56   -  214    Trend Sodium  23 @ 07:10   -  141  23 @ 11:56   -  136    Trend Potassium  23 @ 07:10   -  3.6  23 @ 11:56   -  3.5    Trend Bun/Cr  23 @ 07:10  BUN/CR -  15 / 1.19  23 @ 11:56  BUN/CR -  20 / 1.24    Lactic Acid Trend  23 @ 13:23   -   1.3  23 @ 11:56   -   2.8<H>    ABG Trend    Trend AST/ALT/ALK Phos/Bili  23 @ 07:10   28/29/41/0.6  23 @ 11:56   41<H>/39/56/0.9      Ammonia Trend      Amylase / Lipase Trend      Albumin Trend  23 @ 07:10   -   2.7<L>  23 @ 11:56   -   3.6      PTT - PT - INR Trend  23 @ 11:56   -   25.7<L> - 12.9 - 1.11    Glucose Trend  23 @ 07:10   -  -- 87 --  23 @ 11:56   -  -- 94 --        RADIOLOGY  CXR:      CT:  < from: CT Chest w/ IV Cont (23 @ 13:35) >  ACC: [id number] EXAM:  CT CHEST IC   ORDERED BY: MIGUELRADHA LOVELACE     PROCEDURE DATE:  2023          INTERPRETATION:  CLINICAL INFORMATION: Abdominal pain and fever.    COMPARISON: None.    CONTRAST/COMPLICATIONS:  IV Contrast: Omnipaque [id number] cc administered   10 cc discarded  Oral Contrast: NONE  Complications: None reported at time of study completion    PROCEDURE:  CT of the Chest, Abdomen and Pelvis was performed.  Sagittal and coronal reformats were performed.    FINDINGS:    CHEST:  LUNGS AND LARGE AIRWAYS: Patent central airways. Mild apical scarring.   Minimal linear atelectasis in the lingula and left lower lobe. No   evidence of pneumonia. No pulmonary nodules.  PLEURA: No pleural effusion.  VESSELS: Atherosclerotic changes of the aorta and coronary arteries.  HEART: Heart size is normal. No pericardial effusion.  MEDIASTINUM AND ROSA: No lymphadenopathy.  CHEST WALL AND LOWER NECK: Within normal limits.    ABDOMEN AND PELVIS:  LIVER: Normal in size and morphology with small hypodense nodule in the   left lobe that is too small to characterize.  BILE DUCTS: Normal caliber.  GALLBLADDER: Within normal limits.  SPLEEN: Within normal limits.  PANCREAS: Within normal limits.  ADRENALS: Within normal limits.  KIDNEYS/URETERS: Lower pole renal cyst and subcentimeter left mid renal nodule that is too small to characterize but likely to represent a cyst. Mild right upper pole renal cortical scarring. No hydronephrosis.    BLADDER: Although not distended, the bladder wall may be slightly   thickened with perivesical inflammation. Correlate for cystitis.  REPRODUCTIVE ORGANS: Prostate is not enlarged.    BOWEL: Duodenal and colonic diverticulosis without evidence of acute diverticulitis.. No bowel obstruction.Appendix is normal.  PERITONEUM: No ascites or pneumoperitoneum.  VESSELS: Atherosclerotic changes. There is a large partially thrombosed aneurysm of the common hepatic artery measuring at least 4.3 cm. Hepatic and portal veins and SMV are patent.  RETROPERITONEUM/LYMPH NODES: No lymphadenopathy.  ABDOMINAL WALL: Within normal limits.  BONES: Degenerative changes.    IMPRESSION:  Questionable cystitis. Correlate with urinalysis.    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    No evidence of pneumonia.    --- End of Report ---      < end of copied text >    ECHO:      VITALS:  T(C): 36.7 (23 @ 09:30), Max: 36.9 (23 @ 05:09)  T(F): 98.1 (23 @ 09:30), Max: 98.5 (23 @ 05:09)  HR: 68 (23 @ 09:30) (67 - 78)  BP: 93/62 (23 @ 09:30) (89/58 - 108/64)  BP(mean): 77 (23 @ 22:53) (69 - 77)  ABP: --  ABP(mean): --  RR: 16 (23 @ 09:30) (15 - 21)  SpO2: 98% (23 @ 09:30) (98% - 99%)  CVP(mm Hg): --  CVP(cm H2O): --    Ins and Outs       Height (cm): 188 (23 @ 11:45)  Weight (kg): 79.832 (23 @ 11:58)  BMI (kg/m2): 22.6 (23 @ 11:58)        I&O's Detail      Physical Examination:  GENERAL:               Alert, Oriented, No acute distress.    HEENT:                    Pupils equal, reactive to light.  Symmetric. No JVD, Moist MM  PULM:                     Bilateral air entry, Clear to auscultation bilaterally, no significant sputum production, No Rales, No Rhonchi, No Wheezing  CVS:                         S1, S2,  No Murmur  ABD:                        Soft, nondistended, nontender, normoactive bowel sounds,   EXT:                         No edema, nontender, No Cyanosis or Clubbing   Vascular:                Warm Extremities, Normal Capillary refill, Normal Distal Pulses  NEURO:                  Alert, oriented, interactive, nonfocal, follows commands  PSYC:                      Calm, + Insight.

## 2023-05-30 NOTE — CONSULT NOTE ADULT - SUBJECTIVE AND OBJECTIVE BOX
GENERAL SURGERY CONSULT NOTE    Patient is a 83y old  Male who presents with a chief complaint of UTI, Severe Sepsis (30 May 2023 15:42)      HPI:  Mr. Garces is an 83 year old male with a PMH of HTN, HLD  and urinary retention / LUTS who presented to [facility] ED with c/o AMS, malaise, chills and fever x 1 day. Pt noted to be with borderline hypotension despite IVF resuscitation efforts by ED staff and empiric abx coverage. Urine cloudy. Lactate cleared from 2.8-->1.3. ICU team consulted.  (29 May 2023 17:44)    Vascular surgery consulted for incidental finding of common hepatic artery aneurysm thrombosis noted on CTAP today. Pt reports no complaints, feels well. Of note, pt reports having anterior abdominal wall hernia x40 years and does not want any surgical intervention for the hernia. Denies fever, chills, chest pain, SOB, abdominal pain, N/V, hematochezia, melena.      PAST MEDICAL & SURGICAL HISTORY:  Urinary retention      HTN (hypertension)      HLD (hyperlipidemia)      Melanoma      Urinary retention      Enlarged prostate with lower urinary tract symptoms (LUTS)      Yin catheter status      Status post Mohs surgery  chest -       H/O colonoscopy      Enlarged prostate  surgery done in oct 2020          Review of Systems:  Contained within HPI.    MEDICATIONS  (STANDING):  atorvastatin 20 milliGRAM(s) Oral at bedtime  cefTRIAXone   IVPB 1000 milliGRAM(s) IV Intermittent every 24 hours  chlorhexidine 2% Cloths 1 Application(s) Topical <User Schedule>  heparin   Injectable 5000 Unit(s) SubCutaneous every 8 hours    MEDICATIONS  (PRN):      Allergies    No Known Allergies    Intolerances        SOCIAL HISTORY       FAMILY HISTORY:      Vital Signs Last 24 Hrs  T(C): 36.7 (30 May 2023 09:30), Max: 36.9 (30 May 2023 05:09)  T(F): 98.1 (30 May 2023 09:30), Max: 98.5 (30 May 2023 05:09)  HR: 68 (30 May 2023 09:30) (67 - 78)  BP: 93/62 (30 May 2023 09:30) (89/58 - 108/64)  BP(mean): 77 (29 May 2023 22:53) (68 - 77)  RR: 16 (30 May 2023 09:30) (15 - 21)  SpO2: 98% (30 May 2023 09:30) (98% - 99%)    Parameters below as of 30 May 2023 09:30  Patient On (Oxygen Delivery Method): room air        Physical Exam:  General: awake and alert, appears stated age, no distress  Eyes : ZULAY, EOMs intact  HENT:  WNL, no JVD  Chest: non-labored breathing bilaterally  Cardiovascular: normal HR  Abdomen: soft, non-tender, non-distended  Extremities: moves all extremities spontaneously  Skin: No rash noted on exposed surfaces examined  Musculoskeletal: normal strength  Neuro/Psych:  Alert, oriented to time, place and person       LABS:                        12.3   17.94 )-----------( 196      ( 30 May 2023 07:10 )             37.7     05-30    141  |  106  |  15  ----------------------------<  87  3.6   |  27  |  1.19    Ca    8.4      30 May 2023 07:10  Phos  3.1       Mg     1.7     -30    TPro  5.9<L>  /  Alb  2.7<L>  /  TBili  0.6  /  DBili  x   /  AST  28  /  ALT  29  /  AlkPhos  41  05-30    PT/INR - ( 29 May 2023 11:56 )   PT: 12.9 sec;   INR: 1.11 ratio         PTT - ( 29 May 2023 11:56 )  PTT:25.7 sec  Urinalysis Basic - ( 29 May 2023 13:00 )    Color: Yellow / Appearance: Cloudy / S.045 / pH: x  Gluc: x / Ketone: Trace mg/dL  / Bili: Negative / Urobili: 0.2 mg/dL   Blood: x / Protein: 100 mg/dL / Nitrite: Negative   Leuk Esterase: Large / RBC: 4 /HPF / WBC 16 /HPF   Sq Epi: x / Non Sq Epi: x / Bacteria: Occasional /HPF        RADIOLOGY & ADDITIONAL STUDIES:  < from: CT Abdomen and Pelvis w/ IV Cont (23 @ 13:36) >  PROCEDURE DATE:  2023      INTERPRETATION:  CLINICAL INFORMATION: Abdominal pain and fever.    COMPARISON: None.    CONTRAST/COMPLICATIONS:  IV Contrast:Omnipaque 300  90 cc administered   10 cc discarded  Oral Contrast: NONE  Complications: None reported at time of study completion    PROCEDURE:  CT of the Chest, Abdomen and Pelvis was performed.  Sagittal and coronal reformats were performed.    FINDINGS:    CHEST:  LUNGS AND LARGE AIRWAYS: Patent central airways. Mild apical scarring.   Minimal linear atelectasis in the lingula and left lower lobe. No   evidence of pneumonia. No pulmonary nodules.  PLEURA: No pleural effusion.  VESSELS: Atherosclerotic changes of the aorta and coronary arteries.  HEART: Heart size is normal. No pericardial effusion.  MEDIASTINUM AND ROSA: No lymphadenopathy.  CHEST WALL AND LOWER NECK: Within normal limits.    ABDOMEN AND PELVIS:  LIVER: Normal in size andmorphology with small hypodense nodule in the   left lobe that is too small to characterize.  BILE DUCTS: Normal caliber.  GALLBLADDER: Within normal limits.  SPLEEN: Within normal limits.  PANCREAS: Within normal limits.  ADRENALS: Within normal limits.  KIDNEYS/URETERS: Lower pole renal cyst and subcentimeter left mid renal   nodule that is too small to characterize but likely to represent a cyst.   Mild right upper pole renal cortical scarring. No hydronephrosis.    BLADDER: Although not distended, the bladder wall may be slightly   thickened with perivesical inflammation. Correlate for cystitis.  REPRODUCTIVE ORGANS: Prostate is not enlarged.    BOWEL: Duodenal and colonic diverticulosis without evidence of acute   diverticulitis.. No bowel obstruction. Appendix is normal.  PERITONEUM: No ascites or pneumoperitoneum.  VESSELS: Atherosclerotic changes. There is a large partially thrombosed   aneurysm of the common hepatic artery measuring at least 4.3 cm. Hepatic   and portal veins andSMV are patent.  RETROPERITONEUM/LYMPH NODES: No lymphadenopathy.  ABDOMINAL WALL: Within normal limits.  BONES: Degenerative changes.    IMPRESSION:  Questionable cystitis. Correlate with urinalysis.    Large, at least 4.3 cm partially thrombosed common hepatic artery   aneurysm.    No evidence of pneumonia.    --- End of Report ---

## 2023-05-30 NOTE — PROGRESS NOTE ADULT - ASSESSMENT
Assessment  Severe sepsis due to UTI   required fluids, did not require pressors as maintained MAP > 65  underlying HTN, HLD  and urinary retention    Plan  Continue care on medical floor  Continue IVF  IV antibiotics  F/u cultures  monitor for retention.   reconsult ICU as needed

## 2023-05-30 NOTE — CONSULT NOTE ADULT - ASSESSMENT
GI consulted to see patient due to incidental CT findings. Patient seen and examined. He originally presented to hospital due to weakness and chills, found to have 104 temp and UTI. 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 Patient denies abdominal pain, nausea, vomiting. No hematemesis, coffee ground emesis, BRBPR or melena. Last colonoscopy was a few years ago and reportedly normal. Patient does have first degree relative with colon cancer.

## 2023-05-30 NOTE — PATIENT PROFILE ADULT - NSPROHMSYMPCOND_GEN_A_NUR
Occupational Therapy  Facility/Department: [facility]  Occupational Therapy Initial Assessment    Name: Giovana Conn  : 1984  MRN: [id number]  Date of Service: 2022    Discharge Recommendations:  Home with assist PRN  OT Equipment Recommendations  Equipment Needed: Yes  Mobility Devices: ADL Assistive Devices  ADL Assistive Devices: Toileting - Raised Toilet Seat with arms  Giovana Conn scored a 22/24 on the AM-PAC ADL Inpatient form. At this time, no further OT is recommended upon discharge due to pt nearing baseline function. Recommend patient returns to prior setting with prior services. Patient Diagnosis(es): The primary encounter diagnosis was Chest pain, unspecified type. Diagnoses of Shortness of breath, Tachycardia, and Cardiomyopathy, unspecified type [facility]) were also pertinent to this visit. Past Medical History:  has a past medical history of COVID-19, History of blood transfusion, Hyperlipidemia, and Neuropathy. Past Surgical History:  has a past surgical history that includes bronchoscopy (2022); bronchoscopy (2022); bronchoscopy (2022); and bronchoscopy (N/A, 2022). Treatment Diagnosis: Decreased: ADLs/IADLs, endurance      Assessment   Performance deficits / Impairments: Decreased functional mobility ; Decreased ADL status; Decreased high-level IADLs;Decreased endurance;Decreased strength  Assessment: Pt is a 29 yo M admitted with chest pain and SOB. PTA, pt lives in an apt w/ his 2 kids where he is typically independent in self-care and fxl mobility using SPC prn. He reports still completing most IADLs and driving, though has been getting weaker in past few months d/t illnesses and recent ankle fracture. He is close to his baseline today, but does endorse weakness and fatigue. He completed fxl transfers, fxl mobility, and ADLs w/ supervision. Pt expressed interest in ARU but did discuss w/ him that he will likely not qualify.  Anticipate pt will be safe to return home w/ assist PRN at d/c. Recommend RTS w/ rails to increase his safety and independence in ADLs. Will continue to follow while hospitalized for strengthening/endurance purposes. Treatment Diagnosis: Decreased: ADLs/IADLs, endurance  Prognosis: Good  Decision Making: Low Complexity  REQUIRES OT FOLLOW-UP: Yes  Activity Tolerance  Activity Tolerance: Patient Tolerated treatment well;Patient limited by fatigue        Plan   Occupational Therapy Plan  Times Per Week: 2-3  Times Per Day: Once a day  Current Treatment Recommendations: Strengthening, ROM, Balance training, Functional mobility training, Endurance training, Self-Care / ADL, Safety education & training     Restrictions  Restrictions/Precautions  Restrictions/Precautions: Up Ad Joanne    Subjective   General  Chart Reviewed: Yes  Patient assessed for rehabilitation services?: Yes  Additional Pertinent Hx: per H&P: \"Patient is a 68-year-old male who presents to the hospital due to chest pain. According to patient he has been having chest pain especially with exertion, he also had shortness of breath as well as chills. He mentions his chest pain radiated across his chest felt heavy and constant it was there for around 30 minutes. \"  Family / Caregiver Present: No  Referring Practitioner: Jermaine  Diagnosis: Chest pain  Subjective  Subjective: Pt met b/s for OT eval/tx w/ PT. Pt sitting EOB, agreeable to therapy.  Pt denied pain     Social/Functional History  Social/Functional History  Lives With: Family (2 kids)  Type of Home: Apartment  Home Layout: One level  Home Access: Stairs to enter with rails  Bathroom Shower/Tub: Tub/Shower unit, Shower chair with back (TTB)  Bathroom Toilet: Standard (Holds onto cabinet next to toilet)  Bathroom Equipment: Tub transfer bench  Home Equipment: Sell My Timeshare NOW  Has the patient had two or more falls in the past year or any fall with injury in the past year?: Yes (Fell 3-4 months ago resulting in ankle fracture)  ADL Assistance: Independent  Homemaking Assistance: Independent (uses electric scooter at the grocery store)  Ambulation Assistance: Independent  Transfer Assistance: Independent  Active : Yes (only when [facility]")  Additional Comments: Has been doing OP PT recently d/t weakness       Objective   Safety Devices  Type of Devices: Call light within reach;Gait belt;Left in chair     Toilet Transfers  Toilet - Technique: Ambulating  Equipment Used: Grab bars  Toilet Transfer: Supervision  AROM: Within functional limits  PROM: Within functional limits  Strength: Generally decreased, functional  Coordination: Within functional limits  ADL  UE Dressing: Independent  UE Dressing Skilled Clinical Factors: Pt donned hospital gown  LE Dressing: Supervision  LE Dressing Skilled Clinical Factors: Pt doffed/donned socks seated in recliner, slight increased effort  Additional Comments: Anticipate pt would be independent w/ most ADLs, supervision for bathing based on ROM, strength, endurance this session        Bed mobility  Supine to Sit: Unable to assess (EOB at start of session)  Sit to Supine: Unable to assess (in recliner at end of session)  Transfers  Sit to stand: Supervision  Stand to sit: Supervision  Transfer Comments: Pt completed fxl mobility to/from BR w/ supervision using SPC; no LOB noted but did appear fatigued  Vision  Vision: Within Functional Limits  Hearing  Hearing: Within functional limits  Cognition  Overall Cognitive Status: [facility]  Cognition Comment: Tearful at times regarding weakness  Orientation  Overall Orientation Status: Within Functional Limits                  Education Given To: Patient  Education Provided: Role of Therapy;Transfer Training;Energy Conservation  Education Method: Verbal  Barriers to Learning: None  Education Outcome: Verbalized understanding              AM-PAC Score  AM-PAC Inpatient Daily Activity Raw Score: 22 (11/14/22 6531)  AM-PAC Inpatient ADL T-Scale Score : 47.1 (11/14/22 0831)  ADL Inpatient CMS 0-100% Score: 25.8 (11/14/22 0831)  ADL Inpatient CMS G-Code Modifier : CJ (11/14/22 0831)    Goals  Short Term Goals  Time Frame for Short Term Goals: Prior to d/c  Short Term Goal 1: Pt will toilet mod I  Short Term Goal 2: Pt will bathe mod I  Short Term Goal 3: Pt will tolerate standing x8 mins mod I during ADL task  Short Term Goal 4: Pt will complete B UE HEP 10 reps in all planes to increase his strength/endurance  Long Term Goals  Time Frame for Long Term Goals : LTG=STG  Patient Goals   Patient goals :  To get stronger       Therapy Time   Individual Concurrent Group Co-treatment   Time In 0800         Time Out 0830         Minutes 30         Timed Code Treatment Minutes: Rebeca, [facility] none

## 2023-05-31 ENCOUNTER — TRANSCRIPTION ENCOUNTER (OUTPATIENT)
Age: 83
End: 2023-05-31

## 2023-05-31 VITALS
OXYGEN SATURATION: 95 % | TEMPERATURE: 98 F | HEART RATE: 70 BPM | SYSTOLIC BLOOD PRESSURE: 130 MMHG | RESPIRATION RATE: 18 BRPM | DIASTOLIC BLOOD PRESSURE: 78 MMHG

## 2023-05-31 PROCEDURE — 85027 COMPLETE CBC AUTOMATED: CPT

## 2023-05-31 PROCEDURE — 84100 ASSAY OF PHOSPHORUS: CPT

## 2023-05-31 PROCEDURE — 71260 CT THORAX DX C+: CPT | Mod: MA

## 2023-05-31 PROCEDURE — 85025 COMPLETE CBC W/AUTO DIFF WBC: CPT

## 2023-05-31 PROCEDURE — 84484 ASSAY OF TROPONIN QUANT: CPT

## 2023-05-31 PROCEDURE — 96367 TX/PROPH/DG ADDL SEQ IV INF: CPT

## 2023-05-31 PROCEDURE — 85730 THROMBOPLASTIN TIME PARTIAL: CPT

## 2023-05-31 PROCEDURE — 85610 PROTHROMBIN TIME: CPT

## 2023-05-31 PROCEDURE — 87186 SC STD MICRODIL/AGAR DIL: CPT

## 2023-05-31 PROCEDURE — 93005 ELECTROCARDIOGRAM TRACING: CPT

## 2023-05-31 PROCEDURE — 74177 CT ABD & PELVIS W/CONTRAST: CPT | Mod: MA

## 2023-05-31 PROCEDURE — 80053 COMPREHEN METABOLIC PANEL: CPT

## 2023-05-31 PROCEDURE — 96365 THER/PROPH/DIAG IV INF INIT: CPT

## 2023-05-31 PROCEDURE — 74174 CTA ABD&PLVS W/CONTRAST: CPT

## 2023-05-31 PROCEDURE — 36415 COLL VENOUS BLD VENIPUNCTURE: CPT

## 2023-05-31 PROCEDURE — 81001 URINALYSIS AUTO W/SCOPE: CPT

## 2023-05-31 PROCEDURE — 0225U NFCT DS DNA&RNA 21 SARSCOV2: CPT

## 2023-05-31 PROCEDURE — 87077 CULTURE AEROBIC IDENTIFY: CPT

## 2023-05-31 PROCEDURE — 83605 ASSAY OF LACTIC ACID: CPT

## 2023-05-31 PROCEDURE — 83735 ASSAY OF MAGNESIUM: CPT

## 2023-05-31 PROCEDURE — 99239 HOSP IP/OBS DSCHRG MGMT >30: CPT

## 2023-05-31 PROCEDURE — 87086 URINE CULTURE/COLONY COUNT: CPT

## 2023-05-31 PROCEDURE — 99233 SBSQ HOSP IP/OBS HIGH 50: CPT

## 2023-05-31 PROCEDURE — 99285 EMERGENCY DEPT VISIT HI MDM: CPT

## 2023-05-31 PROCEDURE — 74174 CTA ABD&PLVS W/CONTRAST: CPT | Mod: 26

## 2023-05-31 PROCEDURE — 87040 BLOOD CULTURE FOR BACTERIA: CPT

## 2023-05-31 RX ORDER — CEFUROXIME AXETIL 250 MG
1 TABLET ORAL
Qty: 6 | Refills: 0
Start: 2023-05-31 | End: 2023-06-02

## 2023-05-31 RX ADMIN — HEPARIN SODIUM 5000 UNIT(S): 5000 INJECTION INTRAVENOUS; SUBCUTANEOUS at 05:40

## 2023-05-31 RX ADMIN — CEFTRIAXONE 100 MILLIGRAM(S): 500 INJECTION, POWDER, FOR SOLUTION INTRAMUSCULAR; INTRAVENOUS at 05:40

## 2023-05-31 RX ADMIN — CHLORHEXIDINE GLUCONATE 1 APPLICATION(S): 213 SOLUTION TOPICAL at 06:43

## 2023-05-31 NOTE — DISCHARGE NOTE NURSING/CASE MANAGEMENT/SOCIAL WORK - PATIENT PORTAL LINK FT
You can access the FollowMyHealth Patient Portal offered by [facility] by registering at the following website: [email or web address]. By joining [facility]’s FollowMyHealth portal, you will also be able to view your health information using other applications (apps) compatible with our system.

## 2023-05-31 NOTE — DISCHARGE NOTE PROVIDER - HOSPITAL COURSE
Hospital Course  HPI:  Mr. Garces is an 83 year old male with a PMH of HTN, HLD  and urinary retention / LUTS who presented to [facility] ED with c/o AMS, malaise, chills and fever x 1 day. Pt noted to be with borderline hypotension despite IVF resuscitation efforts by ED staff and empiric abx coverage. Urine cloudy. Lactate cleared from 2.8-->1.3. ICU team consulted.  (29 May 2023 17:44)    You were admitted for fever, chills suspected of having urinary tract infection.      You were treated with IV antibiotics with improvement of your symptoms.      You were prescribed the following new medications:    Ceftin 250 mg BID for 3 more days    You will need to follow up with your primary care physician.    Source of Infection: Urinary Tract Infection   Antibiotic / Last Day: Ceftin 250 mg BID - last day 6/3/2023    Discharging Provider:  Kishan Wang MD  Contact Info: Cell [phone] - Please call with any questions or concerns.    Outpatient Provider: Hospital Course  HPI:  Mr. Garces is an 83 year old male with a PMH of HTN, HLD  and urinary retention / LUTS who presented to [facility] ED with c/o AMS, malaise, chills and fever x 1 day. Pt noted to be with borderline hypotension despite IVF resuscitation efforts by ED staff and empiric abx coverage. Urine cloudy. Lactate cleared from 2.8-->1.3. ICU team consulted.  (29 May 2023 17:44)    You were admitted for fever, chills suspected of having urinary tract infection.      You were treated with IV antibiotics with improvement of your symptoms.      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  Vascular surgery recommended eliquis and to follow up outpatient.  After discussion you were hesitant to start anticoagulation so we recommended that you discuss with your primary care physician Dr. Manuel.     Please also follow up with Dr. Mackey (Vascular surgery) as an outpatient.    You were prescribed the following new medications:    Ceftin 250 mg BID for 3 more days    You will need to follow up with your primary care physician.    Source of Infection: Urinary Tract Infection   Antibiotic / Last Day: Ceftin 250 mg BID - last day 6/3/2023    Discharging Provider:  Kishan Wang MD  Contact Info: Cell [phone] - Please call with any questions or concerns.    Outpatient Provider:

## 2023-05-31 NOTE — DISCHARGE NOTE NURSING/CASE MANAGEMENT/SOCIAL WORK - NSDCFUADDAPPT_GEN_ALL_CORE_FT
We made you a hospital follow-up appointment with Dr. Manuel on 6/7/23 at 12:30pm, office #[phone].

## 2023-05-31 NOTE — PROGRESS NOTE ADULT - NS ATTEND AMEND GEN_ALL_CORE FT
Agree with above,  no evidence of ischemic hepatopathy.    Follow vascular recs    Please reconsult if we can be of service

## 2023-05-31 NOTE — CHART NOTE - NSCHARTNOTEFT_GEN_A_CORE
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  Patent SMA and RACHEL    Assessment: 83M here with UTI. Vascular surgery consulted for incidental finding of common hepatic artery aneurysm thrombosis. CTA reviewed with Dr. Mackey, addendum to consult recommendation based on CTA findings are below.    Plan:  - No acute vascular intervention  - Recommend Eliquis  - Follow up outpatient in 2 weeks w/ Dr. Mackey  - Medical management per primary team    Discussed w/  Dr. 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  Patent SMA and RACHEL    Assessment: 83M here with UTI. Vascular surgery consulted for incidental finding of common hepatic artery aneurysm thrombosis. CTA reviewed with Dr. Mackey, addendum to consult recommendation based on CTA findings are below.    Plan:  - No acute vascular intervention during this admission. Will likely need outpatient planning.   - Recommend Eliquis due to partial thrombosis. Hopefully improves and will be able to keep patency of GDA-hepatic and consider stenting. If not, embolization will the option.  - Follow up outpatient in 2 weeks w/ Dr. Mackey  - Medical management per primary team    Discussed w/  Dr. Mackey  Vascular

## 2023-05-31 NOTE — DISCHARGE NOTE PROVIDER - NSDCFUSCHEDAPPT_GEN_ALL_CORE_FT
Marv Mar  [facility] Physician [facility]  CARDIOLOGY [address]  Scheduled Appointment: 07/25/2023     Ryanne Weaver  [facility] Physician [facility]  [facility]  Scheduled Appointment: 06/07/2023    Marv Mar  [facility]  CARDIOLOGY [address]  Scheduled Appointment: 07/25/2023

## 2023-05-31 NOTE — CONSULT NOTE ADULT - SUBJECTIVE AND OBJECTIVE BOX
HPI:   Patient is a 83y male with    REVIEW OF SYSTEMS:  All other review of systems negative (Comprehensive ROS)    PAST MEDICAL & SURGICAL HISTORY:  Urinary retention      HTN (hypertension)      HLD (hyperlipidemia)      Melanoma      Urinary retention      Enlarged prostate with lower urinary tract symptoms (LUTS)      Yin catheter status      Status post Mohs surgery  chest -       H/O colonoscopy      Enlarged prostate  surgery done in oct 2020          Allergies    No Known Allergies    Intolerances        Antimicrobials Day #    cefTRIAXone   IVPB 1000 milliGRAM(s) IV Intermittent every 24 hours    Other Medications:  atorvastatin 20 milliGRAM(s) Oral at bedtime  chlorhexidine 2% Cloths 1 Application(s) Topical <User Schedule>  heparin   Injectable 5000 Unit(s) SubCutaneous every 8 hours      FAMILY HISTORY:      SOCIAL HISTORY:  Smoking:     ETOH:     Drug Use:     Single     T(F): 97.6 (23 @ 05:06), Max: 99.4 (23 @ 20:51)  HR: 66 (23 @ 05:06)  BP: 113/72 (23 @ 05:06)  RR: 16 (23 @ 05:06)  SpO2: 97% (23 @ 05:06)  Wt(kg): --    PHYSICAL EXAM:  General: alert, no acute distress  Eyes:  anicteric, no conjunctival injection, no discharge  Oropharynx: no lesions or injection 	  Neck: supple, without adenopathy  Lungs: clear to auscultation  Heart: regular rate and rhythm; no murmur, rubs or gallops  Abdomen: soft, nondistended, nontender, without mass or organomegaly  Skin: no lesions  Extremities: no clubbing, cyanosis, or edema  Neurologic: alert, oriented, moves all extremities    LAB RESULTS:                        12.3   17.94 )-----------( 196      ( 30 May 2023 07:10 )             37.7     05-30    141  |  106  |  15  ----------------------------<  87  3.6   |  27  |  1.19    Ca    8.4      30 May 2023 07:10  Phos  3.1     05-30  Mg     1.7     05-30    TPro  5.9<L>  /  Alb  2.7<L>  /  TBili  0.6  /  DBili  x   /  AST  28  /  ALT  29  /  AlkPhos  41  05-30    LIVER FUNCTIONS - ( 30 May 2023 07:10 )  Alb: 2.7 g/dL / Pro: 5.9 g/dL / ALK PHOS: 41 U/L / ALT: 29 U/L / AST: 28 U/L / GGT: x           Urinalysis Basic - ( 29 May 2023 13:00 )    Color: Yellow / Appearance: Cloudy / S.045 / pH: x  Gluc: x / Ketone: Trace mg/dL  / Bili: Negative / Urobili: 0.2 mg/dL   Blood: x / Protein: 100 mg/dL / Nitrite: Negative   Leuk Esterase: Large / RBC: 4 /HPF / WBC 16 /HPF   Sq Epi: x / Non Sq Epi: x / Bacteria: Occasional /HPF        MICROBIOLOGY REVIEWED:    Culture - Urine (23 @ 11:56)   Specimen Source: Clean Catch Clean Catch (Midstream)  Culture Results:   10,000 - 49,000 CFU/mL Enterococcus faecalisRADIOLOGY REVIEWED:   HPI:   Patient is a 83y male with d male with a PMH of HTN, HLD  and urinary retention / LUTS who presented to [facility] ED with c/o AMS, malaise, chills and fever x 1 day. Pt noted to be with borderline hypotension despite IVF resuscitation efforts by ED staff and empiric abx coverage. Urine cloudy. He was reported  to have 104 temp and UTI. Also with incidental finding on CT of 4.3 cm partially thrombosed common hepatic artery aneurysm. He denies abdominal pain, nausea, vomiting. His urine cult showed enterococcus, but his clinical symptoms improved on CTX. He denies any recent ticks or other insect bites, but states [place] in [place] about 2 weeks ago, but doesnt recall any specific tick exposure at that time. he denies any HA or rash at present   REVIEW OF SYSTEMS:  All other review of systems negative (Comprehensive ROS)    PAST MEDICAL & SURGICAL HISTORY:  Urinary retention      HTN (hypertension)      HLD (hyperlipidemia)      Melanoma      Urinary retention      Enlarged prostate with lower urinary tract symptoms (LUTS)      Yin catheter status      Status post Mohs surgery  chest -       H/O colonoscopy      Enlarged prostate  surgery done in oct 2020          Allergies    No Known Allergies    Intolerances        Antimicrobials Day #    cefTRIAXone   IVPB 1000 milliGRAM(s) IV Intermittent every 24 hours    Other Medications:  atorvastatin 20 milliGRAM(s) Oral at bedtime  chlorhexidine 2% Cloths 1 Application(s) Topical <User Schedule>  heparin   Injectable 5000 Unit(s) SubCutaneous every 8 hours      FAMILY HISTORY:      SOCIAL HISTORY:  Smoking:     ETOH:     Drug Use:     Single     T(F): 97.6 (23 @ 05:06), Max: 99.4 (23 @ 20:51)  HR: 66 (23 @ 05:06)  BP: 113/72 (23 @ 05:06)  RR: 16 (23 @ 05:06)  SpO2: 97% (23 @ 05:06)  Wt(kg): --    PHYSICAL EXAM:  General: alert, no acute distress  Eyes:  anicteric, no conjunctival injection, no discharge  Oropharynx: no lesions or injection 	  Neck: supple, without adenopathy  Lungs: clear to auscultation  Heart: regular rate and rhythm; no murmur, rubs or gallops  Abdomen: soft, nondistended, nontender, without mass or organomegaly  Skin: no lesions  Extremities: no clubbing, cyanosis, or edema  Neurologic: alert, oriented, moves all extremities    LAB RESULTS:                        12.3   17.94 )-----------( 196      ( 30 May 2023 07:10 )             37.7     05-30    141  |  106  |  15  ----------------------------<  87  3.6   |  27  |  1.19    Ca    8.4      30 May 2023 07:10  Phos  3.1     05-  Mg     1.7         TPro  5.9<L>  /  Alb  2.7<L>  /  TBili  0.6  /  DBili  x   /  AST  28  /  ALT  29  /  AlkPhos  41  30    LIVER FUNCTIONS - ( 30 May 2023 07:10 )  Alb: 2.7 g/dL / Pro: 5.9 g/dL / ALK PHOS: 41 U/L / ALT: 29 U/L / AST: 28 U/L / GGT: x           Urinalysis Basic - ( 29 May 2023 13:00 )    Color: Yellow / Appearance: Cloudy / S.045 / pH: x  Gluc: x / Ketone: Trace mg/dL  / Bili: Negative / Urobili: 0.2 mg/dL   Blood: x / Protein: 100 mg/dL / Nitrite: Negative   Leuk Esterase: Large / RBC: 4 /HPF / WBC 16 /HPF   Sq Epi: x / Non Sq Epi: x / Bacteria: Occasional /HPF        MICROBIOLOGY REVIEWED:    Culture - Urine (23 @ 11:56)   Specimen Source: Clean Catch Clean Catch (Midstream)  Culture Results:   10,000 - 49,000 CFU/mL Enterococcus faecalis  RADIOLOGY REVIEWED:      < from: CT Angio Abdomen and Pelvis w/ IV Cont (23 @ 11:01) >    IMPRESSION:  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    Patent SMA and RACHEL.    < end of copied text >

## 2023-05-31 NOTE — CONSULT NOTE ADULT - ASSESSMENT
Full note pending 83y male with d male with a PMH of HTN, HLD  and urinary retention / LUTS who presented to [facility] ED with c/o AMS, malaise, chills and fever x 1 day. Pt noted to be with borderline hypotension despite IVF resuscitation efforts by ED staff and empiric abx coverage. Urine cloudy. He was reported  to have 104 temp and UTI. Also with incidental finding on CT of 4.3 cm partially thrombosed common hepatic artery aneurysm. He denies abdominal pain, nausea, vomiting. His urine cult showed enterococcus, but his clinical symptoms improved on CTX. He denies any recent ticks or other insect bites, but states [place] in [place] about 2 [place] ago, but doesnt recall any specific tick exposure at that time. he denies any HA or rash at present   He had significant bandemia 16% and now with leukocytosis 17K  He had no  c/o and his clinical status improved on CTX, but he also received a dose of cefepime and Vanc  Ucx showed enterococcus, but only 10K-49K CFU and Cephalosporins are not active against enterococci    Suggest:  Await further blood and urine culture results  further GI and vascular eval to comment on CT findings  Would cont Vanc and CTX for now  if relapse of fever may need to consider screening for tick borne diseases given ? possible recent exposure

## 2023-05-31 NOTE — PROGRESS NOTE ADULT - ASSESSMENT
83 year old male with a PMH of HTN, HLD  and urinary retention / LUTS initially aceepted to ICU, subsequently downgraded after improvement in hemodynamics s/p 4l IVF.    Urinary Tract Infection with severe sepsis - present on admission  Urinary Retention  - cont rocephin  - urine culture negative (10-49K colonies) so will treat for short duration    Hepatic artery aneurysm  - follow up CT Angio for Abd/Pelvis  - vascular following  - hold anticoagulation for now     Hypertension  Hyperlipidemia  - cont statin    DVT PPX  - HSQ   83 year old male with a PMH of HTN, HLD  and urinary retention / LUTS initially aceepted to ICU, subsequently downgraded after improvement in hemodynamics s/p 4l IVF.    Urinary Tract Infection with severe sepsis - present on admission  Urinary Retention  - cont rocephin  - urine culture negative (10-49K colonies) so will treat for short duration    Hepatic artery aneurysm  - follow up CT Angio for Abd/Pelvis  - vascular following  - hold anticoagulation for now     Hypertension  Hyperlipidemia  - cont statin    DVT PPX  - HSQ    if CT Angio negative will d/c home

## 2023-05-31 NOTE — DISCHARGE NOTE NURSING/CASE MANAGEMENT/SOCIAL WORK - NSDCPEFALRISK_GEN_ALL_CORE
For information on Fall & Injury Prevention, visit: [email or web address] OR  [email or web address] OR  https://www.cdc.gov/steadi/patient.html

## 2023-05-31 NOTE — PROGRESS NOTE ADULT - SUBJECTIVE AND OBJECTIVE BOX
INTERVAL HPI/OVERNIGHT EVENTS:  HPI:    84 y/o male admitted with sepsis/uti. Patient seen and examined. Denies abdominal pain.      MEDICATIONS  (STANDING):  atorvastatin 20 milliGRAM(s) Oral at bedtime  cefTRIAXone   IVPB 1000 milliGRAM(s) IV Intermittent every 24 hours  chlorhexidine 2% Cloths 1 Application(s) Topical <User Schedule>  heparin   Injectable 5000 Unit(s) SubCutaneous every 8 hours    MEDICATIONS  (PRN):      Allergies    No Known Allergies    Intolerances        PAST MEDICAL & SURGICAL HISTORY:  Urinary retention      HTN (hypertension)      HLD (hyperlipidemia)      Melanoma      Urinary retention      Enlarged prostate with lower urinary tract symptoms (LUTS)      Yin catheter status      Status post Mohs surgery  chest -       H/O colonoscopy      Enlarged prostate  surgery done in oct 2020      PHYSICAL EXAM:   Vital Signs:  Vital Signs Last 24 Hrs  T(C): 36.4 (31 May 2023 05:06), Max: 37.4 (30 May 2023 20:51)  T(F): 97.6 (31 May 2023 05:06), Max: 99.4 (30 May 2023 20:51)  HR: 66 (31 May 2023 05:06) (66 - 77)  BP: 113/72 (31 May 2023 05:06) (102/58 - 113/72)  BP(mean): --  RR: 16 (31 May 2023 05:06) (16 - 21)  SpO2: 97% (31 May 2023 05:06) (95% - 97%)    Parameters below as of 31 May 2023 05:06  Patient On (Oxygen Delivery Method): room air      Daily     Daily Weight in k.6 (31 May 2023 05:06)I&O's Summary      GENERAL:  Appears stated age  HEENT:  NC/AT,  conjunctivae clear and pink  CHEST:  Full & symmetric excursion, no increased effort, breath sounds clear  HEART:  Regular rhythm, S1, S2, no murmur  ABDOMEN:  Soft, non-tender, non-distended, normoactive bowel sounds,   EXTEREMITIES:  no edema  SKIN:  No rash/warm/dry  NEURO:  Alert, oriented,      LABS:                        12.3   17.94 )-----------( 196      ( 30 May 2023 07:10 )             37.7     05-30    141  |  106  |  15  ----------------------------<  87  3.6   |  27  |  1.19    Ca    8.4      30 May 2023 07:10  Phos  3.1     05-30  Mg     1.7     05-30    TPro  5.9<L>  /  Alb  2.7<L>  /  TBili  0.6  /  DBili  x   /  AST  28  /  ALT  29  /  AlkPhos  41  05-30      Urinalysis Basic - ( 29 May 2023 13:00 )    Color: Yellow / Appearance: Cloudy / S.045 / pH: x  Gluc: x / Ketone: Trace mg/dL  / Bili: Negative / Urobili: 0.2 mg/dL   Blood: x / Protein: 100 mg/dL / Nitrite: Negative   Leuk Esterase: Large / RBC: 4 /HPF / WBC 16 /HPF   Sq Epi: x / Non Sq Epi: x / Bacteria: Occasional /HPF      amylase   lipase  RADIOLOGY & ADDITIONAL TESTS:

## 2023-05-31 NOTE — DISCHARGE NOTE PROVIDER - NSDCMRMEDTOKEN_GEN_ALL_CORE_FT
cefuroxime 250 mg oral tablet: 1 tab(s) orally 2 times a day  CoQ10 300 mg oral capsule: 1 cap(s) orally once a day  lisinopril-hydrochlorothiazide 20 mg-12.5 mg oral tablet: 1 tab(s) orally once a day  Multiple Vitamins oral tablet: 1 tab(s) orally once a day  Turmeric 500 mg oral capsule: 1 cap(s) orally once a day  Zocor 40 mg oral tablet: 1 tab(s) orally once a day

## 2023-05-31 NOTE — PROGRESS NOTE ADULT - SUBJECTIVE AND OBJECTIVE BOX
Patient is a 83y old  Male who presents with a chief complaint of UTI, Severe Sepsis (30 May 2023 17:14)      Patient seen and examined at bedside. No overnight events reported.     ALLERGIES:  No Known Allergies    MEDICATIONS  (STANDING):  atorvastatin 20 milliGRAM(s) Oral at bedtime  cefTRIAXone   IVPB 1000 milliGRAM(s) IV Intermittent every 24 hours  chlorhexidine 2% Cloths 1 Application(s) Topical <User Schedule>  heparin   Injectable 5000 Unit(s) SubCutaneous every 8 hours    MEDICATIONS  (PRN):    Vital Signs Last 24 Hrs  T(F): 97.6 (31 May 2023 05:06), Max: 99.4 (30 May 2023 20:51)  HR: 66 (31 May 2023 05:06) (66 - 77)  BP: 113/72 (31 May 2023 05:06) (93/62 - 113/72)  RR: 16 (31 May 2023 05:06) (16 - 21)  SpO2: 97% (31 May 2023 05:06) (95% - 98%)  I&O's Summary    PHYSICAL EXAM:  General: NAD, A/O x 3  ENT: No gross hearing impairment, Moist mucous membranes, no thrush  Neck: Supple, No JVD  Lungs: Clear to auscultation bilaterally, good air entry, non-labored breathing  Cardio: RRR, S1/S2, No murmur  Abdomen: Soft, Nontender, Nondistended; Bowel sounds present  Extremities: No calf tenderness, No cyanosis, No pitting edema  Psych: Appropriate mood and affect    LABS:                        12.3   17.94 )-----------( 196      ( 30 May 2023 07:10 )             37.7     05-30    141  |  106  |  15  ----------------------------<  87  3.6   |  27  |  1.19    Ca    8.4      30 May 2023 07:10  Phos  3.1     05-30  Mg     1.7     05-30    TPro  5.9  /  Alb  2.7  /  TBili  0.6  /  DBili  x   /  AST  28  /  ALT  29  /  AlkPhos  41  05-30          PT/INR - ( 29 May 2023 11:56 )   PT: 12.9 sec;   INR: 1.11 ratio         PTT - ( 29 May 2023 11:56 )  PTT:25.7 sec  Lactate, Blood: 1.3 mmol/L ( @ 13:23)  Lactate, Blood: 2.8 mmol/L ( @ 11:56)    CARDIAC MARKERS ( 29 May 2023 11:56 )  x     / 6.4 ng/L / x     / x     / x        Urinalysis Basic - ( 29 May 2023 13:00 )    Color: Yellow / Appearance: Cloudy / S.045 / pH: x  Gluc: x / Ketone: Trace mg/dL  / Bili: Negative / Urobili: 0.2 mg/dL   Blood: x / Protein: 100 mg/dL / Nitrite: Negative   Leuk Esterase: Large / RBC: 4 /HPF / WBC 16 /HPF   Sq Epi: x / Non Sq Epi: x / Bacteria: Occasional /HPF    Culture - Urine (collected 29 May 2023 11:56)  Source: Clean Catch Clean Catch (Midstream)  Preliminary Report (31 May 2023 00:36):    10,000 - 49,000 CFU/mL Enterococcus faecalis    Culture - Blood (collected 29 May 2023 11:56)  Source: .Blood Blood-Peripheral  Preliminary Report (30 May 2023 18:01):    No growth to date.    Culture - Blood (collected 29 May 2023 11:56)  Source: .Blood Blood-Peripheral  Preliminary Report (30 May 2023 18:01):    No growth to date.    RADIOLOGY & ADDITIONAL TESTS:    Care Discussed with Consultants/Other Providers:    Patient is a 83y old  Male who presents with a chief complaint of UTI, Severe Sepsis (30 May 2023 17:14)    Feels well.  Denies chest pain, sob, nausea, vomiting, abdomina pain.      Patient seen and examined at bedside. No overnight events reported.     ALLERGIES:  No Known Allergies    MEDICATIONS  (STANDING):  atorvastatin 20 milliGRAM(s) Oral at bedtime  cefTRIAXone   IVPB 1000 milliGRAM(s) IV Intermittent every 24 hours  chlorhexidine 2% Cloths 1 Application(s) Topical <User Schedule>  heparin   Injectable 5000 Unit(s) SubCutaneous every 8 hours    MEDICATIONS  (PRN):    Vital Signs Last 24 Hrs  T(F): 97.6 (31 May 2023 05:06), Max: 99.4 (30 May 2023 20:51)  HR: 66 (31 May 2023 05:06) (66 - 77)  BP: 113/72 (31 May 2023 05:06) (93/62 - 113/72)  RR: 16 (31 May 2023 05:06) (16 - 21)  SpO2: 97% (31 May 2023 05:06) (95% - 98%)  I&O's Summary    PHYSICAL EXAM:  General: NAD, A/O x 3  ENT: No gross hearing impairment, Moist mucous membranes, no thrush  Neck: Supple, No JVD  Lungs: Clear to auscultation bilaterally, good air entry, non-labored breathing  Cardio: RRR, S1/S2, No murmur  Abdomen: Soft, Nontender, Nondistended; Bowel sounds present  Extremities: No calf tenderness, No cyanosis, No pitting edema  Psych: Appropriate mood and affect    LABS:                        12.3   17.94 )-----------( 196      ( 30 May 2023 07:10 )             37.7     05-30    141  |  106  |  15  ----------------------------<  87  3.6   |  27  |  1.19    Ca    8.4      30 May 2023 07:10  Phos  3.1     05-30  Mg     1.7     05-30    TPro  5.9  /  Alb  2.7  /  TBili  0.6  /  DBili  x   /  AST  28  /  ALT  29  /  AlkPhos  41  05-30          PT/INR - ( 29 May 2023 11:56 )   PT: 12.9 sec;   INR: 1.11 ratio         PTT - ( 29 May 2023 11:56 )  PTT:25.7 sec  Lactate, Blood: 1.3 mmol/L ( @ 13:23)  Lactate, Blood: 2.8 mmol/L ( @ 11:56)    CARDIAC MARKERS ( 29 May 2023 11:56 )  x     / 6.4 ng/L / x     / x     / x        Urinalysis Basic - ( 29 May 2023 13:00 )    Color: Yellow / Appearance: Cloudy / S.045 / pH: x  Gluc: x / Ketone: Trace mg/dL  / Bili: Negative / Urobili: 0.2 mg/dL   Blood: x / Protein: 100 mg/dL / Nitrite: Negative   Leuk Esterase: Large / RBC: 4 /HPF / WBC 16 /HPF   Sq Epi: x / Non Sq Epi: x / Bacteria: Occasional /HPF    Culture - Urine (collected 29 May 2023 11:56)  Source: Clean Catch Clean Catch (Midstream)  Preliminary Report (31 May 2023 00:36):    10,000 - 49,000 CFU/mL Enterococcus faecalis    Culture - Blood (collected 29 May 2023 11:56)  Source: .Blood Blood-Peripheral  Preliminary Report (30 May 2023 18:01):    No growth to date.    Culture - Blood (collected 29 May 2023 11:56)  Source: .Blood Blood-Peripheral  Preliminary Report (30 May 2023 18:01):    No growth to date.    RADIOLOGY & ADDITIONAL TESTS:    Care Discussed with Consultants/Other Providers:

## 2023-05-31 NOTE — DISCHARGE NOTE PROVIDER - NSDCCPCAREPLAN_GEN_ALL_CORE_FT
PRINCIPAL DISCHARGE DIAGNOSIS  Diagnosis: UTI (urinary tract infection)  Assessment and Plan of Treatment: You were admitted for fever, chills suspected of having urinary tract infection.    You were treated with IV antibiotics with improvement of your symptoms.    You were prescribed the following new medications:  Ceftin 250 mg BID for 3 more days  You will need to follow up with your primary care physician.  Source of Infection: Urinary Tract Infection   Antibiotic / Last Day: Ceftin 250 mg BID - last day 6/3/2023  Discharging Provider:  Kishan Wang MD  Contact Info: Cell [phone] - Please call with any questions or concerns.      SECONDARY DISCHARGE DIAGNOSES  Diagnosis: Sepsis  Assessment and Plan of Treatment:     Diagnosis: Hypotension  Assessment and Plan of Treatment:

## 2023-06-01 ENCOUNTER — NON-APPOINTMENT (OUTPATIENT)
Age: 83
End: 2023-06-01

## 2023-06-01 LAB
-  AMPICILLIN: SIGNIFICANT CHANGE UP
-  CIPROFLOXACIN: SIGNIFICANT CHANGE UP
-  LEVOFLOXACIN: SIGNIFICANT CHANGE UP
-  NITROFURANTOIN: SIGNIFICANT CHANGE UP
-  TETRACYCLINE: SIGNIFICANT CHANGE UP
-  VANCOMYCIN: SIGNIFICANT CHANGE UP
METHOD TYPE: SIGNIFICANT CHANGE UP

## 2023-06-01 RX ORDER — UBIDECARENONE 30 MG
30 CAPSULE ORAL DAILY
Refills: 0 | Status: ACTIVE | COMMUNITY
Start: 2023-06-01

## 2023-06-02 ENCOUNTER — APPOINTMENT (OUTPATIENT)
Dept: FAMILY MEDICINE | Facility: CLINIC | Age: 83
End: 2023-06-02
Payer: MEDICARE

## 2023-06-02 VITALS — RESPIRATION RATE: 20 BRPM | HEART RATE: 68 BPM | DIASTOLIC BLOOD PRESSURE: 70 MMHG | SYSTOLIC BLOOD PRESSURE: 126 MMHG

## 2023-06-02 DIAGNOSIS — N39.0 URINARY TRACT INFECTION, SITE NOT SPECIFIED: ICD-10-CM

## 2023-06-02 DIAGNOSIS — A41.9 SEPSIS, UNSPECIFIED ORGANISM: ICD-10-CM

## 2023-06-02 PROCEDURE — 99496 TRANSJ CARE MGMT HIGH F2F 7D: CPT

## 2023-06-02 NOTE — HISTORY OF PRESENT ILLNESS
[Post-hospitalization from ___ Hospital] : Post-hospitalization from [unfilled] Hospital [Admitted on: ___] : The patient was admitted on [unfilled] [Discharged on ___] : discharged on [unfilled] [Discharge Summary] : discharge summary [Pertinent Labs] : pertinent labs [Radiology Findings] : radiology findings [Discharge Med List] : discharge medication list [Other: ____] : [unfilled] [Med Reconciliation] : medication reconciliation has been completed [Patient Contacted By: ____] : and contacted by [unfilled] [FreeTextEntry2] : Presented to the ER with chills, temp to 104, altered mental status; noted to be hypotensive despite vigorous IV hydration; noted to have cloudy, foul urine.  Admitted with sepsis and presumptive UTI; responded to IV antibiotic transitioned to oral for discharge.  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  Pt has been reluctant to start the Eliquis as prescribed at discharge, but discussed at length at this encounter - discussed the possibility and ramifications of an embolic event - pt does agree to begin Eliquis as ordered.  Otherwise pt feels well at this encounter.

## 2023-06-02 NOTE — PHYSICAL EXAM
[No Acute Distress] : no acute distress [No Edema] : there was no peripheral edema [Normal] : soft, non-tender, non-distended, no masses palpated, no HSM and normal bowel sounds [Normal Posterior Cervical Nodes] : no posterior cervical lymphadenopathy [Normal Anterior Cervical Nodes] : no anterior cervical lymphadenopathy [No CVA Tenderness] : no CVA  tenderness [Coordination Grossly Intact] : coordination grossly intact [No Focal Deficits] : no focal deficits [Normal Gait] : normal gait [Speech Grossly Normal] : speech grossly normal [Memory Grossly Normal] : memory grossly normal [Normal Affect] : the affect was normal [Alert and Oriented x3] : oriented to person, place, and time [Normal Mood] : the mood was normal [Normal Insight/Judgement] : insight and judgment were intact

## 2023-06-02 NOTE — PLAN
[FreeTextEntry1] : Complete course of antibiotics\par To start Eliquis as noted above\par Vascular F/U scheduled

## 2023-06-03 LAB
CULTURE RESULTS: SIGNIFICANT CHANGE UP
ORGANISM # SPEC MICROSCOPIC CNT: SIGNIFICANT CHANGE UP
ORGANISM # SPEC MICROSCOPIC CNT: SIGNIFICANT CHANGE UP
SPECIMEN SOURCE: SIGNIFICANT CHANGE UP

## 2023-06-21 ENCOUNTER — APPOINTMENT (OUTPATIENT)
Dept: VASCULAR SURGERY | Facility: CLINIC | Age: 83
End: 2023-06-21
Payer: MEDICARE

## 2023-06-21 VITALS
DIASTOLIC BLOOD PRESSURE: 75 MMHG | BODY MASS INDEX: 22.8 KG/M2 | WEIGHT: 172 LBS | OXYGEN SATURATION: 97 % | HEIGHT: 73 IN | RESPIRATION RATE: 18 BRPM | SYSTOLIC BLOOD PRESSURE: 123 MMHG | HEART RATE: 69 BPM

## 2023-06-21 PROCEDURE — 99213 OFFICE O/P EST LOW 20 MIN: CPT

## 2023-06-21 NOTE — ASSESSMENT
[FreeTextEntry1] : Large hepatic partially thrombosed aneurysm. Asymptomatic.  Recovered from Uro sepsis. Aneurysm collateral anatomy is not clear due to the presence of clot within aneurysm. GDA seems to be patent at aneurysm site.  Eliquis given to prevent full thrombosis prior to further testing to evaluate collateral flow.

## 2023-06-21 NOTE — HISTORY OF PRESENT ILLNESS
[FreeTextEntry1] : Hospital HPI:\par Mr. Garces is an 83 year old male with a PMH of HTN, HLD  and urinary\par retention / LUTS who presented to [facility] ED with c/o AMS, malaise, chills and\par fever x 1 day. Pt noted to be with borderline hypotension despite IVF\par resuscitation efforts by ED staff and empiric abx coverage. Urine cloudy.\par Lactate cleared from 2.8-->1.3. ICU team consulted.  (29 May 2023 17:44)\par  \par Vascular surgery consulted for incidental finding of common hepatic artery\par aneurysm thrombosis noted on CTAP today. Pt reports no complaints, feels well.\par Of note, pt reports having anterior abdominal wall hernia x40 years and does\par not want any surgical intervention for the hernia. Denies fever, chills, chest\par pain, SOB, abdominal pain, N/V, hematochezia, melena.\par   [de-identified] : Juan is much better. Was on ab for few days after DC and is doing OK on Eliquis. Today to talk about future plan.  He is vary active.  Never had a CT in the past. Denies any abdominal process in the past. No MVA.

## 2023-07-17 ENCOUNTER — OUTPATIENT (OUTPATIENT)
Dept: OUTPATIENT SERVICES | Facility: HOSPITAL | Age: 83
LOS: 1 days | End: 2023-07-17
Payer: MEDICARE

## 2023-07-17 ENCOUNTER — APPOINTMENT (OUTPATIENT)
Dept: CT IMAGING | Facility: HOSPITAL | Age: 83
End: 2023-07-17
Payer: MEDICARE

## 2023-07-17 DIAGNOSIS — Z98.890 OTHER SPECIFIED POSTPROCEDURAL STATES: Chronic | ICD-10-CM

## 2023-07-17 DIAGNOSIS — I72.8 ANEURYSM OF OTHER SPECIFIED ARTERIES: ICD-10-CM

## 2023-07-17 DIAGNOSIS — N40.0 BENIGN PROSTATIC HYPERPLASIA WITHOUT LOWER URINARY TRACT SYMPTOMS: Chronic | ICD-10-CM

## 2023-07-17 PROCEDURE — 74174 CTA ABD&PLVS W/CONTRAST: CPT

## 2023-07-17 PROCEDURE — 74174 CTA ABD&PLVS W/CONTRAST: CPT | Mod: 26

## 2023-07-25 ENCOUNTER — APPOINTMENT (OUTPATIENT)
Dept: CARDIOLOGY | Facility: CLINIC | Age: 83
End: 2023-07-25
Payer: MEDICARE

## 2023-07-25 ENCOUNTER — NON-APPOINTMENT (OUTPATIENT)
Age: 83
End: 2023-07-25

## 2023-07-25 VITALS
RESPIRATION RATE: 17 BRPM | SYSTOLIC BLOOD PRESSURE: 131 MMHG | HEIGHT: 73 IN | HEART RATE: 63 BPM | OXYGEN SATURATION: 96 % | BODY MASS INDEX: 23.33 KG/M2 | DIASTOLIC BLOOD PRESSURE: 88 MMHG | WEIGHT: 176 LBS

## 2023-07-25 DIAGNOSIS — Z86.79 PERSONAL HISTORY OF OTHER DISEASES OF THE CIRCULATORY SYSTEM: ICD-10-CM

## 2023-07-25 PROCEDURE — 99215 OFFICE O/P EST HI 40 MIN: CPT | Mod: 25

## 2023-07-25 PROCEDURE — 93000 ELECTROCARDIOGRAM COMPLETE: CPT

## 2023-07-25 NOTE — CARDIOLOGY SUMMARY
[de-identified] : July 25 2023. Sinus Rhythm \par WITHIN NORMAL LIMITS [de-identified] : July 30, 2021. Fair exercise performance without chest pain. Normal hemodynamic and EKG response to exercise.  [de-identified] : July 30, 2021. Normal LV function. Mild MR and AR

## 2023-07-25 NOTE — DISCUSSION/SUMMARY
[FreeTextEntry1] : 83-year-old man with hypertension and hyperlipidemia.\par Status post recent hospitalization for work-up and management of fever.  He was incidentally noted to have a hepatic artery aneurysm with thrombosis and was started on anticoagulation.  He has had close follow-up with vascular.\par He has a good functional capacity has been asymptomatic with respect to cardiac issues.\par Blood pressure stable.  There is no JVD.  Lung fields are clear.  No edema.  He is euvolemic.\par EKG sinus rhythm, within normal limits.\par Prior noninvasive cardiac testing including exercise stress test and echocardiogram were favorable.\par Current cardiac status appears to be stable.\par \par Plan\par 1.  Current medication list is reviewed, no changes.\par 2.  He will continue with antihypertensive medication and statin.\par 3.  He has scheduled follow-up with vascular.\par 4.  He will follow-up with me in the office in 6 months.\par 5.  Advised him to monitor for any cardiac symptoms and to report back to me if there are any changes or if he has any other concerns.  Cardiac issues were discussed, all questions answered.\par

## 2023-07-25 NOTE — REASON FOR VISIT
[FreeTextEntry1] : Cardiology follow-up visit for evaluation management of hypertension and hyperlipidemia.\par \par

## 2023-07-26 ENCOUNTER — APPOINTMENT (OUTPATIENT)
Dept: VASCULAR SURGERY | Facility: CLINIC | Age: 83
End: 2023-07-26
Payer: MEDICARE

## 2023-07-26 VITALS
HEIGHT: 73 IN | WEIGHT: 176 LBS | RESPIRATION RATE: 16 BRPM | OXYGEN SATURATION: 96 % | BODY MASS INDEX: 23.33 KG/M2 | SYSTOLIC BLOOD PRESSURE: 121 MMHG | DIASTOLIC BLOOD PRESSURE: 72 MMHG | HEART RATE: 71 BPM

## 2023-07-26 PROCEDURE — 99213 OFFICE O/P EST LOW 20 MIN: CPT

## 2023-07-26 NOTE — ASSESSMENT
[FreeTextEntry1] : Large hepatic partially thrombosed aneurysm. Asymptomatic.  Recovered from Uro sepsis. Aneurysm collateral anatomy is not clear due to the presence of clot within aneurysm. GDA seems to be patent at aneurysm site.  Eliquis given to prevent full thrombosis prior to further testing to evaluate collateral flow. \par \par Today he is still asymptomatic. Repeat CTA is unchanged from prior. Unclear if distal hepatic artery is patent as well as GDA location (right at aneurysm).

## 2023-07-26 NOTE — DATA REVIEWED
[FreeTextEntry1] : \par INTERPRETATION: CLINICAL INFORMATION: Hepatic artery aneurysm\par \par COMPARISON: CT 5/31/2023\par \par CONTRAST/COMPLICATIONS:\par IV Contrast: Omnipaque 350 90 cc administered 10 cc discarded\par Oral Contrast: NONE\par Complications: None reported at time of study completion\par \par PROCEDURE:\par CT Angiography of the Abdomen and Pelvis.\par Arterial phase images were acquired.\par Sagittal and coronal reformats were performed as well as 3D (MIP) reconstructions.\par \par FINDINGS:\par LOWER CHEST: Within normal limits.\par \par LIVER: Subcentimeter left hepatic lobe cyst.\par BILE DUCTS: Normal caliber.\par GALLBLADDER: Within normal limits.\par SPLEEN: Within normal limits.\par PANCREAS: Within normal limits.\par ADRENALS: Within normal limits.\par KIDNEYS/URETERS: Right renal cortical scarring. Left renal cysts.\par \par BLADDER: Within normal limits.\par REPRODUCTIVE ORGANS: Prostate within normal limits.\par \par BOWEL: Diverticulum at the third portion of the duodenum as well as an additional smaller periampullary duodenal diverticulum. No bowel obstruction. Colonic diverticulosis. Appendix is normal.\par PERITONEUM: No ascites.\par VESSELS: Atherosclerotic changes. 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 Mild narrowing of the proximal SMA. Partially thrombosed aneurysm of the common hepatic artery which measures up to 2.7 cm in maximal diameter and 4.3 cm in length not significantly changed from prior CT 5/31/2023. 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 Jul 20 2023 1:38PM

## 2023-07-26 NOTE — HISTORY OF PRESENT ILLNESS
[FreeTextEntry1] : Hospital HPI:\par Mr. Garces is an 83 year old male with a PMH of HTN, HLD  and urinary\par retention / LUTS who presented to [facility] ED with c/o AMS, malaise, chills and\par fever x 1 day. Pt noted to be with borderline hypotension despite IVF\par resuscitation efforts by ED staff and empiric abx coverage. Urine cloudy.\par Lactate cleared from 2.8-->1.3. ICU team consulted.  (29 May 2023 17:44)\par  \par Vascular surgery consulted for incidental finding of common hepatic artery\par aneurysm thrombosis noted on CTAP today. Pt reports no complaints, feels well.\par Of note, pt reports having anterior abdominal wall hernia x40 years and does\par not want any surgical intervention for the hernia. Denies fever, chills, chest\par pain, SOB, abdominal pain, N/V, hematochezia, melena.\par  \par 6/21/23\par Juan is much better. Was on ab for few days after DC and is doing OK on Eliquis. Today to talk about future plan.  He is vary active.  Never had a CT in the past. Denies any abdominal process in the past. No MVA.  [de-identified] : Juan is here to go over repeat CTA to plan intervention. He remains asymptomatic.

## 2023-07-27 ENCOUNTER — NON-APPOINTMENT (OUTPATIENT)
Age: 83
End: 2023-07-27

## 2023-08-21 ENCOUNTER — APPOINTMENT (OUTPATIENT)
Dept: CARDIOLOGY | Facility: CLINIC | Age: 83
End: 2023-08-21
Payer: MEDICARE

## 2023-08-21 VITALS
WEIGHT: 176 LBS | TEMPERATURE: 96.4 F | OXYGEN SATURATION: 97 % | BODY MASS INDEX: 23.33 KG/M2 | DIASTOLIC BLOOD PRESSURE: 77 MMHG | HEIGHT: 73 IN | RESPIRATION RATE: 20 BRPM | HEART RATE: 67 BPM | SYSTOLIC BLOOD PRESSURE: 115 MMHG

## 2023-08-21 PROCEDURE — 93000 ELECTROCARDIOGRAM COMPLETE: CPT

## 2023-08-21 PROCEDURE — 99213 OFFICE O/P EST LOW 20 MIN: CPT | Mod: 25

## 2023-08-21 NOTE — CARDIOLOGY SUMMARY
[de-identified] : July 25 2023. Sinus Rhythm \par  WITHIN NORMAL LIMITS [de-identified] : July 30, 2021. Fair exercise performance without chest pain. Normal hemodynamic and EKG response to exercise.  [de-identified] : July 30, 2021. Normal LV function. Mild MR and AR

## 2023-08-21 NOTE — HISTORY OF PRESENT ILLNESS
[FreeTextEntry1] : Cardiology follow-up visit for preprocedure cardiac clearance.  Patient being considered for mesenteric angiogram and possible embolization of hepatic artery aneurysm.  He has hypertension, hyperlipidemia, hepatic artery aneurysm, anticoagulated.  Since last visit with me, the patient was evaluated by vascular surgeon, and is felt to be a good candidate for mesenteric angiogram and possible embolization of hepatic artery aneurysm.  He was advised to get preprocedure cardiac clearance. He remains very active.  He goes for regular walks, does work around his house and plays golf without any cardiopulmonary limitations. No complaints of chest pain or chest pressure.  No shortness of breath or dyspnea on exertion.  Denies palpitations.  No dizziness, lightheadedness, syncope or near syncope.  No edema, orthopnea.  No PND. He has been on anticoagulation for hepatic artery thrombosis and denies any complications related to anticoagulation.  Denies any excessive ecchymosis, bleeding, black or bloody stools, hematuria or epistaxis.

## 2023-08-21 NOTE — DISCUSSION/SUMMARY
[FreeTextEntry1] : 83-year-old man presents for preprocedure cardiac clearance.  Patient being considered for mesenteric angiogram and possible embolization of hepatic artery aneurysm. He has hypertension, hyperlipidemia. He has a good functional capacity and has been asymptomatic with respect to cardiac issues. Blood pressure stable.  There is no JVD.  Lung fields are clear.  No edema.  He is euvolemic. EKG is normal sinus rhythm, within normal limits. There is no evidence of recent myocardial infarction, congestive heart failure or malignant cardiac arrhythmia. The current cardiac status appears to be stable.  Plan  1.  Planned procedure can proceed at an acceptable cardiac risk.  There are no cardiac contraindications to proceeding with the procedure. 2.  Current medication list is reviewed, no changes. 3.  He was advised to hold anticoagulation for 2 days prior to the procedure to minimize bleeding risk, and this can be done at an acceptable cardiac risk. 4.  He will follow-up with me in the office at his next scheduled visit. 5.  The above was reviewed with him, all questions were answered to his satisfaction.

## 2023-09-14 ENCOUNTER — OUTPATIENT (OUTPATIENT)
Dept: OUTPATIENT SERVICES | Facility: HOSPITAL | Age: 83
LOS: 1 days | End: 2023-09-14
Payer: MEDICARE

## 2023-09-14 VITALS
DIASTOLIC BLOOD PRESSURE: 75 MMHG | OXYGEN SATURATION: 97 % | RESPIRATION RATE: 16 BRPM | TEMPERATURE: 97 F | HEART RATE: 62 BPM | HEIGHT: 73.5 IN | SYSTOLIC BLOOD PRESSURE: 131 MMHG | WEIGHT: 175.93 LBS

## 2023-09-14 DIAGNOSIS — Z01.818 ENCOUNTER FOR OTHER PREPROCEDURAL EXAMINATION: ICD-10-CM

## 2023-09-14 DIAGNOSIS — I72.8 ANEURYSM OF OTHER SPECIFIED ARTERIES: ICD-10-CM

## 2023-09-14 DIAGNOSIS — Z98.890 OTHER SPECIFIED POSTPROCEDURAL STATES: Chronic | ICD-10-CM

## 2023-09-14 DIAGNOSIS — N40.0 BENIGN PROSTATIC HYPERPLASIA WITHOUT LOWER URINARY TRACT SYMPTOMS: Chronic | ICD-10-CM

## 2023-09-14 LAB
A1C WITH ESTIMATED AVERAGE GLUCOSE RESULT: 5.5 % — SIGNIFICANT CHANGE UP (ref 4–5.6)
ALBUMIN SERPL ELPH-MCNC: 3.6 G/DL — SIGNIFICANT CHANGE UP (ref 3.3–5)
ALP SERPL-CCNC: 68 U/L — SIGNIFICANT CHANGE UP (ref 40–120)
ALT FLD-CCNC: 31 U/L — SIGNIFICANT CHANGE UP (ref 12–78)
ANION GAP SERPL CALC-SCNC: 5 MMOL/L — SIGNIFICANT CHANGE UP (ref 5–17)
APTT BLD: 39.2 SEC — HIGH (ref 24.5–35.6)
AST SERPL-CCNC: 34 U/L — SIGNIFICANT CHANGE UP (ref 15–37)
BILIRUB SERPL-MCNC: 0.8 MG/DL — SIGNIFICANT CHANGE UP (ref 0.2–1.2)
BUN SERPL-MCNC: 16 MG/DL — SIGNIFICANT CHANGE UP (ref 7–23)
CALCIUM SERPL-MCNC: 9 MG/DL — SIGNIFICANT CHANGE UP (ref 8.5–10.1)
CHLORIDE SERPL-SCNC: 106 MMOL/L — SIGNIFICANT CHANGE UP (ref 96–108)
CO2 SERPL-SCNC: 30 MMOL/L — SIGNIFICANT CHANGE UP (ref 22–31)
CREAT SERPL-MCNC: 0.94 MG/DL — SIGNIFICANT CHANGE UP (ref 0.5–1.3)
EGFR: 81 ML/MIN/1.73M2 — SIGNIFICANT CHANGE UP
ESTIMATED AVERAGE GLUCOSE: 111 MG/DL — SIGNIFICANT CHANGE UP (ref 68–114)
GLUCOSE SERPL-MCNC: 104 MG/DL — HIGH (ref 70–99)
HCT VFR BLD CALC: 43.5 % — SIGNIFICANT CHANGE UP (ref 39–50)
HGB BLD-MCNC: 14.1 G/DL — SIGNIFICANT CHANGE UP (ref 13–17)
INR BLD: 1.09 RATIO — SIGNIFICANT CHANGE UP (ref 0.85–1.18)
MCHC RBC-ENTMCNC: 26.5 PG — LOW (ref 27–34)
MCHC RBC-ENTMCNC: 32.4 GM/DL — SIGNIFICANT CHANGE UP (ref 32–36)
MCV RBC AUTO: 81.6 FL — SIGNIFICANT CHANGE UP (ref 80–100)
NRBC # BLD: 0 /100 WBCS — SIGNIFICANT CHANGE UP (ref 0–0)
PLATELET # BLD AUTO: 247 K/UL — SIGNIFICANT CHANGE UP (ref 150–400)
POTASSIUM SERPL-MCNC: 4 MMOL/L — SIGNIFICANT CHANGE UP (ref 3.5–5.3)
POTASSIUM SERPL-SCNC: 4 MMOL/L — SIGNIFICANT CHANGE UP (ref 3.5–5.3)
PROT SERPL-MCNC: 7.7 G/DL — SIGNIFICANT CHANGE UP (ref 6–8.3)
PROTHROM AB SERPL-ACNC: 12.7 SEC — SIGNIFICANT CHANGE UP (ref 9.5–13)
RBC # BLD: 5.33 M/UL — SIGNIFICANT CHANGE UP (ref 4.2–5.8)
RBC # FLD: 14 % — SIGNIFICANT CHANGE UP (ref 10.3–14.5)
SODIUM SERPL-SCNC: 141 MMOL/L — SIGNIFICANT CHANGE UP (ref 135–145)
WBC # BLD: 6.33 K/UL — SIGNIFICANT CHANGE UP (ref 3.8–10.5)
WBC # FLD AUTO: 6.33 K/UL — SIGNIFICANT CHANGE UP (ref 3.8–10.5)

## 2023-09-14 PROCEDURE — 86900 BLOOD TYPING SEROLOGIC ABO: CPT

## 2023-09-14 PROCEDURE — 86901 BLOOD TYPING SEROLOGIC RH(D): CPT

## 2023-09-14 PROCEDURE — 85027 COMPLETE CBC AUTOMATED: CPT

## 2023-09-14 PROCEDURE — 80053 COMPREHEN METABOLIC PANEL: CPT

## 2023-09-14 PROCEDURE — 85610 PROTHROMBIN TIME: CPT

## 2023-09-14 PROCEDURE — 86850 RBC ANTIBODY SCREEN: CPT

## 2023-09-14 PROCEDURE — 36415 COLL VENOUS BLD VENIPUNCTURE: CPT

## 2023-09-14 PROCEDURE — 85730 THROMBOPLASTIN TIME PARTIAL: CPT

## 2023-09-14 PROCEDURE — G0463: CPT

## 2023-09-14 PROCEDURE — 83036 HEMOGLOBIN GLYCOSYLATED A1C: CPT

## 2023-09-14 NOTE — H&P PST ADULT - HISTORY OF PRESENT ILLNESS
84 y/o male with PMH of HTN, HLD, BPH, and Melanoma & SHx Mohs excision x 3, Laser Prostate, and Colonoscopy for PST having Mesenteric Angiogram Diagnostic - Possible Embolization by Dr. Mackey on 9/28/2023.  He reports had single episode of abdominal pain 6 weeks ago imaging revealed incidental finding of hepatic artery aneurysm  He denies any abdominal pain, nausea, vomiting, or bowel changes.  Seen by provider and recommended for elective procedure.

## 2023-09-14 NOTE — H&P PST ADULT - ANTERIOR CERVICAL R
normal small non-tender moveable rubbery LN noted, advised to f/u with PCP reports viral illness 2 weeks ago/mobile

## 2023-09-14 NOTE — H&P PST ADULT - PROBLEM SELECTOR PLAN 2
Labs CBC, CMP, A1C, PT/PTT/INR & T&S   EKG from 7/25/2023 on chart  Medical and Cardiac clearance necessary  Pre op and Hibiclens instructions reviewed and given.   No meds to take am of surgery.   Instructed to hold and/or avoid other NSAIDs and OTC supplements. Tylenol is ok. Verbalized understanding

## 2023-09-14 NOTE — H&P PST ADULT - NSICDXPASTMEDICALHX_GEN_ALL_CORE_FT
PAST MEDICAL HISTORY:  Hepatic artery aneurysm     History of BPH     HLD (hyperlipidemia)     HTN (hypertension)     Melanoma

## 2023-09-14 NOTE — H&P PST ADULT - NSICDXPROCEDURE_GEN_ALL_CORE_FT
PROCEDURES:  Abdominal aortography with serialography 14-Sep-2023 10:56:12 [id number] RS&I Shell Garcia  Embolization of artery with attainment of vascular access using cutdown technique 14-Sep-2023 10:58:00 RS&I & [id number] vascular embolization or occlusion of venous RS&I Shell Garcia

## 2023-09-14 NOTE — H&P PST ADULT - NSICDXFAMILYHX_GEN_ALL_CORE_FT
FAMILY HISTORY:  Mother  Still living? Unknown  FH: colon cancer, Age at diagnosis: Age Unknown    Sibling  Still living? No  FH: melanoma, Age at diagnosis: Age Unknown

## 2023-09-14 NOTE — H&P PST ADULT - ANTERIOR CERVICAL L
small moveable rubbery LN noted, advised to f/u with PCP reports viral illness 2 weeks ago/mobile small moveable rubbery LN noted, advised to f/u with PCP reports viral illness 2 weeks ago/normal

## 2023-09-14 NOTE — H&P PST ADULT - PHONE #
"  Problem: Suicide Risk  Goal: Absence of Self-Harm  Outcome: Progressing  Intervention: Promote Psychosocial Wellbeing  Recent Flowsheet Documentation  Taken 2/23/2023 1700 by Mario Marcelo RN  Family/Support System Care: self-care encouraged        Patient spent most of the evening in his room either watching television or sleeping in bed. Encourage to come out and socialize with peers and staff but he declined \"I prefer staying in my room\". Patient reported anxiety 5/10 and depression 8/10 with no prn given, had no complaint of pain and discomfort. Patient stated that he endorses passive suicidal ideation with no intent or plan\" I feel safe here in the hospital\".  Patient has new orders for labs for 2/24/23, IM consult for ECT clearance due to EKG abnormality and [place] . Patient ate about 100% of dinner with no nausea or stomach discomfort. Continues to denies auditory and visual hallucination with no SIB and HI noted. Patient contract for safety and medication compliant.         BP (!) 122/94 (BP Location: Left arm)   Pulse 98   Temp 98  F (36.7  C) (Oral)   Resp 16   Ht 1.6 m (5' 3\")   Wt 75.1 kg (165 lb 8 oz)   SpO2 92%   BMI 29.32 kg/m                           " [phone]

## 2023-09-18 ENCOUNTER — APPOINTMENT (OUTPATIENT)
Dept: FAMILY MEDICINE | Facility: CLINIC | Age: 83
End: 2023-09-18
Payer: MEDICARE

## 2023-09-18 VITALS
BODY MASS INDEX: 23.59 KG/M2 | TEMPERATURE: 97.3 F | WEIGHT: 178 LBS | SYSTOLIC BLOOD PRESSURE: 125 MMHG | RESPIRATION RATE: 16 BRPM | HEART RATE: 78 BPM | OXYGEN SATURATION: 98 % | HEIGHT: 73 IN | DIASTOLIC BLOOD PRESSURE: 80 MMHG

## 2023-09-18 DIAGNOSIS — Z01.818 ENCOUNTER FOR OTHER PREPROCEDURAL EXAMINATION: ICD-10-CM

## 2023-09-18 DIAGNOSIS — Z00.00 ENCOUNTER FOR GENERAL ADULT MEDICAL EXAMINATION W/OUT ABNORMAL FINDINGS: ICD-10-CM

## 2023-09-18 DIAGNOSIS — Z79.01 LONG TERM (CURRENT) USE OF ANTICOAGULANTS: ICD-10-CM

## 2023-09-18 PROCEDURE — 99214 OFFICE O/P EST MOD 30 MIN: CPT | Mod: 25

## 2023-09-18 PROCEDURE — 36415 COLL VENOUS BLD VENIPUNCTURE: CPT

## 2023-09-27 ENCOUNTER — RX RENEWAL (OUTPATIENT)
Age: 83
End: 2023-09-27

## 2023-09-27 ENCOUNTER — TRANSCRIPTION ENCOUNTER (OUTPATIENT)
Age: 83
End: 2023-09-27

## 2023-09-27 PROBLEM — Z87.438 PERSONAL HISTORY OF OTHER DISEASES OF MALE GENITAL ORGANS: Chronic | Status: ACTIVE | Noted: 2023-09-14

## 2023-09-27 PROBLEM — I72.8 ANEURYSM OF OTHER SPECIFIED ARTERIES: Chronic | Status: ACTIVE | Noted: 2023-09-14

## 2023-09-27 NOTE — ASU PATIENT PROFILE, ADULT - FALL HARM RISK - HARM RISK INTERVENTIONS

## 2023-09-28 ENCOUNTER — OUTPATIENT (OUTPATIENT)
Dept: OUTPATIENT SERVICES | Facility: HOSPITAL | Age: 83
LOS: 1 days | End: 2023-09-28
Payer: MEDICARE

## 2023-09-28 ENCOUNTER — APPOINTMENT (OUTPATIENT)
Dept: VASCULAR SURGERY | Facility: HOSPITAL | Age: 83
End: 2023-09-28

## 2023-09-28 ENCOUNTER — TRANSCRIPTION ENCOUNTER (OUTPATIENT)
Age: 83
End: 2023-09-28

## 2023-09-28 VITALS
WEIGHT: 175.93 LBS | TEMPERATURE: 98 F | SYSTOLIC BLOOD PRESSURE: 132 MMHG | HEIGHT: 73.5 IN | OXYGEN SATURATION: 98 % | DIASTOLIC BLOOD PRESSURE: 85 MMHG | RESPIRATION RATE: 18 BRPM | HEART RATE: 66 BPM

## 2023-09-28 VITALS
SYSTOLIC BLOOD PRESSURE: 121 MMHG | HEART RATE: 58 BPM | DIASTOLIC BLOOD PRESSURE: 68 MMHG | RESPIRATION RATE: 14 BRPM | OXYGEN SATURATION: 96 %

## 2023-09-28 DIAGNOSIS — Z98.890 OTHER SPECIFIED POSTPROCEDURAL STATES: Chronic | ICD-10-CM

## 2023-09-28 DIAGNOSIS — I72.8 ANEURYSM OF OTHER SPECIFIED ARTERIES: ICD-10-CM

## 2023-09-28 DIAGNOSIS — N40.0 BENIGN PROSTATIC HYPERPLASIA WITHOUT LOWER URINARY TRACT SYMPTOMS: Chronic | ICD-10-CM

## 2023-09-28 LAB — ABO RH CONFIRMATION: SIGNIFICANT CHANGE UP

## 2023-09-28 PROCEDURE — 75726 ARTERY X-RAYS ABDOMEN: CPT | Mod: 26

## 2023-09-28 PROCEDURE — 76937 US GUIDE VASCULAR ACCESS: CPT | Mod: 26

## 2023-09-28 PROCEDURE — 36415 COLL VENOUS BLD VENIPUNCTURE: CPT

## 2023-09-28 PROCEDURE — C1894: CPT

## 2023-09-28 PROCEDURE — 76000 FLUOROSCOPY <1 HR PHYS/QHP: CPT

## 2023-09-28 PROCEDURE — C1887: CPT

## 2023-09-28 PROCEDURE — 36247 INS CATH ABD/L-EXT ART 3RD: CPT

## 2023-09-28 PROCEDURE — 36245 INS CATH ABD/L-EXT ART 1ST: CPT | Mod: LT

## 2023-09-28 PROCEDURE — C1769: CPT

## 2023-09-28 DEVICE — CATH ANGIO GLIDECATH ANGLE 5FR X 100CM: Type: IMPLANTABLE DEVICE | Status: FUNCTIONAL

## 2023-09-28 DEVICE — SHEATH INTRODUCER TERUMO PINNACLE RADIOPAQUE 6FR X 10CM: Type: IMPLANTABLE DEVICE | Status: FUNCTIONAL

## 2023-09-28 DEVICE — SHEATH INTRODUCER TERUMO PINNACLE RADIOPAQUE 5FR X 10CM: Type: IMPLANTABLE DEVICE | Status: FUNCTIONAL

## 2023-09-28 DEVICE — GUIDEWIRE RADIFOCUS GLIDEWIRE STANDARD ANGLED TIP 0.035" X 260CM: Type: IMPLANTABLE DEVICE | Status: FUNCTIONAL

## 2023-09-28 DEVICE — IMPLANTABLE DEVICE: Type: IMPLANTABLE DEVICE | Status: FUNCTIONAL

## 2023-09-28 DEVICE — KIT MICROPUNCTURE 5FR 10CM: Type: IMPLANTABLE DEVICE | Status: FUNCTIONAL

## 2023-09-28 RX ORDER — MULTIVIT-MIN/FERROUS GLUCONATE 9 MG/15 ML
0 LIQUID (ML) ORAL
Refills: 0 | DISCHARGE

## 2023-09-28 RX ORDER — HYDROMORPHONE HYDROCHLORIDE 2 MG/ML
0.5 INJECTION INTRAMUSCULAR; INTRAVENOUS; SUBCUTANEOUS
Refills: 0 | Status: DISCONTINUED | OUTPATIENT
Start: 2023-09-28 | End: 2023-09-28

## 2023-09-28 RX ORDER — SODIUM CHLORIDE 9 MG/ML
1000 INJECTION, SOLUTION INTRAVENOUS
Refills: 0 | Status: DISCONTINUED | OUTPATIENT
Start: 2023-09-28 | End: 2023-09-28

## 2023-09-28 RX ORDER — APIXABAN 2.5 MG/1
1 TABLET, FILM COATED ORAL
Refills: 0 | DISCHARGE

## 2023-09-28 RX ORDER — UBIDECARENONE 100 MG
1 CAPSULE ORAL
Qty: 0 | Refills: 0 | DISCHARGE

## 2023-09-28 RX ORDER — SIMVASTATIN 20 MG/1
1 TABLET, FILM COATED ORAL
Qty: 0 | Refills: 0 | DISCHARGE

## 2023-09-28 RX ORDER — CLOPIDOGREL BISULFATE 75 MG/1
1 TABLET, FILM COATED ORAL
Qty: 90 | Refills: 0
Start: 2023-09-28

## 2023-09-28 RX ORDER — OXYCODONE HYDROCHLORIDE 5 MG/1
5 TABLET ORAL ONCE
Refills: 0 | Status: DISCONTINUED | OUTPATIENT
Start: 2023-09-28 | End: 2023-09-28

## 2023-09-28 RX ORDER — MILK THISTLE 150 MG
1 CAPSULE ORAL
Qty: 0 | Refills: 0 | DISCHARGE

## 2023-09-28 RX ORDER — ONDANSETRON 8 MG/1
4 TABLET, FILM COATED ORAL ONCE
Refills: 0 | Status: DISCONTINUED | OUTPATIENT
Start: 2023-09-28 | End: 2023-09-28

## 2023-09-28 RX ORDER — ASPIRIN/CALCIUM CARB/MAGNESIUM 324 MG
1 TABLET ORAL
Qty: 90 | Refills: 0
Start: 2023-09-28

## 2023-09-28 RX ORDER — CEFAZOLIN SODIUM 1 G
2000 VIAL (EA) INJECTION ONCE
Refills: 0 | Status: COMPLETED | OUTPATIENT
Start: 2023-09-28 | End: 2023-09-28

## 2023-09-28 RX ORDER — LISINOPRIL/HYDROCHLOROTHIAZIDE 10-12.5 MG
1 TABLET ORAL
Qty: 0 | Refills: 0 | DISCHARGE

## 2023-09-28 RX ADMIN — SODIUM CHLORIDE 50 MILLILITER(S): 9 INJECTION, SOLUTION INTRAVENOUS at 06:39

## 2023-09-28 NOTE — ASU PREOP CHECKLIST - ADVANCE DIRECTIVE ADDRESSED/READDRESSED
RM 17   Pt presents today with her family  Pt  will be translating for his wife  Pt moved here 3 months ago from [facility]   Pt filled out  waiver form using [employer] # [id number]    Chief Complaint   Patient presents with   [facility] Establish Care    Leg Pain     both legs of and on from knees down x 1 yr    Arm Pain     both arms off and on for 1 yr       1. Have you been to the ER, urgent care clinic since your last visit? Hospitalized since your last visit? No    2. Have you seen or consulted any other health care providers outside of the [address] [place] [address] since your last visit? Include any pap smears or colon screening.  No    Health Maintenance Due   Topic Date Due    DTaP/Tdap/Td series (1 - Tdap) 12/03/2002    PAP AKA CERVICAL CYTOLOGY  12/03/2002    INFLUENZA AGE 9 TO ADULT  08/01/2017   Pt declines flu vaccine today    Living will sent to pt AVS done

## 2023-09-28 NOTE — ASU DISCHARGE PLAN (ADULT/PEDIATRIC) - PROVIDER TOKENS
administer in food/crush pills for administration/difficulty swallowing pills
PROVIDER:[TOKEN:[[id number]]]

## 2023-09-28 NOTE — ASU DISCHARGE PLAN (ADULT/PEDIATRIC) - CARE PROVIDER_API CALL
Vane Bower  Vascular Surgery  [address] [place] [id number]  Phone: [phone]  Fax: [phone]  Follow Up Time:    Yes - the patient is able to be screened

## 2023-09-28 NOTE — ASU DISCHARGE PLAN (ADULT/PEDIATRIC) - NS MD DC FALL RISK RISK
For information on Fall & Injury Prevention, visit: [email or web address] OR  [email or web address] OR  https://www.cdc.gov/steadi/patient.html

## 2023-10-04 ENCOUNTER — APPOINTMENT (OUTPATIENT)
Dept: VASCULAR SURGERY | Facility: CLINIC | Age: 83
End: 2023-10-04
Payer: MEDICARE

## 2023-10-04 VITALS
RESPIRATION RATE: 16 BRPM | HEIGHT: 73 IN | OXYGEN SATURATION: 97 % | HEART RATE: 66 BPM | SYSTOLIC BLOOD PRESSURE: 123 MMHG | BODY MASS INDEX: 22.8 KG/M2 | WEIGHT: 172 LBS | DIASTOLIC BLOOD PRESSURE: 83 MMHG

## 2023-10-04 PROCEDURE — 99212 OFFICE O/P EST SF 10 MIN: CPT

## 2023-10-04 RX ORDER — APIXABAN 5 MG/1
TABLET, FILM COATED ORAL
Refills: 0 | Status: DISCONTINUED | COMMUNITY
End: 2023-10-04

## 2023-12-08 ENCOUNTER — APPOINTMENT (OUTPATIENT)
Dept: UROLOGY | Facility: CLINIC | Age: 83
End: 2023-12-08
Payer: MEDICARE

## 2023-12-08 DIAGNOSIS — R97.20 ELEVATED PROSTATE, SPECIFIC ANTIGEN [PSA]: ICD-10-CM

## 2023-12-08 DIAGNOSIS — N13.8 BENIGN PROSTATIC HYPERPLASIA WITH LOWER URINARY TRACT SYMPMS: ICD-10-CM

## 2023-12-08 DIAGNOSIS — N52.9 MALE ERECTILE DYSFUNCTION, UNSPECIFIED: ICD-10-CM

## 2023-12-08 DIAGNOSIS — N40.1 BENIGN PROSTATIC HYPERPLASIA WITH LOWER URINARY TRACT SYMPMS: ICD-10-CM

## 2023-12-08 PROCEDURE — 99213 OFFICE O/P EST LOW 20 MIN: CPT

## 2023-12-13 ENCOUNTER — APPOINTMENT (OUTPATIENT)
Dept: VASCULAR SURGERY | Facility: CLINIC | Age: 83
End: 2023-12-13
Payer: MEDICARE

## 2023-12-13 PROCEDURE — 99213 OFFICE O/P EST LOW 20 MIN: CPT

## 2023-12-13 PROCEDURE — 93976 VASCULAR STUDY: CPT

## 2023-12-14 NOTE — HISTORY OF PRESENT ILLNESS
[FreeTextEntry1] : Hospital HPI: Mr. Garces is an 83 year old male with a PMH of HTN, HLD  and urinary retention / LUTS [facility]/o AMS, malaise, chills and fever x 1 day. Pt noted to be with borderline hypotension despite IVF resuscitation efforts by ED staff and empiric abx coverage. Urine cloudy. Lactate cleared from 2.8-->1.3. ICU team consulted.  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 Pt reports no complaints, feels well. Of note, pt reports having anterior abdominal wall hernia x40 years and does not want any surgical intervention for the hernia. Denies fever, chills, chest pain, SOB, abdominal pain, N/V, hematochezia, melena.   6/21/23 Juan is much better. Was on ab for few days after DC and is doing OK on Eliquis. Today to talk about future plan.  He is vary active.  Never had a CT in the past. Denies any abdominal process in the past. No MVA.   9/28/23 S/P mesenteric/hepatic angiogram without intervention  [de-identified] : Pt doing well without complaints. He remains active and asymptomatic.

## 2023-12-14 NOTE — PLAN
[TextEntry] : Continue ASA and statin; can discontinuePlavix at this time Follow up in 5 mos with US when patient returns from [place]
none

## 2023-12-14 NOTE — DATA REVIEWED
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

## 2024-05-08 ENCOUNTER — APPOINTMENT (OUTPATIENT)
Dept: VASCULAR SURGERY | Facility: CLINIC | Age: 84
End: 2024-05-08
Payer: MEDICARE

## 2024-05-08 VITALS
WEIGHT: 174 LBS | RESPIRATION RATE: 16 BRPM | DIASTOLIC BLOOD PRESSURE: 62 MMHG | BODY MASS INDEX: 23.06 KG/M2 | HEART RATE: 81 BPM | HEIGHT: 73 IN | SYSTOLIC BLOOD PRESSURE: 122 MMHG | OXYGEN SATURATION: 97 %

## 2024-05-08 DIAGNOSIS — I72.8 ANEURYSM OF OTHER SPECIFIED ARTERIES: ICD-10-CM

## 2024-05-08 PROCEDURE — 93976 VASCULAR STUDY: CPT

## 2024-05-08 PROCEDURE — 99213 OFFICE O/P EST LOW 20 MIN: CPT

## 2024-05-08 NOTE — PLAN
[TextEntry] : US reviewed with patient-no significant changes Will continue his ASA 81 mg daily and statin. Follow up in 5 months with US before returning to [place]

## 2024-05-08 NOTE — DATA REVIEWED
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

## 2024-05-08 NOTE — HISTORY OF PRESENT ILLNESS
[FreeTextEntry1] : Mr. Garces is an 83 year old male with a PMH of HTN, HLD  and urinary retention / LUTS who presented to [facility] ED with c/o AMS, malaise, chills and fever x 1 day. Pt noted to be with borderline hypotension despite IVF resuscitation efforts by ED staff and empiric abx coverage. Urine cloudy. Lactate cleared from 2.8-->1.3. ICU team consulted.  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 Pt reports no complaints, feels well. Of note, pt reports having anterior abdominal wall hernia x40 years and does not want any surgical intervention for the hernia. Denies fever, chills, chest pain, SOB, abdominal pain, N/V, hematochezia, melena.   6/21/23 Juan is much better. Was on ab for few days after DC and is doing OK on Eliquis. Today to talk about future plan.  He is vary active.  Never had a CT in the past. Denies any abdominal process in the past. No MVA.   9/28/23 S/P mesenteric/hepatic angiogram without intervention  [de-identified] : Pt doing well without complaints of abdominal pain, N/V or diarrhea. He remains active and asymptomatic.

## 2024-05-08 NOTE — PHYSICAL EXAM
[JVD] : no jugular venous distention  [Normal Breath Sounds] : Normal breath sounds [Normal Heart Sounds] : normal heart sounds [2+] : left 2+ [Alert] : alert [Oriented to Person] : oriented to person [Oriented to Place] : oriented to place [Oriented to Time] : oriented to time [de-identified] : MARIANELA MARY in NAD [de-identified] : soft ND NT

## 2024-05-21 ENCOUNTER — NON-APPOINTMENT (OUTPATIENT)
Age: 84
End: 2024-05-21

## 2024-05-21 ENCOUNTER — APPOINTMENT (OUTPATIENT)
Dept: CARDIOLOGY | Facility: CLINIC | Age: 84
End: 2024-05-21
Payer: MEDICARE

## 2024-05-21 VITALS
BODY MASS INDEX: 23.06 KG/M2 | WEIGHT: 174 LBS | HEART RATE: 73 BPM | DIASTOLIC BLOOD PRESSURE: 62 MMHG | HEIGHT: 73 IN | SYSTOLIC BLOOD PRESSURE: 113 MMHG | OXYGEN SATURATION: 96 %

## 2024-05-21 DIAGNOSIS — E78.5 HYPERLIPIDEMIA, UNSPECIFIED: ICD-10-CM

## 2024-05-21 DIAGNOSIS — I10 ESSENTIAL (PRIMARY) HYPERTENSION: ICD-10-CM

## 2024-05-21 PROCEDURE — 99213 OFFICE O/P EST LOW 20 MIN: CPT | Mod: 25

## 2024-05-21 PROCEDURE — 93000 ELECTROCARDIOGRAM COMPLETE: CPT

## 2024-05-21 RX ORDER — APIXABAN 5 MG/1
5 TABLET, FILM COATED ORAL
Qty: 180 | Refills: 1 | Status: DISCONTINUED | COMMUNITY
Start: 2023-08-02 | End: 2024-05-21

## 2024-05-21 RX ORDER — CLOPIDOGREL BISULFATE 75 MG/1
75 TABLET, FILM COATED ORAL DAILY
Qty: 90 | Refills: 3 | Status: DISCONTINUED | COMMUNITY
Start: 2023-10-04 | End: 2024-05-21

## 2024-05-21 NOTE — DISCUSSION/SUMMARY
[FreeTextEntry1] : 84-year-old man with hypertension and hyperlipidemia. He has a good functional capacity has been asymptomatic with respect to cardiac issues. Hepatic artery thrombosis, status post mesenteric angiogram, no intervention, being followed by vascular, previous on anticoagulation now discontinued. On physical examination, blood pressure stable.  He is euvolemic. EKG sinus rhythm, PVC, otherwise within normal limits. The current cardiac status appears to be stable.  Plan 1.  Current medications reviewed, no changes. 2.  Advised to continue to maintain an active aerobic lifestyle. 3.  He will follow-up with me in the office in 1 year. 4.  He will continue follow-up with primary care provider for his overall medical care. 5.  The above was reviewed with him and all of his questions have been answered to his satisfaction.  [EKG obtained to assist in diagnosis and management of assessed problem(s)] : EKG obtained to assist in diagnosis and management of assessed problem(s)

## 2024-05-21 NOTE — CARDIOLOGY SUMMARY
[de-identified] : May 21 2024. Sinus Rhythm -occasional ectopic ventricular beat   WITHIN NORMAL LIMITS [de-identified] : July 30, 2021. Fair exercise performance without chest pain. Normal hemodynamic and EKG response to exercise.  [de-identified] : July 30, 2021. Normal LV function. Mild MR and AR

## 2024-05-21 NOTE — HISTORY OF PRESENT ILLNESS
[FreeTextEntry1] : Since last visit with me, he did have mesenteric angiogram done by vascular surgeon.  Found to have hepatic artery aneurysm that was thrombosed, he had no intervention done. Subsequently anticoagulation was discontinued.  Eliquis and clopidogrel was discontinued and he is now only maintained on aspirin. Reports an active lifestyle. Denies any cardiac complaints.  No chest pain or chest pressure.  No shortness of breath or dyspnea on exertion.  No palpitations.  No dizziness or lightheadedness.  No syncope.  No edema, orthopnea.  No PND.

## 2024-08-15 ENCOUNTER — RX RENEWAL (OUTPATIENT)
Age: 84
End: 2024-08-15

## 2024-09-17 NOTE — ASU PATIENT PROFILE, ADULT - AS SC BRADEN MOISTURE
Communicated with patient in another message. She is aware of results and repeat lab   (4) rarely moist

## 2024-10-02 ENCOUNTER — APPOINTMENT (OUTPATIENT)
Dept: VASCULAR SURGERY | Facility: CLINIC | Age: 84
End: 2024-10-02
Payer: MEDICARE

## 2024-10-02 VITALS — SYSTOLIC BLOOD PRESSURE: 147 MMHG | OXYGEN SATURATION: 97 % | HEART RATE: 52 BPM | DIASTOLIC BLOOD PRESSURE: 83 MMHG

## 2024-10-02 DIAGNOSIS — I72.8 ANEURYSM OF OTHER SPECIFIED ARTERIES: ICD-10-CM

## 2024-10-02 PROCEDURE — 93976 VASCULAR STUDY: CPT

## 2024-10-02 PROCEDURE — 99214 OFFICE O/P EST MOD 30 MIN: CPT

## 2024-10-02 NOTE — HISTORY OF PRESENT ILLNESS
[FreeTextEntry1] : 84-year-old male with known large right hepatic artery aneurysm.  He underwent a diagnostic angiogram in 2000 23 September and there was an assumption that hepatic artery aneurysm was closed.  Subsequent ultrasound showed low flow within the aneurysm.  Patient is is now here for follow-up reports dull ache for about a month he attributed it to trying to increase his golf game and more golf swings.  Reports that it is slowly improved.  Is not necessarily pain but is some right upper quadrant fullness.

## 2024-10-02 NOTE — ASSESSMENT
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  Duplex ultrasound today shows full thrombosis of the hepatic artery aneurysm.  Distal flow cannot be visualized but likely second Delano to difficulty of the exam.  All likelihood hepatic arteries being filled via GDA they appeared quite small on previous CAT scans and given direct aortic angiogram no flow was visualized in the celiac artery likely this has been the case.  Will obtain a CT angiogram if aneurysm is thrombosed and there is flow via collaterals to the hepatic vessels and there is essentially nothing to do.  Patient's mild right upper quadrant discomfort beginning a month ago and improving could be secondary to the initial thrombotic event.  I will call him with the results of his CAT scan.

## 2024-10-02 NOTE — PHYSICAL EXAM
[Respiratory Effort] : normal respiratory effort [2+] : left 2+ [de-identified] : Appears well [de-identified] : soft nontender, non-distended  [de-identified] : No edema.

## 2024-10-03 NOTE — ED ADULT NURSE NOTE - NS ED NURSE LEVEL OF CONSCIOUSNESS SPEECH
65-year-old male history of hypertension otherwise healthy, never smoker, no family history CAD presenting for evaluation of intermittent left-sided chest pain for the past 2 weeks.  No known exacerbating or alleviating factors.  This morning presenting as he noted " numbness" to bilateral chest wall.  No other acute complaints.  Pain nonradiating.  No fevers chills, shortness of breath.  No DVT or PE risk factors.  No history of previous cardiac workup
Speaking Coherently

## 2024-10-16 ENCOUNTER — OUTPATIENT (OUTPATIENT)
Dept: OUTPATIENT SERVICES | Facility: HOSPITAL | Age: 84
LOS: 1 days | End: 2024-10-16
Payer: MEDICARE

## 2024-10-16 ENCOUNTER — APPOINTMENT (OUTPATIENT)
Dept: CT IMAGING | Facility: HOSPITAL | Age: 84
End: 2024-10-16

## 2024-10-16 DIAGNOSIS — Z98.890 OTHER SPECIFIED POSTPROCEDURAL STATES: Chronic | ICD-10-CM

## 2024-10-16 DIAGNOSIS — I72.8 ANEURYSM OF OTHER SPECIFIED ARTERIES: ICD-10-CM

## 2024-10-16 DIAGNOSIS — N40.0 BENIGN PROSTATIC HYPERPLASIA WITHOUT LOWER URINARY TRACT SYMPTOMS: Chronic | ICD-10-CM

## 2024-10-16 PROCEDURE — 74174 CTA ABD&PLVS W/CONTRAST: CPT

## 2024-10-16 PROCEDURE — 74174 CTA ABD&PLVS W/CONTRAST: CPT | Mod: 26

## 2024-10-21 ENCOUNTER — APPOINTMENT (OUTPATIENT)
Dept: FAMILY MEDICINE | Facility: CLINIC | Age: 84
End: 2024-10-21

## 2024-10-21 VITALS
DIASTOLIC BLOOD PRESSURE: 70 MMHG | SYSTOLIC BLOOD PRESSURE: 130 MMHG | HEART RATE: 68 BPM | RESPIRATION RATE: 20 BRPM | WEIGHT: 175 LBS | HEIGHT: 73 IN | BODY MASS INDEX: 23.19 KG/M2

## 2024-10-21 DIAGNOSIS — R73.01 IMPAIRED FASTING GLUCOSE: ICD-10-CM

## 2024-10-21 DIAGNOSIS — Z23 ENCOUNTER FOR IMMUNIZATION: ICD-10-CM

## 2024-10-21 DIAGNOSIS — I10 ESSENTIAL (PRIMARY) HYPERTENSION: ICD-10-CM

## 2024-10-21 DIAGNOSIS — E78.5 HYPERLIPIDEMIA, UNSPECIFIED: ICD-10-CM

## 2024-10-21 PROCEDURE — 99214 OFFICE O/P EST MOD 30 MIN: CPT | Mod: 25

## 2024-10-21 PROCEDURE — 90662 IIV NO PRSV INCREASED AG IM: CPT

## 2024-10-21 PROCEDURE — G0008: CPT

## 2024-10-21 PROCEDURE — 36415 COLL VENOUS BLD VENIPUNCTURE: CPT

## 2024-10-22 LAB
ALBUMIN SERPL ELPH-MCNC: 4.3 G/DL
ALP BLD-CCNC: 59 U/L
ALT SERPL-CCNC: 19 U/L
ANION GAP SERPL CALC-SCNC: 13 MMOL/L
AST SERPL-CCNC: 29 U/L
BILIRUB SERPL-MCNC: 0.4 MG/DL
BUN SERPL-MCNC: 18 MG/DL
CALCIUM SERPL-MCNC: 9.3 MG/DL
CHLORIDE SERPL-SCNC: 102 MMOL/L
CHOLEST SERPL-MCNC: 197 MG/DL
CO2 SERPL-SCNC: 23 MMOL/L
CREAT SERPL-MCNC: 1 MG/DL
EGFR: 75 ML/MIN/1.73M2
ESTIMATED AVERAGE GLUCOSE: 108 MG/DL
FOLATE SERPL-MCNC: 14.2 NG/ML
GLUCOSE SERPL-MCNC: 84 MG/DL
HBA1C MFR BLD HPLC: 5.4 %
HCT VFR BLD CALC: 46.7 %
HDLC SERPL-MCNC: 86 MG/DL
HGB BLD-MCNC: 15 G/DL
LDLC SERPL CALC-MCNC: 96 MG/DL
MCHC RBC-ENTMCNC: 26.5 PG
MCHC RBC-ENTMCNC: 32.1 GM/DL
MCV RBC AUTO: 82.5 FL
NONHDLC SERPL-MCNC: 111 MG/DL
PLATELET # BLD AUTO: 284 K/UL
POTASSIUM SERPL-SCNC: 4.1 MMOL/L
PROT SERPL-MCNC: 6.9 G/DL
PSA FREE FLD-MCNC: 37 %
PSA FREE SERPL-MCNC: 0.25 NG/ML
PSA SERPL-MCNC: 0.68 NG/ML
RBC # BLD: 5.66 M/UL
RBC # FLD: 14.6 %
SODIUM SERPL-SCNC: 138 MMOL/L
T4 FREE SERPL-MCNC: 1.4 NG/DL
TRIGL SERPL-MCNC: 85 MG/DL
TSH SERPL-ACNC: 1.67 UIU/ML
VIT B12 SERPL-MCNC: 412 PG/ML
WBC # FLD AUTO: 8.89 K/UL

## 2024-12-13 ENCOUNTER — APPOINTMENT (OUTPATIENT)
Dept: UROLOGY | Facility: CLINIC | Age: 84
End: 2024-12-13
Payer: MEDICARE

## 2024-12-13 DIAGNOSIS — N13.8 BENIGN PROSTATIC HYPERPLASIA WITH LOWER URINARY TRACT SYMPMS: ICD-10-CM

## 2024-12-13 DIAGNOSIS — N40.1 BENIGN PROSTATIC HYPERPLASIA WITH LOWER URINARY TRACT SYMPMS: ICD-10-CM

## 2024-12-13 DIAGNOSIS — R97.20 ELEVATED PROSTATE, SPECIFIC ANTIGEN [PSA]: ICD-10-CM

## 2024-12-13 DIAGNOSIS — N52.9 MALE ERECTILE DYSFUNCTION, UNSPECIFIED: ICD-10-CM

## 2024-12-13 PROCEDURE — 99213 OFFICE O/P EST LOW 20 MIN: CPT

## 2024-12-13 PROCEDURE — G2211 COMPLEX E/M VISIT ADD ON: CPT

## 2025-03-06 NOTE — PHYSICAL EXAM
Noted.   [2+] : left 2+ [Alert] : alert [Oriented to Person] : oriented to person [Oriented to Place] : oriented to place [Oriented to Time] : oriented to time [JVD] : no jugular venous distention  [de-identified] : MARIANELA MARY in NAD [de-identified] : soft ND NT

## 2025-04-23 NOTE — ASU PREOP CHECKLIST - SIDE RAILS UP
Called patient who states cardiology notes no bridge. Sending for [facility] to review.    Jacqueline Juárez RN    [facility]      done

## 2025-04-29 ENCOUNTER — INPATIENT (INPATIENT)
Facility: HOSPITAL | Age: 85
LOS: 0 days | Discharge: HOME | DRG: 392 | End: 2025-04-30
Attending: HOSPITALIST | Admitting: STUDENT IN AN ORGANIZED HEALTH CARE EDUCATION/TRAINING PROGRAM
Payer: MEDICARE

## 2025-04-29 ENCOUNTER — RESULT REVIEW (OUTPATIENT)
Age: 85
End: 2025-04-29

## 2025-04-29 VITALS
HEART RATE: 94 BPM | OXYGEN SATURATION: 94 % | TEMPERATURE: 97 F | SYSTOLIC BLOOD PRESSURE: 121 MMHG | DIASTOLIC BLOOD PRESSURE: 70 MMHG | RESPIRATION RATE: 18 BRPM | WEIGHT: 199.96 LBS | HEIGHT: 74 IN

## 2025-04-29 DIAGNOSIS — R11.2 NAUSEA WITH VOMITING, UNSPECIFIED: ICD-10-CM

## 2025-04-29 DIAGNOSIS — Z98.890 OTHER SPECIFIED POSTPROCEDURAL STATES: Chronic | ICD-10-CM

## 2025-04-29 DIAGNOSIS — R09.89 OTHER SPECIFIED SYMPTOMS AND SIGNS INVOLVING THE CIRCULATORY AND RESPIRATORY SYSTEMS: ICD-10-CM

## 2025-04-29 DIAGNOSIS — N40.0 BENIGN PROSTATIC HYPERPLASIA WITHOUT LOWER URINARY TRACT SYMPTOMS: Chronic | ICD-10-CM

## 2025-04-29 LAB
A1C WITH ESTIMATED AVERAGE GLUCOSE RESULT: 5.4 % — SIGNIFICANT CHANGE UP (ref 4–5.6)
ALBUMIN SERPL ELPH-MCNC: 3.3 G/DL — SIGNIFICANT CHANGE UP (ref 3.3–5)
ALP SERPL-CCNC: 62 U/L — SIGNIFICANT CHANGE UP (ref 40–120)
ALT FLD-CCNC: 38 U/L — SIGNIFICANT CHANGE UP (ref 10–45)
ANION GAP SERPL CALC-SCNC: 13 MMOL/L — SIGNIFICANT CHANGE UP (ref 5–17)
APPEARANCE UR: CLEAR — SIGNIFICANT CHANGE UP
APTT BLD: 25.5 SEC — LOW (ref 26.1–36.8)
AST SERPL-CCNC: 39 U/L — SIGNIFICANT CHANGE UP (ref 10–40)
BASOPHILS # BLD AUTO: 0.03 K/UL — SIGNIFICANT CHANGE UP (ref 0–0.2)
BASOPHILS NFR BLD AUTO: 0.4 % — SIGNIFICANT CHANGE UP (ref 0–2)
BILIRUB SERPL-MCNC: 0.2 MG/DL — SIGNIFICANT CHANGE UP (ref 0.2–1.2)
BILIRUB UR-MCNC: NEGATIVE — SIGNIFICANT CHANGE UP
BUN SERPL-MCNC: 20 MG/DL — SIGNIFICANT CHANGE UP (ref 7–23)
CALCIUM SERPL-MCNC: 8.5 MG/DL — SIGNIFICANT CHANGE UP (ref 8.4–10.5)
CHLORIDE SERPL-SCNC: 103 MMOL/L — SIGNIFICANT CHANGE UP (ref 96–108)
CHOLEST SERPL-MCNC: 154 MG/DL — SIGNIFICANT CHANGE UP
CO2 SERPL-SCNC: 25 MMOL/L — SIGNIFICANT CHANGE UP (ref 22–31)
COLOR SPEC: YELLOW — SIGNIFICANT CHANGE UP
CREAT SERPL-MCNC: 0.99 MG/DL — SIGNIFICANT CHANGE UP (ref 0.5–1.3)
D DIMER BLD IA.RAPID-MCNC: 214 NG/ML DDU — SIGNIFICANT CHANGE UP
DIFF PNL FLD: NEGATIVE — SIGNIFICANT CHANGE UP
EGFR: 76 ML/MIN/1.73M2 — SIGNIFICANT CHANGE UP
EGFR: 76 ML/MIN/1.73M2 — SIGNIFICANT CHANGE UP
EOSINOPHIL # BLD AUTO: 0.42 K/UL — SIGNIFICANT CHANGE UP (ref 0–0.5)
EOSINOPHIL NFR BLD AUTO: 4.9 % — SIGNIFICANT CHANGE UP (ref 0–6)
ESTIMATED AVERAGE GLUCOSE: 108 MG/DL — SIGNIFICANT CHANGE UP (ref 68–114)
ETHANOL SERPL-MCNC: 16 MG/DL — HIGH (ref 0–3)
FLUAV AG NPH QL: SIGNIFICANT CHANGE UP
FLUBV AG NPH QL: SIGNIFICANT CHANGE UP
GLUCOSE SERPL-MCNC: 128 MG/DL — HIGH (ref 70–99)
GLUCOSE UR QL: NEGATIVE MG/DL — SIGNIFICANT CHANGE UP
HCT VFR BLD CALC: 39.8 % — SIGNIFICANT CHANGE UP (ref 39–50)
HDLC SERPL-MCNC: 65 MG/DL — SIGNIFICANT CHANGE UP
HGB BLD-MCNC: 13 G/DL — SIGNIFICANT CHANGE UP (ref 13–17)
IMM GRANULOCYTES NFR BLD AUTO: 0.4 % — SIGNIFICANT CHANGE UP (ref 0–0.9)
INR BLD: 0.9 RATIO — SIGNIFICANT CHANGE UP (ref 0.85–1.16)
KETONES UR-MCNC: NEGATIVE MG/DL — SIGNIFICANT CHANGE UP
LDLC SERPL-MCNC: 77 MG/DL — SIGNIFICANT CHANGE UP
LEUKOCYTE ESTERASE UR-ACNC: NEGATIVE — SIGNIFICANT CHANGE UP
LIPID PNL WITH DIRECT LDL SERPL: 77 MG/DL — SIGNIFICANT CHANGE UP
LYMPHOCYTES # BLD AUTO: 2.91 K/UL — SIGNIFICANT CHANGE UP (ref 1–3.3)
LYMPHOCYTES # BLD AUTO: 34.1 % — SIGNIFICANT CHANGE UP (ref 13–44)
MAGNESIUM SERPL-MCNC: 2.1 MG/DL — SIGNIFICANT CHANGE UP (ref 1.6–2.6)
MCHC RBC-ENTMCNC: 26.6 PG — LOW (ref 27–34)
MCHC RBC-ENTMCNC: 32.7 G/DL — SIGNIFICANT CHANGE UP (ref 32–36)
MCV RBC AUTO: 81.4 FL — SIGNIFICANT CHANGE UP (ref 80–100)
MONOCYTES # BLD AUTO: 0.55 K/UL — SIGNIFICANT CHANGE UP (ref 0–0.9)
MONOCYTES NFR BLD AUTO: 6.4 % — SIGNIFICANT CHANGE UP (ref 2–14)
NEUTROPHILS # BLD AUTO: 4.6 K/UL — SIGNIFICANT CHANGE UP (ref 1.8–7.4)
NEUTROPHILS NFR BLD AUTO: 53.8 % — SIGNIFICANT CHANGE UP (ref 43–77)
NITRITE UR-MCNC: NEGATIVE — SIGNIFICANT CHANGE UP
NONHDLC SERPL-MCNC: 89 MG/DL — SIGNIFICANT CHANGE UP
NRBC BLD AUTO-RTO: 0 /100 WBCS — SIGNIFICANT CHANGE UP (ref 0–0)
PCP SPEC-MCNC: SIGNIFICANT CHANGE UP
PH UR: 5 — SIGNIFICANT CHANGE UP (ref 5–8)
PHOSPHATE SERPL-MCNC: 3.6 MG/DL — SIGNIFICANT CHANGE UP (ref 2.5–4.5)
PLATELET # BLD AUTO: 239 K/UL — SIGNIFICANT CHANGE UP (ref 150–400)
POTASSIUM SERPL-MCNC: 3.2 MMOL/L — LOW (ref 3.5–5.3)
POTASSIUM SERPL-SCNC: 3.2 MMOL/L — LOW (ref 3.5–5.3)
PROT SERPL-MCNC: 6.5 G/DL — SIGNIFICANT CHANGE UP (ref 6–8.3)
PROT UR-MCNC: NEGATIVE MG/DL — SIGNIFICANT CHANGE UP
PROTHROM AB SERPL-ACNC: 10.6 SEC — SIGNIFICANT CHANGE UP (ref 9.9–13.4)
RBC # BLD: 4.89 M/UL — SIGNIFICANT CHANGE UP (ref 4.2–5.8)
RBC # FLD: 13.6 % — SIGNIFICANT CHANGE UP (ref 10.3–14.5)
RSV RNA NPH QL NAA+NON-PROBE: SIGNIFICANT CHANGE UP
SARS-COV-2 RNA SPEC QL NAA+PROBE: SIGNIFICANT CHANGE UP
SODIUM SERPL-SCNC: 141 MMOL/L — SIGNIFICANT CHANGE UP (ref 135–145)
SOURCE RESPIRATORY: SIGNIFICANT CHANGE UP
SP GR SPEC: 1.04 — HIGH (ref 1–1.03)
TRIGL SERPL-MCNC: 60 MG/DL — SIGNIFICANT CHANGE UP
TROPONIN I, HIGH SENSITIVITY RESULT: 7.8 NG/L — SIGNIFICANT CHANGE UP
TSH SERPL-MCNC: 0.91 UIU/ML — SIGNIFICANT CHANGE UP (ref 0.36–3.74)
UROBILINOGEN FLD QL: 0.2 MG/DL — SIGNIFICANT CHANGE UP (ref 0.2–1)
WBC # BLD: 8.54 K/UL — SIGNIFICANT CHANGE UP (ref 3.8–10.5)
WBC # FLD AUTO: 8.54 K/UL — SIGNIFICANT CHANGE UP (ref 3.8–10.5)

## 2025-04-29 PROCEDURE — 95816 EEG AWAKE AND DROWSY: CPT | Mod: 26

## 2025-04-29 PROCEDURE — 70450 CT HEAD/BRAIN W/O DYE: CPT | Mod: 26,XU

## 2025-04-29 PROCEDURE — 99222 1ST HOSP IP/OBS MODERATE 55: CPT | Mod: GC

## 2025-04-29 PROCEDURE — 99223 1ST HOSP IP/OBS HIGH 75: CPT

## 2025-04-29 PROCEDURE — 70496 CT ANGIOGRAPHY HEAD: CPT | Mod: 26

## 2025-04-29 PROCEDURE — 93010 ELECTROCARDIOGRAM REPORT: CPT

## 2025-04-29 PROCEDURE — 71045 X-RAY EXAM CHEST 1 VIEW: CPT | Mod: 26

## 2025-04-29 PROCEDURE — 99291 CRITICAL CARE FIRST HOUR: CPT

## 2025-04-29 PROCEDURE — 72125 CT NECK SPINE W/O DYE: CPT | Mod: 26

## 2025-04-29 PROCEDURE — 99222 1ST HOSP IP/OBS MODERATE 55: CPT

## 2025-04-29 PROCEDURE — 99223 1ST HOSP IP/OBS HIGH 75: CPT | Mod: GC

## 2025-04-29 PROCEDURE — 0042T: CPT

## 2025-04-29 PROCEDURE — 70498 CT ANGIOGRAPHY NECK: CPT | Mod: 26

## 2025-04-29 PROCEDURE — 93306 TTE W/DOPPLER COMPLETE: CPT | Mod: 26

## 2025-04-29 RX ORDER — INFLUENZA A VIRUS A/IDAHO/07/2018 (H1N1) ANTIGEN (MDCK CELL DERIVED, PROPIOLACTONE INACTIVATED, INFLUENZA A VIRUS A/INDIANA/08/2018 (H3N2) ANTIGEN (MDCK CELL DERIVED, PROPIOLACTONE INACTIVATED), INFLUENZA B VIRUS B/SINGAPORE/INFTT-16-0610/2016 ANTIGEN (MDCK CELL DERIVED, PROPIOLACTONE INACTIVATED), INFLUENZA B VIRUS B/IOWA/06/2017 ANTIGEN (MDCK CELL DERIVED, PROPIOLACTONE INACTIVATED) 15; 15; 15; 15 UG/.5ML; UG/.5ML; UG/.5ML; UG/.5ML
0.5 INJECTION, SUSPENSION INTRAMUSCULAR ONCE
Refills: 0 | Status: DISCONTINUED | OUTPATIENT
Start: 2025-04-29 | End: 2025-04-30

## 2025-04-29 RX ORDER — SODIUM CHLORIDE 9 G/1000ML
1000 INJECTION, SOLUTION INTRAVENOUS
Refills: 0 | Status: DISCONTINUED | OUTPATIENT
Start: 2025-04-29 | End: 2025-04-30

## 2025-04-29 RX ORDER — ENOXAPARIN SODIUM 100 MG/ML
40 INJECTION SUBCUTANEOUS EVERY 24 HOURS
Refills: 0 | Status: DISCONTINUED | OUTPATIENT
Start: 2025-04-29 | End: 2025-04-30

## 2025-04-29 RX ORDER — ONDANSETRON HCL/PF 4 MG/2 ML
4 VIAL (ML) INJECTION EVERY 6 HOURS
Refills: 0 | Status: DISCONTINUED | OUTPATIENT
Start: 2025-04-29 | End: 2025-04-30

## 2025-04-29 RX ORDER — ONDANSETRON HCL/PF 4 MG/2 ML
4 VIAL (ML) INJECTION ONCE
Refills: 0 | Status: COMPLETED | OUTPATIENT
Start: 2025-04-29 | End: 2025-04-29

## 2025-04-29 RX ORDER — ASPIRIN 325 MG
81 TABLET ORAL DAILY
Refills: 0 | Status: DISCONTINUED | OUTPATIENT
Start: 2025-04-29 | End: 2025-04-30

## 2025-04-29 RX ORDER — ATORVASTATIN CALCIUM 80 MG/1
80 TABLET, FILM COATED ORAL AT BEDTIME
Refills: 0 | Status: DISCONTINUED | OUTPATIENT
Start: 2025-04-29 | End: 2025-04-30

## 2025-04-29 RX ADMIN — ATORVASTATIN CALCIUM 80 MILLIGRAM(S): 80 TABLET, FILM COATED ORAL at 21:07

## 2025-04-29 RX ADMIN — SODIUM CHLORIDE 75 MILLILITER(S): 9 INJECTION, SOLUTION INTRAVENOUS at 05:56

## 2025-04-29 RX ADMIN — Medication 4 MILLIGRAM(S): at 03:41

## 2025-04-29 RX ADMIN — SODIUM CHLORIDE 75 MILLILITER(S): 9 INJECTION, SOLUTION INTRAVENOUS at 21:08

## 2025-04-29 RX ADMIN — ATORVASTATIN CALCIUM 80 MILLIGRAM(S): 80 TABLET, FILM COATED ORAL at 06:22

## 2025-04-29 RX ADMIN — Medication 100 MILLIEQUIVALENT(S): at 08:31

## 2025-04-29 RX ADMIN — Medication 1000 MILLILITER(S): at 03:41

## 2025-04-29 RX ADMIN — Medication 100 MILLIEQUIVALENT(S): at 06:17

## 2025-04-29 RX ADMIN — ENOXAPARIN SODIUM 40 MILLIGRAM(S): 100 INJECTION SUBCUTANEOUS at 10:29

## 2025-04-29 RX ADMIN — Medication 100 MILLIEQUIVALENT(S): at 10:29

## 2025-04-29 RX ADMIN — Medication 81 MILLIGRAM(S): at 06:22

## 2025-04-29 RX ADMIN — Medication 1000 MILLILITER(S): at 04:52

## 2025-04-29 RX ADMIN — Medication 100 MILLIEQUIVALENT(S): at 04:34

## 2025-04-29 NOTE — ED ADULT TRIAGE NOTE - CHIEF COMPLAINT QUOTE
BIBEMS from home s/p fall. Per EMS pt. felt decreased leg sensation and pt. fell in bathroom. Denies LOC or blood thinner use. Pt. given 4mg zofran prior to arrival for nausea and vomiting en route per EMS.  prior to arrival. Pt. admits to drinking whiskey and wine earlier last night.

## 2025-04-29 NOTE — SWALLOW BEDSIDE ASSESSMENT ADULT - SLP REFERRING PHYSICIAN
Pain Management Consult- Initial Assessment      Subjective:     85 year old female , Pain Service consulted to assist with managing  acute pain s/p ORIF Left distal humerus fracture and cubital tunnel release on 1-4-24    Pain Assessment  Patient reports pain is severe, prefers not to rate 0-10 \"reports she is not going home like this\"  Location: L elbow and wrist  Duration: post operative   Quality of pain: \"just hurts\"  Aggravating factors: none  Alleviating factors: none:  Ineffective factors : Nerve block  Functional status: worked with PT today  LPMP has been reviewed    PRN opioid requirement in the past 24 hours  Tramadol 50 mg - 1 dose  Patient Active Problem List    Diagnosis Date Noted    Left supracondylar humerus fracture, closed, initial encounter 01/01/2024     Priority: Low    Altered mental status 12/26/2023     Priority: Low    Fall at home, initial encounter 08/26/2023     Priority: Low    Seizure disorder (CMD) 04/12/2023     Priority: Low    Seizure-like activity (CMD) 03/14/2023     Priority: Low    Dementia (CMD) 03/14/2023     Priority: Low    Altered mental status, unspecified altered mental status type 07/27/2022     Priority: Low    Primary insomnia 05/31/2022     Priority: Low     Past Medical History:   Diagnosis Date    Seizures (CMD)       Past Surgical History:   Procedure Laterality Date    Spinal fusion        Medications Prior to Admission   Medication Sig Dispense Refill    [DISCONTINUED] levETIRAcetam (KepPRA) 500 MG tablet Take 500 mg by mouth at bedtime as needed (pt reports takes for pain).      BIOTIN PO Take 1 tablet by mouth daily.      levetiracetam (KEPPRA XR) 500 MG 24 hr tablet Take 2 tablets by mouth daily. 90 tablet 3    polyethylene glycol (MIRALAX) 17 g packet Take 17 g by mouth daily. Stir and dissolve powder in any 4 to 8 ounces of beverage, then drink. 30 each 0    traZODone (DESYREL) 50 MG tablet Take 1 tablet by mouth at bedtime as needed for Sleep. 30 tablet 0      ALLERGIES:  No Known Allergies   Social History     Tobacco Use    Smoking status: Never    Smokeless tobacco: Never   Substance Use Topics    Alcohol use: Not Currently      History reviewed. No pertinent family history.   Objective:     Vitals:    01/05/24 1049   BP: 92/58   Pulse: 82   Resp: 16   Temp: 98.4 °F (36.9 °C)     Physical Exam  Constitutional:       General: She is not in acute distress.  Pulmonary:      Effort: Pulmonary effort is normal. No respiratory distress.   Musculoskeletal:      Comments: L arm with sling   Skin:     General: Skin is warm and dry.   Neurological:      Mental Status: She is alert and oriented to person, place, and time.   Psychiatric:         Mood and Affect: Mood normal.         Behavior: Behavior normal.        Data Review:   Recent Labs   Lab 01/05/24 0552 01/04/24  0555 01/03/24  0734 01/01/24  1300   SODIUM 137 139 138 140   POTASSIUM 4.5 4.6 4.2 3.9   CHLORIDE 109 113* 112* 111*   CO2 23 23 22 24   BUN 22* 35* 36* 21*   CREATININE 0.79 0.82 0.91 0.98*   CALCIUM 9.4 8.6 8.7 9.5   MG 2.0  --   --   --    BILIRUBIN  --   --   --  0.7   ALKPT  --   --   --  100   GPT  --   --   --  19   AST  --   --   --  17   GLUCOSE 171* 101* 106* 136*     No results found  Recent Labs   Lab 01/05/24  0552 01/04/24  0555 01/03/24  0734 01/01/24  1300   WBC 8.0 4.3 5.1 9.9   RBC 3.96* 3.63* 3.68* 4.49   HGB 12.4 11.4* 11.5* 13.9   HCT 38.5 35.5* 35.7* 42.9    178 175 196     Imaging:   FL INTRAOPERATIVE C ARM NO REPORT   Final Result      CT CERVICAL SPINE WO CONTRAST   Final Result      1.   Postoperative changes compatible with occipitocervical posterior   fusion extending from the occiput to the C4 level on the right and C3 level   on the left. No evidence of hardware loosening.       2.   Interval evolution of acute hyperextension injury at C5-C6 with   previously visualized ligamentous disruption on prior MRI 8/26/2023. Mild   widening along the anterior disc space and trace  widening of the facet   joints at C5-C6 has improved from prior examination. No definite fracture   identified.      Electronically Signed by: HERNAN HINSON MD    Signed on: 1/3/2024 7:43 AM    Workstation ID: [id number]      CT ELBOW WO CONTRAST LEFT   Final Result   1.   Transverse fracture of the distal humerus metaphysis is mildly   impacted and mildly displaced anteriorly 2 mm. No involvement of the   articular surface is identified.   2.   Osseous demineralization. Other findings discussed above.            Electronically Signed by: MAURISIO OSBORNE MD    Signed on: 1/2/2024 7:29 AM    Workstation ID: [id number]      CT HEAD WO CONTRAST   Final Result      No acute intracranial process.      Electronically Signed by: LINO KIM MD    Signed on: 1/1/2024 12:51 PM    Workstation ID: [id number]      XR FOREARM 2 VIEWS LEFT   Final Result   Acute displaced supracondylar fracture of the left distal humerus.      Electronically Signed by: CRISTI COLEMAN MD    Signed on: 1/1/2024 12:54 PM    Workstation ID: [id number]      XR ELBOW 2 VIEWS LEFT   Final Result   Acute displaced supracondylar fracture of the left distal humerus.      Electronically Signed by: CRISTI COLEMAN MD    Signed on: 1/1/2024 12:54 PM    Workstation ID: [id number]      XR HUMERUS 2 VIEWS LEFT   Final Result   Acute displaced supracondylar fracture of the left distal humerus.      Electronically Signed by: CRISTI COLEMAN MD    Signed on: 1/1/2024 12:54 PM    Workstation ID: [id number]         Assessment and Plan:   85 year old female , Pain Service consulted to assist with managing  acute pain s/p ORIF Left distal humerus fracture and cubital tunnel release on 1-4-24    Pain reduction- inadequate     Opioid naive    Recommendations  DC Tramadol  Norco 7.5 mg q 4 hrs PRN  Naproxen 500 q 12 x 3 days  Scheduled bowel regimen with Senna S and Miralax  Messaged and /or spoke with clinician to inform of recommendations-   Elle  Primary RN informed of plan  Contact Pain Service with questions if needed at [phone] or [phone]      Elpidio Ivey CNP  1/5/2024 2:55 PM        Shelby Conrad

## 2025-04-29 NOTE — ED ADULT NURSE REASSESSMENT NOTE - NS ED NURSE REASSESS COMMENT FT1
Pt. NIH improved from initial evaluation per MD Mares with writer RN at bedside. Documented in flow sheet. Pt. remains NPO at this time for continued nausea and vomiting. Pt. handed off to receiving RN at this time. Nursing care to be continued by Souleymane EDMOND.

## 2025-04-29 NOTE — OCCUPATIONAL THERAPY INITIAL EVALUATION ADULT - GENERAL OBSERVATIONS, REHAB EVAL
Pt received semi-supine in bed, A&Ox4, family at bedside, agreeable to OT IE without complaints. Tolerated IE well, performing bed mobliity with independence, fucntional mobility without AD with supervision. Pt left as found, NAD, all lines intact, all safety measures in place, all needs met.

## 2025-04-29 NOTE — SWALLOW BEDSIDE ASSESSMENT ADULT - SWALLOW EVAL: DIAGNOSIS
Patient currently presents with oropharyngeal swallow function WFL for regular solids and thin liquids. Patient participated in PO trials of soft and bite sized solids, regular solids and thin liquids. During PO trials of soft and bite sized solids and regular solids patient self fed with good recognition of feeding task, presented with strong labial seal for teaspoon stripping, functional rotary chew and full clearance of PO s/p swallow. Pharyngeal motor response appreciated upon palpation. Patient tolerated PO trials of thin liquids via successive straw sips without difficulty. Recommend patient continue with diet consistency of regular solids and thin liquids.

## 2025-04-29 NOTE — CONSULT NOTE ADULT - SUBJECTIVE AND OBJECTIVE BOX
Neurology consult    DEBORA [id number]    HPI:  83 yo male with pmhx of BPH, HLD, HTN, hepatic artery aneurysm, melanoma presenting with AMS, weakness, and vomiting. History provided both by patient and wife Adam at bedside. They had a late dinner and pt had a scotch around 4:30pm, followed by another scotch at 6:30pm and half a glass of wine with dinner which was pasta and broccoli. Went to bed around 10pm - no symptoms at this time. Pt remembers waking up around 2am feeling "sick" and knelt down on the bathroom floor to vomit in the toilet. Denies falling. Unsure if he lost consciousness but remembers everything was "dark" and not sure if he had any vision changes. Arms and leg felt weak at the time. Adam noticed that pt had "garbled speech" and was having both slurred speech and difficulty getting out words. No facial droop observed. Denies chest pain, SOB, diarrhea, dysuria. No known sick contacts. Denies history of stroke/TIA, MI, DVT/PE, seizures, and alcohol withdrawal.    ED course-  Afebrile, HR 72-94, -124/70-86, RR 18-20, SpO2 94% > 91%, now 99% on 3L NC  NIHSS 12, improved to 0  No leukocytosis, K 3.2, trop 7.8, EtOH level 16  CT/CTA head neck unremarkable, CXR unremarkable  S/p 1L NS, IV Zofran 4 mg, IV KCl 10 mEq (2025 04:44)    Per chart review, hepatic artery aneurysm, hence clopidogrel and AC were stopped, only on ASA       MEDICATIONS    aspirin  chewable 81 milliGRAM(s) Oral daily  atorvastatin 80 milliGRAM(s) Oral at bedtime  enoxaparin Injectable 40 milliGRAM(s) SubCutaneous every 24 hours  influenza  Vaccine (HIGH DOSE) 0.5 milliLiter(s) IntraMuscular once  lactated ringers. 1000 milliLiter(s) IV Continuous <Continuous>  ondansetron Injectable 4 milliGRAM(s) IV Push every 6 hours PRN  potassium chloride  10 mEq/100 mL IVPB 10 milliEquivalent(s) IV Intermittent every 1 hour         Family history: No history of dementia, strokes, or seizures   FAMILY HISTORY:  FH: colon cancer (Mother)    FH: melanoma (Sibling)      SOCIAL HISTORY -- No history of tobacco or alcohol use     Allergies    No Known Allergies    Intolerances        Height (cm): 188 ( @ 02:43)  Weight (kg): 71 ( @ 03:21)  BMI (kg/m2): 20.1 ( @ 03:21)    Vital Signs Last 24 Hrs  T(C): 36.3 (2025 08:01), Max: 36.9 (2025 06:26)  T(F): 97.4 (2025 08:01), Max: 98.4 (2025 06:26)  HR: 76 (2025 08:36) (68 - 94)  BP: 126/78 (2025 08:36) (114/88 - 126/78)  BP(mean): 88 (2025 03:30) (88 - 92)  RR: 18 (2025 08:01) (17 - 21)  SpO2: 98% (2025 08:36) (91% - 99%)    Parameters below as of 2025 08:36  Patient On (Oxygen Delivery Method): room air          REVIEW OF SYSTEMS:    Constitutional: No fever, chills, fatigue, weakness  Eyes: no eye pain, visual disturbances, or discharge  ENT:  No difficulty hearing, tinnitus, vertigo; No sinus or throat pain  Neck: No pain or stiffness  Respiratory: No cough, dyspnea, wheezing   Cardiovascular: No chest pain, palpitations,   Gastrointestinal: No abdominal or epigastric pain. No nausea, vomiting  No diarrhea or constipation.   Genitourinary: No dysuria, frequency, hematuria or incontinence  Neurological: No headaches, lightheadedness, vertigo, numbness or tremors  Psychiatric: No depression, anxiety, mood swings or difficulty sleeping  Musculoskeletal: No joint pain or swelling; No muscle, back or extremity pain  Skin: No itching, burning, rashes or lesions   Lymph Nodes: No enlarged glands  Endocrine: No heat or cold intolerance; No hair loss, No h/o diabetes or thyroid dysfunction  Allergy and Immunologic: No hives or eczema    On Neurological Examination:    Mental Status - Patient is alert, awake, oriented X3. Confused Dementia Lethargic .     Follows commands well and able to answer questions appropriately. Mood and affect  normal  Follow simple commands  Follow complex commands  Does not follow commands    Speech -   Fluent    Dysarthria  Aphasia                              Cranial Nerves - Pupils 3 mm equal and reactive to light,   extraocular eye movements intact.     Motor Exam -   Right upper  Left upper  Right lower  Left lower     With stimuli positive movement of all 4 extremities    Muscle tone - is normal all over. No asymmetry is seen.      Sensory    Bilateral intact to light touch    Gait -  normal  ataxia     GENERAL Exam:     Nontoxic , No Acute Distress   	  HEENT:  normocephalic, atraumatic  		  LUNGS:	Clear bilaterally  No Wheeze  Decreased bilaterally  	  HEART:	 Normal S1S2   No murmur RRR        	  GI/ ABDOMEN:  Soft  Non tender    EXTREMITIES:   No Edema  No Clubbing  No Cyanosis No Edema    MUSCULOSKELETAL: Normal Range of Motion  	   SKIN:      Normal   No Ecchymosis               LABS:  CBC Full  -  ( 2025 03:05 )  WBC Count : 8.54 K/uL  RBC Count : 4.89 M/uL  Hemoglobin : 13.0 g/dL  Hematocrit : 39.8 %  Platelet Count - Automated : 239 K/uL  Mean Cell Volume : 81.4 fl  Mean Cell Hemoglobin : 26.6 pg  Mean Cell Hemoglobin Concentration : 32.7 g/dL  Auto Neutrophil # : x  Auto Lymphocyte # : x  Auto Monocyte # : x  Auto Eosinophil # : x  Auto Basophil # : x  Auto Neutrophil % : x  Auto Lymphocyte % : x  Auto Monocyte % : x  Auto Eosinophil % : x  Auto Basophil % : x    Urinalysis Basic - ( 2025 06:00 )    Color: Yellow / Appearance: Clear / S.045 / pH: x  Gluc: x / Ketone: Negative mg/dL  / Bili: Negative / Urobili: 0.2 mg/dL   Blood: x / Protein: Negative mg/dL / Nitrite: Negative   Leuk Esterase: Negative / RBC: x / WBC x   Sq Epi: x / Non Sq Epi: x / Bacteria: x          141  |  103  |  20  ----------------------------<  128[H]  3.2[L]   |  25  |  0.99    Ca    8.5      2025 03:05  Phos  3.6       Mg     2.1         TPro  6.5  /  Alb  3.3  /  TBili  0.2  /  DBili  x   /  AST  39  /  ALT  38  /  AlkPhos  62            ACC: [id number] EXAM:  CT ANGIO NECK STROKE PROTCL IC   ORDERED BY: JEAN MARIE HERNANDEZ     ACC: [id number] EXAM:  CT BRAIN PERFUSION MAPS STROKE   ORDERED BY: JEAN MARIE   DAVID     ACC: [id number] EXAM:  CT ANGIO BRAIN STROKE [facility] IC   ORDERED BY: JEAN MARIE HERNANDEZ     PROCEDURE DATE:  2025          INTERPRETATION:  CLINICAL INFORMATION: Stroke Code    COMPARISON: None.    CONTRAST:  IV Contrast: Omnipaque 350  110 cc administered  40 cc discarded  .    TECHNIQUE: CT perfusion and CTA of the head and neck were performed   following the intravenous administration of IV contrast. MIP   reconstructions were performed on a separate workstation and reviewed.   RAPID software was utilized for perfusion analysis.    FINDINGS:    CT PERFUSION:    TECHNICAL LIMITATIONS:None.    CBF<30%/CORE INFARCTION: 0 mL  TMAX>6s/UNDERPERFUSED TISSUE: 0 mL  MISMATCH VOLUME/TISSUE AT RISK: 0 mL  MISMATCH RATIO: None.    MISCELLANEOUS: None.      CTA NECK:    AORTIC ARCH AND VISUALIZED GREAT VESSELS: Mild atherosclerotic changes.   No significant stenosis.    RIGHT:  COMMON CAROTID ARTERY: No significant stenosis to the carotid bifurcation.  INTERNAL CAROTID ARTERY: No significant stenosis based on NASCET criteria.  VERTEBRAL ARTERY: Normal in course and caliber to the intracranial   circulation.    LEFT:  COMMON CAROTID ARTERY: No significant stenosis to the carotid bifurcation.  INTERNAL CAROTID ARTERY: No significant stenosis based on NASCET criteria.  VERTEBRAL ARTERY: Normal in course and caliber to the intracranial   circulation. Left dominant vertebral system.    VISUALIZED LUNGS: Biapical scarring.    MISCELLANEOUS: Multilevel degenerative changes in the spine.    CAROTID STENOSIS REFERENCE: Percent (%) stenosis is expressed in terms of   NASCET Criteria. (NASCET = 100x1-(N/D)). N=greatest narrowing. D=normal   distal diameter - MILD = <50% stenosis. - MODERATE = 50-69% stenosis. -   SEVERE = 70-89% stenosis. - HAIRLINE/CRITICAL = 90-99% stenosis. -   OCCLUDED = 100% stenosis.      CTA HEAD:    INTERNAL CAROTID ARTERIES: Bilateral petrous, precavernous, cavernous,   and supraclinoid regions are patent without significant stenosis. Mild   atherosclerotic calcifications of the bilateral carotid siphons.    [place]: No aneurysm identified. Tiny aneurysms can be beyond   the resolution of CTA technique.    ANTERIOR CEREBRAL ARTERIES: No significant stenosis or occlusion.  MIDDLE CEREBRAL ARTERIES: No significant stenosis or occlusion.  POSTERIOR CEREBRAL ARTERIES: No significant stenosis or occlusion.    DISTAL VERTEBRAL / BASILAR ARTERIES: No significant stenosis or occlusion.    VENOUS STRUCTURES: Visualized superficial and deep venous structures   appear patent.    MISCELLANEOUS: No other vascular abnormality is seen.      IMPRESSION:    CT PERFUSION:  Technical limitations: None.    Core infarction: 0 ml  Penumbra / tissue at risk for active ischemia: 0 ml    CTA NECK:  No evidence of significant stenosis or occlusion.    CTA HEAD:  No large vessel occlusion, significant stenosis orvascular abnormality   identified.      JENA WRIGHT MD; Attending Radiologist  This document has been electronically signed. 2025  3:39AM      ACC: [id number] EXAM:  CT BRAIN STROKE PROTOCOL   ORDERED BY: JEAN MARIE HERNANDEZ     PROCEDURE DATE:  2025          INTERPRETATION:  CLINICAL INFORMATION: Stroke Code    COMPARISON: None.    CONTRAST:  IV Contrast: NONE  .    TECHNIQUE:  Serial axial images were obtained from the skull base to the   vertex using multi-slice helical technique. Sagittal and coronal   reformats were obtained.    FINDINGS:    VENTRICLES AND SULCI: Age appropriate involutional changes.  INTRA-AXIAL: No mass effect, acute hemorrhage, or midline shift.  There   are periventricular and subcortical white matter hypodensities,   consistent with microvascular type changes.  EXTRA-AXIAL: No mass or fluid collection. Basal cisterns are normal in   appearance.    VISUALIZED SINUSES:  Left lens replacement.  TYMPANOMASTOID CAVITIES:  Clear.  VISUALIZED ORBITS: Normal.  CALVARIUM: Intact.    MISCELLANEOUS: None.      IMPRESSION:  No acute intracranial hemorrhage, mass effect, or midline shift.    Findings were discussed withDrTina HERNANDEZ [phone] 2025 3:10 AM by   Dr. WRIGHT with read back confirmation.    --- End of Report ---            JENA WRIGHT MD; Attending Radiologist  This document has been electronically signed. 2025  3:13AM       Neurology consult    DEBORA [id number]    HPI:  85 yo male with pmhx of BPH, HLD, HTN, hepatic artery aneurysm, melanoma presenting with AMS, weakness, and vomiting. History provided both by patient and wife Adam at bedside. They had a late dinner and pt had a scotch around 4:30pm, followed by another scotch at 6:30pm and half a glass of wine with dinner which was pasta and broccoli. Went to bed around 10pm - no symptoms at this time. Pt remembers waking up around 2am feeling "sick" and knelt down on the bathroom floor to vomit in the toilet. Denies falling. Unsure if he lost consciousness but remembers everything was "dark" and not sure if he had any vision changes. Arms and leg felt weak at the time. Adam noticed that pt had "garbled speech" and was having both slurred speech and difficulty getting out words. No facial droop observed. Denies chest pain, SOB, diarrhea, dysuria. No known sick contacts. Denies history of stroke/TIA, MI, DVT/PE, seizures, and alcohol withdrawal.    ED course-  Afebrile, HR 72-94, -124/70-86, RR 18-20, SpO2 94% > 91%, now 99% on 3L NC  NIHSS 12, improved to 0  No leukocytosis, K 3.2, trop 7.8, EtOH level 16  CT/CTA head neck unremarkable, CXR unremarkable  S/p 1L NS, IV Zofran 4 mg, IV KCl 10 mEq (2025 04:44)    25  pt was seen and examined at bedside. He is back to normal self.     MEDICATIONS    aspirin  chewable 81 milliGRAM(s) Oral daily  atorvastatin 80 milliGRAM(s) Oral at bedtime  enoxaparin Injectable 40 milliGRAM(s) SubCutaneous every 24 hours  influenza  Vaccine (HIGH DOSE) 0.5 milliLiter(s) IntraMuscular once  lactated ringers. 1000 milliLiter(s) IV Continuous <Continuous>  ondansetron Injectable 4 milliGRAM(s) IV Push every 6 hours PRN  potassium chloride  10 mEq/100 mL IVPB 10 milliEquivalent(s) IV Intermittent every 1 hour         Family history: No history of dementia, strokes, or seizures   FAMILY HISTORY:  FH: colon cancer (Mother)    FH: melanoma (Sibling)      SOCIAL HISTORY -- No history of tobacco or alcohol use     Allergies    No Known Allergies    Intolerances        Height (cm): 188 ( @ 02:43)  Weight (kg): 71 ( @ 03:21)  BMI (kg/m2): 20.1 ( @ 03:21)    Vital Signs Last 24 Hrs  T(C): 36.3 (2025 08:01), Max: 36.9 (2025 06:26)  T(F): 97.4 (2025 08:01), Max: 98.4 (2025 06:26)  HR: 76 (2025 08:36) (68 - 94)  BP: 126/78 (2025 08:36) (114/88 - 126/78)  BP(mean): 88 (2025 03:30) (88 - 92)  RR: 18 (2025 08:01) (17 - 21)  SpO2: 98% (2025 08:36) (91% - 99%)    Parameters below as of 2025 08:36  Patient On (Oxygen Delivery Method): room air          REVIEW OF SYSTEMS:    Constitutional: No fever, chills, fatigue, weakness  Eyes: no eye pain, visual disturbances, or discharge  ENT:  No difficulty hearing, tinnitus, vertigo; No sinus or throat pain  Neck: No pain or stiffness  Respiratory: No cough, dyspnea, wheezing   Cardiovascular: No chest pain, palpitations,   Gastrointestinal: No abdominal or epigastric pain. No nausea, vomiting  No diarrhea or constipation.   Genitourinary: No dysuria, frequency, hematuria or incontinence  Neurological: No headaches, lightheadedness, vertigo, numbness or tremors  Psychiatric: No depression, anxiety, mood swings or difficulty sleeping  Musculoskeletal: No joint pain or swelling; No muscle, back or extremity pain  Skin: No itching, burning, rashes or lesions   Lymph Nodes: No enlarged glands  Endocrine: No heat or cold intolerance; No hair loss, No h/o diabetes or thyroid dysfunction  Allergy and Immunologic: No hives or eczema    On Neurological Examination:    Mental Status - Patient is alert, awake, oriented X3.   Follows commands well and able to answer questions appropriately. Mood and affect  normal  Speech -   Fluent, no Aphasia                              Cranial Nerves - Pupils 3 mm equal and reactive to light, extraocular eye movements intact, face symmetric, and normal sensation, tongue midline.    Motor Exam - 5/5 no drifting    Muscle tone - is normal all over. No asymmetry is seen.      Sensory    Bilateral intact to light touch    normal MANSI, FTN, HTN.    Gait -  deferred            LABS:  CBC Full  -  ( 2025 03:05 )  WBC Count : 8.54 K/uL  RBC Count : 4.89 M/uL  Hemoglobin : 13.0 g/dL  Hematocrit : 39.8 %  Platelet Count - Automated : 239 K/uL  Mean Cell Volume : 81.4 fl  Mean Cell Hemoglobin : 26.6 pg  Mean Cell Hemoglobin Concentration : 32.7 g/dL  Auto Neutrophil # : x  Auto Lymphocyte # : x  Auto Monocyte # : x  Auto Eosinophil # : x  Auto Basophil # : x  Auto Neutrophil % : x  Auto Lymphocyte % : x  Auto Monocyte % : x  Auto Eosinophil % : x  Auto Basophil % : x    Urinalysis Basic - ( 2025 06:00 )    Color: Yellow / Appearance: Clear / S.045 / pH: x  Gluc: x / Ketone: Negative mg/dL  / Bili: Negative / Urobili: 0.2 mg/dL   Blood: x / Protein: Negative mg/dL / Nitrite: Negative   Leuk Esterase: Negative / RBC: x / WBC x   Sq Epi: x / Non Sq Epi: x / Bacteria: x          141  |  103  |  20  ----------------------------<  128[H]  3.2[L]   |  25  |  0.99    Ca    8.5      2025 03:05  Phos  3.6       Mg     2.1         TPro  6.5  /  Alb  3.3  /  TBili  0.2  /  DBili  x   /  AST  39  /  ALT  38  /  AlkPhos  62            ACC: [id number] EXAM:  CT ANGIO NECK STROKE PROTCL IC   ORDERED BY: JEAN MARIE HERNANDEZ     ACC: [id number] EXAM:  CT BRAIN PERFUSION MAPS STROKE   ORDERED BY: JEAN MARIE HERNANDEZ     ACC: [id number] EXAM:  CT ANGIO BRAIN STROKE PROTC IC   ORDERED BY: JEAN MARIE HERNANDEZ     PROCEDURE DATE:  2025          INTERPRETATION:  CLINICAL INFORMATION: Stroke Code    COMPARISON: None.    CONTRAST:  IV Contrast: Omnipaque 350  110 cc administered  40 cc discarded  .    TECHNIQUE: CT perfusion and CTA of the head and neck were performed   following the intravenous administration of IV contrast. MIP   reconstructions were performed on a separate workstation and reviewed.   RAPID software was utilized for perfusion analysis.    FINDINGS:    CT PERFUSION:    TECHNICAL LIMITATIONS:None.    CBF<30%/CORE INFARCTION: 0 mL  TMAX>6s/UNDERPERFUSED TISSUE: 0 mL  MISMATCH VOLUME/TISSUE AT RISK: 0 mL  MISMATCH RATIO: None.    MISCELLANEOUS: None.      CTA NECK:    AORTIC ARCH AND VISUALIZED GREAT VESSELS: Mild atherosclerotic changes.   No significant stenosis.    RIGHT:  COMMON CAROTID ARTERY: No significant stenosis to the carotid bifurcation.  INTERNAL CAROTID ARTERY: No significant stenosis based on NASCET criteria.  VERTEBRAL ARTERY: Normal in course and caliber to the intracranial   circulation.    LEFT:  COMMON CAROTID ARTERY: No significant stenosis to the carotid bifurcation.  INTERNAL CAROTID ARTERY: No significant stenosis based on NASCET criteria.  VERTEBRAL ARTERY: Normal in course and caliber to the intracranial   circulation. Left dominant vertebral system.    VISUALIZED LUNGS: Biapical scarring.    MISCELLANEOUS: Multilevel degenerative changes in the spine.    CAROTID STENOSIS REFERENCE: Percent (%) stenosis is expressed in terms of   NASCET Criteria. (NASCET = 100x1-(N/D)). N=greatest narrowing. D=normal   distal diameter - MILD = <50% stenosis. - MODERATE = 50-69% stenosis. -   SEVERE = 70-89% stenosis. - HAIRLINE/CRITICAL = 90-99% stenosis. -   OCCLUDED = 100% stenosis.      CTA HEAD:    INTERNAL CAROTID ARTERIES: Bilateral petrous, precavernous, cavernous,   and supraclinoid regions are patent without significant stenosis. Mild   atherosclerotic calcifications of the bilateral carotid siphons.    [place]: No aneurysm identified. Tiny aneurysms can be beyond   the resolution of CTA technique.    ANTERIOR CEREBRAL ARTERIES: No significant stenosis or occlusion.  MIDDLE CEREBRAL ARTERIES: No significant stenosis or occlusion.  POSTERIOR CEREBRAL ARTERIES: No significant stenosis or occlusion.    DISTAL VERTEBRAL / BASILAR ARTERIES: No significant stenosis or occlusion.    VENOUS STRUCTURES: Visualized superficial and deep venous structures   appear patent.    MISCELLANEOUS: No other vascular abnormality is seen.      IMPRESSION:    CT PERFUSION:  Technical limitations: None.    Core infarction: 0 ml  Penumbra / tissue at risk for active ischemia: 0 ml    CTA NECK:  No evidence of significant stenosis or occlusion.    CTA HEAD:  No large vessel occlusion, significant stenosis orvascular abnormality   identified.      JENA WRIGHT MD; Attending Radiologist  This document has been electronically signed. 2025  3:39AM      ACC: [id number] EXAM:  CT BRAIN STROKE PROTOCOL   ORDERED BY: JEAN MARIE HERNANDEZ     PROCEDURE DATE:  2025          INTERPRETATION:  CLINICAL INFORMATION: Stroke Code    COMPARISON: None.    CONTRAST:  IV Contrast: NONE  .    TECHNIQUE:  Serial axial images were obtained from the skull base to the   vertex using multi-slice helical technique. Sagittal and coronal   reformats were obtained.    FINDINGS:    VENTRICLES AND SULCI: Age appropriate involutional changes.  INTRA-AXIAL: No mass effect, acute hemorrhage, or midline shift.  There   are periventricular and subcortical white matter hypodensities,   consistent with microvascular type changes.  EXTRA-AXIAL: No mass or fluid collection. Basal cisterns are normal in   appearance.    VISUALIZED SINUSES:  Left lens replacement.  TYMPANOMASTOID CAVITIES:  Clear.  VISUALIZED ORBITS: Normal.  CALVARIUM: Intact.    MISCELLANEOUS: None.      IMPRESSION:  No acute intracranial hemorrhage, mass effect, or midline shift.    Findings were discussed withDrTina HERNANDEZ [id number] 2025 3:10 AM by   Dr. WRIGHT with read back confirmation.    --- End of Report ---            JENA WRIGHT MD; Attending Radiologist  This document has been electronically signed. 2025  3:13AM

## 2025-04-29 NOTE — ED ADULT NURSE REASSESSMENT NOTE - NS ED NURSE REASSESS COMMENT FT1
MD Mares called to bedside for concerns for stroke like symptoms. EMS did not pre notify as code stroke. MD Mares endorsed calling Stroke code over head after neuro assessment. Pt. obtunded and vomiting. Minimally able to follow commands. Pt. immediately brought over to CT scan. MD Mares called to bedside for concerns for stroke like symptoms. EMS did not pre notify as code stroke. MD Mares endorsed calling Stroke code over head after neuro assessment. Pt. obtunded and vomiting. Minimally able to follow commands. Pt. immediately brought over to CT scan. Per wife at bedside pt. was slurring words prior to fall. With further interview prompted MD rapid assessment in room with wife's details about what transpired prior to arrival. Last known to be at baseline prior to bed time at 10pm yesterday.

## 2025-04-29 NOTE — H&P ADULT - ASSESSMENT
85 yo male with pmhx of BPH, HLD, HTN, melanoma presenting with AMS, weakness, and vomiting. Admitted for:    #Weakness, AMS, nausea and vomiting 2/2 CVA vs seizures vs EtOH withdrawal vs infection  -Admit to tele  -Code stroke, LNK 4/28 @10pm, NIHSS 12, outside of window for TNK  -CT/CTA head neck unremarkable  -Bedside dysphagia screen failed, likely due to nausea -> Zofran prn  -Continue home ASA and start atorvastatin 80 mg  -LR @ 75cc/hr  -TTE ordered  -EEG ordered  -Follow up a1c and lipid panel  -Neurochecks q4h  -Allow permissive HTN  -Fall and aspiration precautions  -Speech and swallow consult  -PT/OT/PMR evaluation  -Neuro consult  -Cardio consult  -No hx of EtOH withdrawal, will hold off on CIWA for now so pt can be assessed by primary team should symptoms develop  -UA unremarkable  -Follow up UDS, RVP, TSH    #Acute hypoxic respiratory failure  -No O2 at home  -Now requiring 3L NC  -EKG rate 69, NSR, no ST elevation or depression (personally reviewed)  -CXR unremarkable  -D-dimer wnl  -Wean O2 as tolerated    #Hypokalemia  -Likely due to GI losses  -K 3.2 in ED, s/p IV KCl 10mEq  -Replenish  -Trend electrolytes    DVT ppx - Lovenox    Wife Adam updated at bedside.    Discussed with Dr. Conrad   83 yo male with pmhx of BPH, HLD, HTN, hepatic artery aneurysm, melanoma presenting with AMS, weakness, and vomiting. Admitted for:    #Weakness, AMS, nausea and vomiting 2/2 CVA vs seizures vs EtOH withdrawal vs infection  -Admit to tele  -Code stroke, LNK 4/28 @10pm, NIHSS 12, outside of window for TNK  -CT/CTA head neck unremarkable  -Bedside dysphagia screen failed, likely due to nausea -> Zofran prn  -Continue home ASA and start atorvastatin 80 mg  -LR @ 75cc/hr  -TTE ordered  -EEG ordered  -Follow up a1c and lipid panel  -Neurochecks q4h  -Allow permissive HTN  -Fall and aspiration precautions  -Speech and swallow consult  -PT/OT/PMR evaluation  -Neuro consult  -Cardio consult  -No hx of EtOH withdrawal, will hold off on CIWA for now so pt can be assessed by primary team should symptoms develop  -UA unremarkable  -Follow up UDS, RVP, TSH    #Acute hypoxic respiratory failure  -No O2 at home  -Now requiring 3L NC  -EKG rate 69, NSR, no ST elevation or depression (personally reviewed)  -CXR unremarkable  -D-dimer wnl  -Wean O2 as tolerated    #Hypokalemia  -Likely due to GI losses  -K 3.2 in ED, s/p IV KCl 10mEq  -Replenish  -Trend electrolytes    #HTN  -Hold home lisinopril-HCTZ    #HLD  -Takes simvastatin at home  -Start atorvastatin, see above    DVT ppx - Lovenox    Wife Adam updated at bedside.    Discussed with Dr. Conrad

## 2025-04-29 NOTE — PHYSICAL THERAPY INITIAL EVALUATION ADULT - NSPTDISCHREC_GEN_A_CORE
How Severe Is Your Acne?: moderate
Outpatient PT
Is This A New Presentation, Or A Follow-Up?: Follow Up Acne

## 2025-04-29 NOTE — SWALLOW BEDSIDE ASSESSMENT ADULT - SLP PERTINENT HISTORY OF CURRENT PROBLEM
"83 yo male with pmhx of BPH, HLD, HTN, hepatic artery aneurysm, melanoma presenting with AMS, weakness, and vomiting. History provided both by patient and wife Adam at bedside. They had a late dinner and pt had a scotch around 4:30pm, followed by another scotch at 6:30pm and half a glass of wine with dinner which was pasta and broccoli. Went to bed around 10pm - no symptoms at this time. Pt remembers waking up around 2am feeling "sick" and knelt down on the bathroom floor to vomit in the toilet. Denies falling. Unsure if he lost consciousness but remembers everything was "dark" and not sure if he had any vision changes. Arms and leg felt weak at the time. Adam noticed that pt had "garbled speech" and was having both slurred speech and difficulty getting out words. No facial droop observed. Denies chest pain, SOB, diarrhea, dysuria. No known sick contacts. Denies history of stroke/TIA, MI, DVT/PE, seizures, and alcohol withdrawal."

## 2025-04-29 NOTE — CONSULT NOTE ADULT - ASSESSMENT
Mr. Garces is an 83 yo man here for vertigo, N/V.  Today neurological exam is benign, he was able to walk with PT along the [address] way.  likely BPPV.  due to stroke risk factor, would recommend MRI brain stroke protocol  continue Aspirin and high dose statin.  OT/PT/ST  stroke education.  cardiac monitoring, echocardiogram.  Keep normal BP.  lipid profile and A1C.  the rest of care per primary team

## 2025-04-29 NOTE — OCCUPATIONAL THERAPY INITIAL EVALUATION ADULT - NSACTIVITYREC_GEN_A_OT
Pt with no skilled OT needs at this time. Recommending supervision with shower transfer/ bathing for enhanced safety - pt and spouse in agreement.   Supervision ambulation on unit.

## 2025-04-29 NOTE — CONSULT NOTE ADULT - ASSESSMENT
83 yo male with pmhx of BPH, HLD, HTN, hepatic artery aneurysm, melanoma presenting with AMS, weakness, and vomiting. Admitted for AMS, cardiology consulted to r/o CVA    CTH/N and labs unremarkable  tele sinus rhythm, rate controlled  check tte with bubble study  followup neuro reccs 85 yo male with pmhx of BPH, HLD, HTN, hepatic artery aneurysm, melanoma presenting with AMS, weakness, and vomiting. pt states he felt too weak to get out of bed late last night/ early morning with HA and dizziness. denies any cp, sob or palpitations. Admitted for AMS, cardiology consulted to r/o CVA. pt currently at baseline    CTH/N and labs unremarkable  tele sinus rhythm, rate controlled  check tte with bubble study  cont asa and statin  followup neuro reccs 85 yo male with pmhx of BPH, HLD, HTN presenting with altered mental status, weakness, and vomiting. pt states he felt too weak to get out of bed late last night/ early morning with HA and dizziness. denies any chest pain, shortness of breath or palpitations. Admitted for altered mental status, cardiology consulted to r/o CVA. pt currently at baseline    CTH/N and labs unremarkable  tele sinus rhythm, rate controlled  check tte with bubble study  cont asa and statin  followup neuro reccs

## 2025-04-29 NOTE — PATIENT PROFILE ADULT - FALL HARM RISK - HARM RISK INTERVENTIONS
Assistance with ambulation/Assistance OOB with selected safe patient handling equipment/Communicate Risk of Fall with Harm to all staff/Tailored Fall Risk Interventions/Visual Cue: Yellow wristband and red socks/Bed in lowest position, wheels locked, appropriate side rails in place/Call bell, personal items and telephone in reach/Instruct patient to call for assistance before getting out of bed or chair/Non-slip footwear when patient is out of bed/[place] to call system/Physically safe environment - no spills, clutter or unnecessary equipment/Purposeful Proactive Rounding/Room/bathroom lighting operational, light cord in reach

## 2025-04-29 NOTE — ED PROVIDER NOTE - PHYSICAL EXAMINATION
exam:   General: lethargic. Arousable to painful and strong verbal stimuli.  Will follow simple commands to move extremities  HEENT: eyes perrl, pupils 3mm bilat, reactive. nose normal,   cor: RRR, s1s2, 2+rad pulses.   lungs: ctabl, no resp distress.   abd: soft, ntnd.   : no cvat  neuro: lethargic. Will respond to his name but will not state his name when asked. no facial asymmetry. SAM, 5/5 strength c nl sensation all extremities,   MSK: no extremity swelling.  Skin: normal, no rash

## 2025-04-29 NOTE — CONSULT NOTE ADULT - SUBJECTIVE AND OBJECTIVE BOX
84yM was admitted on     CC: Patient is a 84y old  Male who presents with a chief complaint of stroke rule out (2025 08:59)    HPI:  83 yo male with pmhx of BPH, HLD, HTN, hepatic artery aneurysm, melanoma presenting with AMS, weakness, and vomiting. Pt remembers waking up around 2am feeling "sick" and knelt down on the bathroom floor to vomit in the toilet. Denies falling. Unsure if he lost consciousness but remembers everything was "dark" and not sure if he had any vision changes. He was consuming alcohol  prior. Arms and leg felt weak at the time. Patient's wife noticed that pt had "garbled speech" and was having both slurred speech and difficulty getting out words. No facial droop observed. Denies chest pain, SOB, diarrhea, dysuria. No known sick contacts. He is admitted to medicine for additional workup to rule out stroke. PM&R consulted for rehab recommendations.    Imaging performed:  CT head - No acute intracranial hemorrhage, mass effect, or midline shift.  CT C spine -   No acute cervical spine fracture or traumatic malalignment.  Multilevel degenerative changes.  CT PERFUSION:  Technical limitations: None.    Core infarction: 0 ml  Penumbra / tissue at risk for active ischemia: 0 ml    CTA NECK:  No evidence of significant stenosis or occlusion.    CTA HEAD:  No large vessel occlusion, significant stenosis or vascular abnormality   identified.      REVIEW OF SYSTEMS  Constitutional - No fever, + fatigue  Respiratory - No cough, No wheezing, No shortness of breath  Cardiovascular - No chest pain, No palpitations  Gastrointestinal - No abdominal pain,+ constipation  Genitourinary - No dysuria, No frequency, No hematuria, No incontinence  Neurological - No headaches, No memory loss, No loss of strength    VITALS  T(C): 36.3 (25 @ 08:01), Max: 36.9 (25 @ 06:26)  HR: 76 (25 @ 08:36) (68 - 94)  BP: 126/78 (25 @ 08:36) (114/88 - 126/78)  RR: 18 (25 @ 08:01) (17 - 21)  SpO2: 98% (25 @ 08:36) (91% - 99%)  Wt(kg): --    PAST MEDICAL & SURGICAL HISTORY  Urinary retention    HTN (hypertension)    HLD (hyperlipidemia)    Melanoma    Urinary retention    Enlarged prostate with lower urinary tract symptoms (LUTS)    Yin catheter status    History of BPH    Hepatic artery aneurysm    Status post Mohs surgery    H/O colonoscopy    Enlarged prostate    FUNCTIONAL HISTORY  Lives with wife in house, a few steps to enter  Independent prior, no AD    CURRENT FUNCTIONAL STATUS   PT    Bed Mobility: Sit to Supine:     · Level of [place]	independent    Bed Mobility: Supine to Sit:     · Level of [place]	independent    Transfer: Bed to Chair:     Transfer Skill: Bed to Chair   · Level of [place]	independent    Transfer: Sit to Stand:     · Level of [place]	independent    Gait Skills:     · Level of [place]	supervision  · Gait Distance	75 feet    Gait Analysis:     SOCIAL HISTORY - as per documentation/history  Smoking - None  EtOH - None  Drugs - None    FAMILY HISTORY   FH: colon cancer (Mother)    FH: melanoma (Sibling)      RECENT LABS - Reviewed  CBC Full  -  ( 2025 03:05 )  WBC Count : 8.54 K/uL  RBC Count : 4.89 M/uL  Hemoglobin : 13.0 g/dL  Hematocrit : 39.8 %  Platelet Count - Automated : 239 K/uL  Mean Cell Volume : 81.4 fl  Mean Cell Hemoglobin : 26.6 pg  Mean Cell Hemoglobin Concentration : 32.7 g/dL  Auto Neutrophil # : x  Auto Lymphocyte # : x  Auto Monocyte # : x  Auto Eosinophil # : x  Auto Basophil # : x  Auto Neutrophil % : x  Auto Lymphocyte % : x  Auto Monocyte % : x  Auto Eosinophil % : x  Auto Basophil % : x        141  |  103  |  20  ----------------------------<  128[H]  3.2[L]   |  25  |  0.99    Ca    8.5      2025 03:05  Phos  3.6       Mg     2.1         TPro  6.5  /  Alb  3.3  /  TBili  0.2  /  DBili  x   /  AST  39  /  ALT  38  /  AlkPhos  62      Urinalysis Basic - ( 2025 06:00 )    Color: Yellow / Appearance: Clear / S.045 / pH: x  Gluc: x / Ketone: Negative mg/dL  / Bili: Negative / Urobili: 0.2 mg/dL   Blood: x / Protein: Negative mg/dL / Nitrite: Negative   Leuk Esterase: Negative / RBC: x / WBC x   Sq Epi: x / Non Sq Epi: x / Bacteria: x        ALLERGIES  No Known Allergies      MEDICATIONS   aspirin  chewable 81 milliGRAM(s) Oral daily  atorvastatin 80 milliGRAM(s) Oral at bedtime  enoxaparin Injectable 40 milliGRAM(s) SubCutaneous every 24 hours  influenza  Vaccine (HIGH DOSE) 0.5 milliLiter(s) IntraMuscular once  lactated ringers. 1000 milliLiter(s) IV Continuous <Continuous>  ondansetron Injectable 4 milliGRAM(s) IV Push every 6 hours PRN      ----------------------------------------------------------------------------------------  PHYSICAL EXAM  Constitutional - NAD, Comfortable  HEENT - NCAT, EOMI  Neck - Supple, No limited ROM  Chest - Breathing comfortably, No wheezing  Cardiovascular - S1S2   Abdomen - Soft   Extremities -No calf tenderness   Neurologic Exam -                    Cognitive - AAO to self, place, year     Communication - Fluent, No dysarthria     Cranial Nerves - CN 2-12 intact     Motor - No focal deficits                    LEFT    UE - ShAB 5/5, EF 5/5, EE 5/5, WE 5/5,  5/5                    RIGHT UE - ShAB 5/5, EF 5/5, EE 5/5, WE 5/5,  5/5                    LEFT    LE - HF 5/5, KE 5/5, DF 5/5, PF 5/5                    RIGHT LE - HF 5/5, KE 5/5, DF 5/5, PF 5/5     Psychiatric - Mood stable, Affect WNL  ----------------------------------------------------------------------------------------  ASSESSMENT/PLAN  84 year old male admitted to medicine for stroke workup  R/o CVA- imaging thus far negative. pending EEG, neurology following; on ASA, Lipitor; u tox  +THC, alcohol level 16  DVT PPX - Lovenox  Rehab -Expect patient to achieve functional goals for DC HOME with OUTPATIENT followups. Will sign off. Recommend ongoing mobilization by staff to maintain cardiopulmonary function and prevention of secondary complications related to debility. Discussed with rehab team.

## 2025-04-29 NOTE — EEG REPORT - NS EEG TEXT BOX
[facility] [id number]     Study Date: 04-29-25  Duration: 20 min  --------------------------------------------------------------------------------------------------  History:  CC/ HPI Patient is a 84y old  Male who presents with a chief complaint of stroke rule out (29 Apr 2025 08:59)    MEDICATIONS  (STANDING):  aspirin  chewable 81 milliGRAM(s) Oral daily  atorvastatin 80 milliGRAM(s) Oral at bedtime  enoxaparin Injectable 40 milliGRAM(s) SubCutaneous every 24 hours  influenza  Vaccine (HIGH DOSE) 0.5 milliLiter(s) IntraMuscular once  lactated ringers. 1000 milliLiter(s) (75 mL/Hr) IV Continuous <Continuous>    --------------------------------------------------------------------------------------------------  Study Interpretation:    [[[Abbreviation Key:  PDR=alpha rhythm/posterior dominant rhythm. A-P=anterior posterior.  Amplitude: ‘very low’:<20; ‘low’:20-49; ‘medium’:; ‘high’:>150uV.  Persistence for periodic/rhythmic patterns (% of epoch) ‘rare’:<1%; ‘occasional’:1-10%; ‘frequent’:10-50%; ‘abundant’:50-90%; ‘continuous’:>90%.  Persistence for sporadic discharges: ‘rare’:<1/hr; ‘occasional’:1/min-1/hr; ‘frequent’:>1/min; ‘abundant’:>1/10 sec.  RPP=rhythmic and periodic patterns; GRDA=generalized rhythmic delta activity; FIRDA=frontal intermittent GRDA; LRDA=lateralized rhythmic delta activity; TIRDA=temporal intermittent rhythmic delta activity;  LPD=PLED=lateralized periodic discharges; GPD=generalized periodic discharges; BIPDs =bilateral independent periodic discharges; Mf=multifocal; SIRPDs=stimulus induced rhythmic, periodic, or ictal appearing discharges; BIRDs=brief potentially ictal rhythmic discharges >4 Hz, lasting .5-10s; PFA (paroxysmal bursts >13 Hz or =8 Hz <10s).  Modifiers: +F=with fast component; +S=with spike component; +R=with rhythmic component.  S-B=burst suppression pattern.  Max=maximal. N1-drowsy; N2-stage II sleep; N3-slow wave sleep. SSS/BETS=small sharp spikes/benign epileptiform transients of sleep. HV=hyperventilation; PS=photic stimulation]]]    Daily EEG Visual Analysis    FINDINGS:      Background:  Continuity: Continuous  Symmetry: Symmetric  Posterior dominant rhythm (PDR): 8.5-9 Hz, reactive to eye closure. Symmetric low-amplitude frontal beta in wakefulness.  Voltage: Normal  Anterior-Posterior Gradient: Present  Other background findings: None  Breach: Absent    Background Slowing:  Generalized slowing: None  Focal slowing: None    State Changes:   Drowsiness is characterized by fragmentation and slowing of the background activity.  Stage 2 sleep is characterized by symmetric K complexes.    Interictal Findings:  None    Electrographic and Electroclinical seizures:  None    Other Clinical Events:  None    Activation Procedures:   Hyperventilation is not performed.    Photic stimulation is performed and does not elicit any abnormalities.      Artifacts:  Intermittent myogenic and movement artifacts are present.    Single-lead EKG: Regular rhythm    EEG Classification / Summary:  Normal EEG in the awake, drowsy, and asleep states.   No focal or epileptiform abnormalities are captured.   No seizures.    Clinical Impression:  A normal routine EEG neither refutes nor supports a diagnosis of epilepsy.   No epileptiform abnormalities or seizures.          -------------------------------------------------------------------------------------------------------    Eloisa Mazariegos MD  Attending Physician, [facility]    -------------------------------------------------------------------------------------------------------    To reach EEG technologist:  Please use the pager number for the appropriate hospital or contact the .  At [facility] - Pager #: [phone]    To reach EEG-reading physician:  EEG Reading Room Phone #: [phone]  Epilepsy Answering Service after 5PM and before 8:30AM: Phone #: [phone]

## 2025-04-29 NOTE — PHYSICAL THERAPY INITIAL EVALUATION ADULT - PERTINENT HX OF CURRENT PROBLEM, REHAB EVAL
83 yo male with pmhx of BPH, HLD, HTN, hepatic artery aneurysm, melanoma presenting with AMS, weakness, and vomiting.

## 2025-04-29 NOTE — ED PROVIDER NOTE - OBJECTIVE STATEMENT
84-year-old male with history of hypertension, hyperlipidemia, taking aspirin brought in by EMS from home for altered mental status tonight.  Wife last saw patient normal when they went to bed at 10 PM.  She was awakened by hearing her  retching in the bathroom at about 2 AM.  She found him on the floor, unclear if he fell or if he was just trying to throw up in the toilet.  She states that he was asymptomatic and otherwise in normal state of health earlier today.  They did yard work today and he had a glass of scotch at about 5 and maybe a glass or 2 of wine this evening.  No recent illness.  No fever.  Patient states he has headache.  Denies chest pain.  No focal weakness/paralysis noted by his wife 84-year-old male with history of hypertension, hyperlipidemia, taking aspirin brought in by EMS from home for altered mental status tonight.  Wife last saw patient normal when they went to bed at 10 PM.  She was awakened by hearing her  retching in the bathroom at about 2 AM.  She found him on the floor, unclear if he fell or if he was just trying to throw up in the toilet.  She states that he was asymptomatic and otherwise in normal state of health earlier today.  They did yard work today and he had a glass of scotch at about 5p and maybe a glass or 2 of wine this evening.  No recent illness.  No fever.  Patient states he has headache.  Denies chest pain.  No focal weakness/paralysis noted by his wife

## 2025-04-29 NOTE — PATIENT PROFILE ADULT - DO YOU NEED ADDITIONAL SERVICES TO MANAGE ANY OF THESE MEDICAL CONDITIONS AT HOME?
Keep doing exercises    Any problem with weakness or pain in right arm or leg needs MRI immediately  
no

## 2025-04-29 NOTE — CONSULT NOTE ADULT - TIME BILLING
initial consult-- face to face time encounter and counseling the patient, care coordination, reviewing chart and data-including but not limited to labs and imaging, discussion with rehab team.
chart reviewed, face to face with patient for history and physical exam, discussed possible diagnosis, plan of care. discussed with primary team, documentation

## 2025-04-29 NOTE — OCCUPATIONAL THERAPY INITIAL EVALUATION ADULT - ADDITIONAL COMMENTS
Pt lives in a private home with his wife. 3 steps to enter with railing, first floor set up. Stall shower with built in bench, grab bars. Fully independent with ADLs/IADLs PTA, does not own any DME, drives. Active at home/community, gardens, golfs.

## 2025-04-29 NOTE — OCCUPATIONAL THERAPY INITIAL EVALUATION ADULT - MD ORDER
MD orders received, chart reviewed, contents noted. Pt cleared for OT initial evaluation by RN. Refer below for findings.

## 2025-04-29 NOTE — SWALLOW BEDSIDE ASSESSMENT ADULT - SLP GENERAL OBSERVATIONS
Patient received in bed alert, capable of engaging in conversation and followed directions for the purpose of evaluation. Received on room air, HR 75, RR 18, vital signs stable throughout, in no distress.

## 2025-04-29 NOTE — H&P ADULT - HISTORY OF PRESENT ILLNESS
83 yo male with pmhx of BPH, HLD, HTN, melanoma presenting with AMS, weakness, and vomiting. History provided both by patient and wife Adam at bedside. They had a late dinner and pt had a scotch around 4:30pm, followed by another scotch at 6:30pm and half a glass of wine with dinner which was pasta and broccoli. Went to bed around 10pm - no symptoms at this time. Pt remembers waking up around 2am feeling "sick" and knelt down on the bathroom floor to vomit in the toilet. Denies falling. Unsure if he lost consciousness but remembers everything was "dark" and not sure if he had any vision changes. Arms and leg felt weak at the time. Adam noticed that pt had "garbled speech" and was having both slurred speech and difficulty getting out words. No facial droop observed. Denies chest pain, SOB, diarrhea, dysuria. No known sick contacts. Denies history of stroke/TIA, MI, DVT/PE, seizures, and alcohol withdrawal.    ED course-  Afebrile, HR 72-94, -124/70-86, RR 18-20, SpO2 94% > 91%, now 99% on 3L NC  NIHSS 12, improved to 0  No leukocytosis, K 3.2, trop 7.8, EtOH level 16  CT/CTA head neck unremarkable, CXR unremarkable  S/p 1L NS, IV Zofran 4 mg, IV KCl 10 mEq 83 yo male with pmhx of BPH, HLD, HTN, hepatic artery aneurysm, melanoma presenting with AMS, weakness, and vomiting. History provided both by patient and wife Adam at bedside. They had a late dinner and pt had a scotch around 4:30pm, followed by another scotch at 6:30pm and half a glass of wine with dinner which was pasta and broccoli. Went to bed around 10pm - no symptoms at this time. Pt remembers waking up around 2am feeling "sick" and knelt down on the bathroom floor to vomit in the toilet. Denies falling. Unsure if he lost consciousness but remembers everything was "dark" and not sure if he had any vision changes. Arms and leg felt weak at the time. Adam noticed that pt had "garbled speech" and was having both slurred speech and difficulty getting out words. No facial droop observed. Denies chest pain, SOB, diarrhea, dysuria. No known sick contacts. Denies history of stroke/TIA, MI, DVT/PE, seizures, and alcohol withdrawal.    ED course-  Afebrile, HR 72-94, -124/70-86, RR 18-20, SpO2 94% > 91%, now 99% on 3L NC  NIHSS 12, improved to 0  No leukocytosis, K 3.2, trop 7.8, EtOH level 16  CT/CTA head neck unremarkable, CXR unremarkable  S/p 1L NS, IV Zofran 4 mg, IV KCl 10 mEq

## 2025-04-29 NOTE — CONSULT NOTE ADULT - NS ATTEND AMEND GEN_ALL_CORE FT
I have seen and examined the patient. I have discussed the case with PADMINI Green.    Juan Garces is an 84 year old man with past medical history of Hypertension, Hyperlipidemia and BPH who presented with episode of presyncope and altered mental status, admitted to rule out CVA.    The patient is present with his wife, he recalls waking up feeling unwell and vomiting in the bathroom, he felt dizziness, denies actual syncope. Denies chest pain, palpitations or shortness of breath. ECG consistent with sinus rhythm and first degree AV block. Echo consistent with normal LVEF 60-65%, mildly enlarged RV size with normal RV systolic function, no significant valvular disease, no pericardial effusion. Telemetry consistent with sinus rhythm and PVCs. CXR clear. Troponin negative.    Overall, less concern for cardiogenic source of symptoms, recommend to continue to monitor on telemetry for arrhythmias or high degree conduction disease. Follow up Neurology - recommendation for MRI brain. Patient also noted to have elevated alcohol level which may have contributed to symptoms. Recommend to reduce alcohol intake and increase water intake. Recommend outpatient extended cardiac event monitoring with his cardiologist, Dr. Mar.    Will sign out this case to cardiologist to follow along tomorrow.    Melany Fernandez MD  Cardiology

## 2025-04-29 NOTE — H&P ADULT - NSHPPHYSICALEXAM_GEN_ALL_CORE
GENERAL: Appears uncomfortable due to nausea, ill-appearing  HEAD: Atraumatic, Normocephalic  EYES: EOMI, conjunctiva and sclera clear  NECK: Supple, No JVD  CHEST/LUNG: Clear to auscultation bilaterally. No wheezing, crackles, or rhonchi. Unlabored respirations.  HEART: Regular rate and rhythm. S1 and S2. No murmurs.  ABDOMEN: Soft, Nontender, Nondistended. Bowel sounds present.   EXTREMITIES: No clubbing, cyanosis, or edema  NEURO: AAOx3, no slurred speech, CN II-XII intact, strength 5/5 throughout, sensation grossly intact, finger-nose-finger and heel-knee-shin intact without dysmetria

## 2025-04-29 NOTE — ED ADULT NURSE REASSESSMENT NOTE - NS ED NURSE REASSESS COMMENT FT1
o345 took over pt  care pt  is  improving  nih   from  12 to 0  pt vomited and stated  he feel  much better wife at bedside and updated . pt is cooperative .

## 2025-04-29 NOTE — H&P ADULT - ATTENDING COMMENTS
#AMS possible TIA  #Lethargic  #Fall  #Nausea and vomiting  - NIH 12, TNK not given no LVO  - EEG  - echocardiogram  - aspirin and atorvastatin   - cardiology consult  - neuro consult  - PT consult

## 2025-04-29 NOTE — PHYSICAL THERAPY INITIAL EVALUATION ADULT - ADDITIONAL COMMENTS
Pt lives in a private home with his wife. 3 steps to enter with railing, first floor set up. Fully independent PTA, does not own any DME.

## 2025-04-29 NOTE — SWALLOW BEDSIDE ASSESSMENT ADULT - COMMENTS
IMPRESSION:    CT PERFUSION:  Technical limitations: None.    Core infarction: 0 ml  Penumbra / tissue at risk for active ischemia: 0 ml    CTA NECK:  No evidence of significant stenosis or occlusion.    CTA HEAD:  No large vessel occlusion, significant stenosis or vascular abnormality   identified.    WBC 4/29: 8.54    Patient is unknown to this service. Patient reports living at home and tolerating a regular consistency diet and thin liquids. Patient denies a history of trouble swallowing, coughing while eating and odynophagia.

## 2025-04-29 NOTE — OCCUPATIONAL THERAPY INITIAL EVALUATION ADULT - PERTINENT HX OF CURRENT PROBLEM, REHAB EVAL
83 yo male with pmhx of BPH, HLD, HTN, hepatic artery aneurysm, melanoma presenting with AMS, weakness, and vomiting. History provided both by patient and wife Adam at bedside. They had a late dinner and pt had a scotch around 4:30pm, followed by another scotch at 6:30pm and half a glass of wine with dinner which was pasta and broccoli. Went to bed around 10pm - no symptoms at this time. Pt remembers waking up around 2am feeling "sick" and knelt down on the bathroom floor to vomit in the toilet. Denies falling. Unsure if he lost consciousness but remembers everything was "dark" and not sure if he had any vision changes. Arms and leg felt weak at the time. Adam noticed that pt had "garbled speech" and was having both slurred speech and difficulty getting out words. No facial droop observed. Denies chest pain, SOB, diarrhea, dysuria. No known sick contacts. Denies history of stroke/TIA, MI, DVT/PE, seizures, and alcohol withdrawal.

## 2025-04-29 NOTE — ED PROVIDER NOTE - CLINICAL SUMMARY MEDICAL DECISION MAKING FREE TEXT BOX
84-year-old male with history of hypertension, hyperlipidemia, taking aspirin brought in by EMS from home for altered mental status tonight.  Wife last saw patient normal when they went to bed at 10 PM.  She was awakened by hearing her  retching in the bathroom at about 2 AM.  She found him on the floor, unclear if he fell or if he was just trying to throw up in the toilet.  She states that he was asymptomatic and otherwise in normal state of health earlier today.  They did yard work today and he had a glass of scotch at about 5 and maybe a glass or 2 of wine this evening.  No recent illness.  No fever.  Patient states he has headache.  Denies chest pain.  No focal weakness/paralysis noted by his wife    Patient very drowsy/lethargic but will follow simple commands with strong verbal and physical stimulation.  Moving all extremities equally on command.  Able to acknowledge that he has headache.  Mumbling.  No facial asymmetry.  Concern for acute intracranial hemorrhage.  Partial DDx: Spontaneous ICH, traumatic ICH, intoxication, metabolic abnormality.  Less likely infectious etiology/CNS infection given hyperacute onset, no preceding symptoms.  Stroke code activated.  Will check labs, CT head 84-year-old male with history of hypertension, hyperlipidemia, taking aspirin brought in by EMS from home for altered mental status tonight.  Wife last saw patient normal when they went to bed at 10 PM.  She was awakened by hearing her  retching in the bathroom at about 2 AM.  She found him on the floor, unclear if he fell or if he was just trying to throw up in the toilet.  She states that he was asymptomatic and otherwise in normal state of health earlier today.  They did yard work today and he had a glass of scotch at about 5 and maybe a glass or 2 of wine this evening.  No recent illness.  No fever.  Patient states he has headache.  Denies chest pain.  No focal weakness/paralysis noted by his wife    Patient very drowsy/lethargic but will follow simple commands with strong verbal and physical stimulation.  Moving all extremities equally on command.  Able to acknowledge that he has headache.  Mumbling.  No facial asymmetry.  Concern for acute intracranial hemorrhage.  Partial DDx: Spontaneous ICH, traumatic ICH, intoxication, metabolic abnormality.  Less likely infectious etiology/CNS infection given hyperacute onset, no preceding symptoms.  Stroke code activated.  Will check labs, CT head    0 330: Patient with significant clinical improvement.  Now able to converse and speak.  Answering questions, speaking full sentences.  Still appears uncomfortable nauseous retching.  Complaining of nausea and vomiting.  Denies headache currently.  Denies abdominal pain.  Feels dizzy.  Now moving all extremities.  NIHSS 0 currently.  CT head Noncon unremarkable.  No bleed 84-year-old male with history of hypertension, hyperlipidemia, taking aspirin brought in by EMS from home for altered mental status tonight.  Wife last saw patient normal when they went to bed at 10 PM.  She was awakened by hearing her  retching in the bathroom at about 2 AM.  She found him on the floor, unclear if he fell or if he was just trying to throw up in the toilet.  She states that he was asymptomatic and otherwise in normal state of health earlier today.  They did yard work today and he had a glass of scotch at about 5 and maybe a glass or 2 of wine this evening.  No recent illness.  No fever.  Patient states he has headache.  Denies chest pain.  No focal weakness/paralysis noted by his wife    Patient very drowsy/lethargic but will follow simple commands with strong verbal and physical stimulation.  Moving all extremities equally on command.  Able to acknowledge that he has headache.  Mumbling.  No facial asymmetry.  Concern for acute intracranial hemorrhage.  Partial DDx: Spontaneous ICH, traumatic ICH, intoxication, metabolic abnormality.  Less likely infectious etiology/CNS infection given hyperacute onset, no preceding symptoms.  Stroke code activated.  Will check labs, CT head    0 330: Patient with significant clinical improvement.  Now able to converse and speak.  Answering questions, speaking full sentences.  Still appears uncomfortable nauseous retching.  Complaining of nausea and vomiting.  Denies headache currently.  Denies abdominal pain.  Feels dizzy.  Now moving all extremities.  NIHSS 0 currently.  CT head Noncon unremarkable.  No bleed.  CT C-spine was also done due to  possible fall.  No acute abnormalities or traumatic injury seen on C-spine CT. 84-year-old male with history of hypertension, hyperlipidemia, taking aspirin brought in by EMS from home for altered mental status tonight.  Wife last saw patient normal when they went to bed at 10 PM.  She was awakened by hearing her  retching in the bathroom at about 2 AM.  She found him on the floor, unclear if he fell or if he was just trying to throw up in the toilet.  She states that he was asymptomatic and otherwise in normal state of health earlier today.  They did yard work today and he had a glass of scotch at about 5 and maybe a glass or 2 of wine this evening.  No recent illness.  No fever.  Patient states he has headache.  Denies chest pain.  No focal weakness/paralysis noted by his wife    Patient very drowsy/lethargic but will follow simple commands with strong verbal and physical stimulation.  Moving all extremities equally on command.  Able to acknowledge that he has headache.  Mumbling.  No facial asymmetry.  Concern for acute intracranial hemorrhage.  Partial DDx: Spontaneous ICH, traumatic ICH, intoxication, metabolic abnormality.  Less likely infectious etiology/CNS infection given hyperacute onset, no preceding symptoms.  Stroke code activated, but patient not a candidate for thrombolytics as last normal over 5 hours ago.  Will check labs, CT/CTA  head    0 330: Patient with significant clinical improvement.  Now able to converse and speak.  Answering questions, speaking full sentences.  Still appears uncomfortable nauseous retching.  Complaining of nausea and vomiting.  Denies headache currently.  Denies abdominal pain.  Feels dizzy.  Now moving all extremities.  NIHSS 0 currently.  CT head Noncon unremarkable.  No bleed.  CT C-spine was also done due to  possible fall.  No acute abnormalities or traumatic injury seen on C-spine CT.    0410 Patient CTA head neck unremarkable.  No stenosis or occlusions.  Patient still appears uncomfortable still nauseous and feeling ill.  Feels dizzy, nonvertiginous.  Labs unremarkable except for mild hypokalemia 3.2.  Blood sugar normal.  Alcohol only 16.  Patient denies any unusual medication or recreational drug ingestion.? Food poisoning, viral syndrome/gastroenteritis.  Etiology still unclear.  Will admit for further evaluation and observation 84-year-old male with history of hypertension, hyperlipidemia, taking aspirin brought in by EMS from home for altered mental status tonight.  Wife last saw patient normal when they went to bed at 10 PM.  She was awakened by hearing her  retching in the bathroom at about 2 AM.  She found him on the floor, unclear if he fell or if he was just trying to throw up in the toilet.  She states that he was asymptomatic and otherwise in normal state of health earlier today.  They did yard work today and he had a glass of scotch at about 5 and maybe a glass or 2 of wine this evening.  No recent illness.  No fever.  Patient states he has headache.  Denies chest pain.  No focal weakness/paralysis noted by his wife    Patient very drowsy/lethargic but will follow simple commands with strong verbal and physical stimulation.  Moving all extremities equally on command.  Able to acknowledge that he has headache.  Mumbling.  No facial asymmetry.  Concern for acute intracranial hemorrhage.  Partial DDx: Spontaneous ICH, traumatic ICH, intoxication, metabolic abnormality.  Less likely infectious etiology/CNS infection given hyperacute onset, no preceding symptoms.  Stroke code activated, but patient not a candidate for thrombolytics as last normal over 5 hours ago.  Will check labs, CT/CTA  head    0 330: Patient with significant clinical improvement.  Now able to converse and speak.  Answering questions, speaking full sentences.  Still appears uncomfortable nauseous retching.  Complaining of nausea and vomiting.  Denies headache currently.  Denies abdominal pain.  Feels dizzy.  Now moving all extremities.  NIHSS 0 currently.  CT head Noncon unremarkable.  No bleed.  CT C-spine was also done due to  possible fall.  No acute abnormalities or traumatic injury seen on C-spine CT.    0410 Patient CTA head neck unremarkable.  No stenosis or occlusions.  Patient still appears uncomfortable still nauseous and feeling ill.  Feels dizzy, nonvertiginous.  Labs unremarkable except for mild hypokalemia 3.2.  Blood sugar normal.  Alcohol only 16.  Patient denies any unusual medication or recreational drug ingestion.? Food poisoning, viral syndrome/gastroenteritis.  Etiology still unclear.  Will admit for further evaluation and observation. Case discussed with attending hospitalist Dr. Conrad, accepting admission

## 2025-04-29 NOTE — CONSULT NOTE ADULT - SUBJECTIVE AND OBJECTIVE BOX
DEBORA WAHL  [id number]      HPI:  85 yo male with pmhx of BPH, HLD, HTN, hepatic artery aneurysm, melanoma presenting with AMS, weakness, and vomiting. History provided both by patient and wife Adam at bedside. They had a late dinner and pt had a scotch around 4:30pm, followed by another scotch at 6:30pm and half a glass of wine with dinner which was pasta and broccoli. Went to bed around 10pm - no symptoms at this time. Pt remembers waking up around 2am feeling "sick" and knelt down on the bathroom floor to vomit in the toilet. Denies falling. Unsure if he lost consciousness but remembers everything was "dark" and not sure if he had any vision changes. Arms and leg felt weak at the time. Adam noticed that pt had "garbled speech" and was having both slurred speech and difficulty getting out words. No facial droop observed. Denies chest pain, SOB, diarrhea, dysuria. No known sick contacts. Denies history of stroke/TIA, MI, DVT/PE, seizures, and alcohol withdrawal.    ED course-  Afebrile, HR 72-94, -124/70-86, RR 18-20, SpO2 94% > 91%, now 99% on 3L NC  NIHSS 12, improved to 0  No leukocytosis, K 3.2, trop 7.8, EtOH level 16  CT/CTA head neck unremarkable, CXR unremarkable  S/p 1L NS, IV Zofran 4 mg, IV KCl 10 mEq (29 Apr 2025 04:44)        ALLERGIES:  No Known Allergies      PAST MEDICAL & SURGICAL HISTORY:  HTN (hypertension)      HLD (hyperlipidemia)      Melanoma      History of BPH      Hepatic artery aneurysm      Status post Mohs surgery  chest - 1982      H/O colonoscopy      Enlarged prostate  surgery done in oct 2020            CURRENT MEDICATIONS:  MEDICATIONS  (STANDING):  aspirin  chewable 81 milliGRAM(s) Oral daily  atorvastatin 80 milliGRAM(s) Oral at bedtime  enoxaparin Injectable 40 milliGRAM(s) SubCutaneous every 24 hours  influenza  Vaccine (HIGH DOSE) 0.5 milliLiter(s) IntraMuscular once  lactated ringers. 1000 milliLiter(s) (75 mL/Hr) IV Continuous <Continuous>  potassium chloride  10 mEq/100 mL IVPB 10 milliEquivalent(s) IV Intermittent every 1 hour    MEDICATIONS  (PRN):  ondansetron Injectable 4 milliGRAM(s) IV Push every 6 hours PRN Nausea and/or Vomiting      SOCIAL HISTORY:      FAMILY HISTORY:  FH: colon cancer (Mother)    FH: melanoma (Sibling)        ROS:  All 10 systems reviewed and positives noted in HPI    OBJECTIVE:    VITAL SIGNS:  Vital Signs Last 24 Hrs  T(C): 36.3 (29 Apr 2025 08:01), Max: 36.9 (29 Apr 2025 06:26)  T(F): 97.4 (29 Apr 2025 08:01), Max: 98.4 (29 Apr 2025 06:26)  HR: 76 (29 Apr 2025 08:36) (68 - 94)  BP: 126/78 (29 Apr 2025 08:36) (114/88 - 126/78)  BP(mean): 88 (29 Apr 2025 03:30) (88 - 92)  RR: 18 (29 Apr 2025 08:01) (17 - 21)  SpO2: 98% (29 Apr 2025 08:36) (91% - 99%)    Parameters below as of 29 Apr 2025 08:36  Patient On (Oxygen Delivery Method): room air        PHYSICAL EXAM:  General: well appearing, no distress  HEENT: sclera anicteric  Neck: supple, no carotid bruits b/l  CVS: JVP ~ 7 cm H20, RRR, s1, s2, no murmurs/rubs/gallops  Chest: unlabored respirations, clear to auscultation b/l  Abdomen: non-distended  Extremities: no lower extremity edema b/l  Neuro: awake, alert & oriented x 3  Psych: normal affect      LABS:                        13.0   8.54  )-----------( 239      ( 29 Apr 2025 03:05 )             39.8     04-29    141  |  103  |  20  ----------------------------<  128[H]  3.2[L]   |  25  |  0.99    Ca    8.5      29 Apr 2025 03:05  Phos  3.6     04-29  Mg     2.1     04-29    TPro  6.5  /  Alb  3.3  /  TBili  0.2  /  DBili  x   /  AST  39  /  ALT  38  /  AlkPhos  62  04-29        PT/INR - ( 29 Apr 2025 03:05 )   PT: 10.6 sec;   INR: 0.90 ratio         PTT - ( 29 Apr 2025 03:05 )  PTT:25.5 sec      ECG:      TTE:      < from: CT Brain Perfusion Maps Stroke (04.29.25 @ 03:19) >    IMPRESSION:    CT PERFUSION:  Technical limitations: None.    Core infarction: 0 ml  Penumbra / tissue at risk for active ischemia: 0 ml    CTA NECK:  No evidence of significant stenosis or occlusion.    CTA HEAD:  No large vessel occlusion, significant stenosis orvascular abnormality   identified.    < end of copied text >     DEBORA WAHL  [id number]      HPI:  85 yo male with pmhx of BPH, HLD, HTN, hepatic artery aneurysm, melanoma presenting with AMS, weakness, and vomiting. History provided both by patient and wife Adam at bedside. They had a late dinner and pt had a scotch around 4:30pm, followed by another scotch at 6:30pm and half a glass of wine with dinner which was pasta and broccoli. Went to bed around 10pm - no symptoms at this time. Pt remembers waking up around 2am feeling "sick" and knelt down on the bathroom floor to vomit in the toilet. Denies falling. Unsure if he lost consciousness but remembers everything was "dark" and not sure if he had any vision changes. Arms and leg felt weak at the time. Adam noticed that pt had "garbled speech" and was having both slurred speech and difficulty getting out words. No facial droop observed. Denies chest pain, SOB, diarrhea, dysuria. No known sick contacts. Denies history of stroke/TIA, MI, DVT/PE, seizures, and alcohol withdrawal.    ED course-  Afebrile, HR 72-94, -124/70-86, RR 18-20, SpO2 94% > 91%, now 99% on 3L NC  NIHSS 12, improved to 0  No leukocytosis, K 3.2, trop 7.8, EtOH level 16  CT/CTA head neck unremarkable, CXR unremarkable  S/p 1L NS, IV Zofran 4 mg, IV KCl 10 mEq (29 Apr 2025 04:44)        ALLERGIES:  No Known Allergies      PAST MEDICAL & SURGICAL HISTORY:  HTN (hypertension)      HLD (hyperlipidemia)      Melanoma      History of BPH      Hepatic artery aneurysm      Status post Mohs surgery  chest - 1982      H/O colonoscopy      Enlarged prostate  surgery done in oct 2020            CURRENT MEDICATIONS:  MEDICATIONS  (STANDING):  aspirin  chewable 81 milliGRAM(s) Oral daily  atorvastatin 80 milliGRAM(s) Oral at bedtime  enoxaparin Injectable 40 milliGRAM(s) SubCutaneous every 24 hours  influenza  Vaccine (HIGH DOSE) 0.5 milliLiter(s) IntraMuscular once  lactated ringers. 1000 milliLiter(s) (75 mL/Hr) IV Continuous <Continuous>  potassium chloride  10 mEq/100 mL IVPB 10 milliEquivalent(s) IV Intermittent every 1 hour    MEDICATIONS  (PRN):  ondansetron Injectable 4 milliGRAM(s) IV Push every 6 hours PRN Nausea and/or Vomiting      SOCIAL HISTORY:  social etoh use last drink last night 2 scotch and glass of wine    FAMILY HISTORY:  FH: colon cancer (Mother)    FH: melanoma (Sibling)        ROS:  All 10 systems reviewed and positives noted in HPI    OBJECTIVE:    VITAL SIGNS:  Vital Signs Last 24 Hrs  T(C): 36.3 (29 Apr 2025 08:01), Max: 36.9 (29 Apr 2025 06:26)  T(F): 97.4 (29 Apr 2025 08:01), Max: 98.4 (29 Apr 2025 06:26)  HR: 76 (29 Apr 2025 08:36) (68 - 94)  BP: 126/78 (29 Apr 2025 08:36) (114/88 - 126/78)  BP(mean): 88 (29 Apr 2025 03:30) (88 - 92)  RR: 18 (29 Apr 2025 08:01) (17 - 21)  SpO2: 98% (29 Apr 2025 08:36) (91% - 99%)    Parameters below as of 29 Apr 2025 08:36  Patient On (Oxygen Delivery Method): room air        PHYSICAL EXAM:  General: no distress  HEENT: sclera anicteric  Neck: supple, no carotid bruits b/l  CVS: JVP ~ 7 cm H20, RRR,  no murmurs/rubs/gallops  Chest: unlabored respirations, clear to auscultation b/l  Abdomen: non-distended  Extremities: no lower extremity edema b/l  Neuro: awake, alert & oriented x 3  Psych: normal affect      LABS:                        13.0   8.54  )-----------( 239      ( 29 Apr 2025 03:05 )             39.8     04-29    141  |  103  |  20  ----------------------------<  128[H]  3.2[L]   |  25  |  0.99    Ca    8.5      29 Apr 2025 03:05  Phos  3.6     04-29  Mg     2.1     04-29    TPro  6.5  /  Alb  3.3  /  TBili  0.2  /  DBili  x   /  AST  39  /  ALT  38  /  AlkPhos  62  04-29        PT/INR - ( 29 Apr 2025 03:05 )   PT: 10.6 sec;   INR: 0.90 ratio         PTT - ( 29 Apr 2025 03:05 )  PTT:25.5 sec      ECG: Sinus rhyhm first deg block      TTE:      < from: CT Brain Perfusion Maps Stroke (04.29.25 @ 03:19) >    IMPRESSION:    CT PERFUSION:  Technical limitations: None.    Core infarction: 0 ml  Penumbra / tissue at risk for active ischemia: 0 ml    CTA NECK:  No evidence of significant stenosis or occlusion.    CTA HEAD:  No large vessel occlusion, significant stenosis orvascular abnormality   identified.    < end of copied text >     DEBORA WAHL  [id number]      HPI:  83 yo male with pmhx of BPH, HLD, HTN, hepatic artery aneurysm, melanoma presenting with altered mental status, weakness, and vomiting. History provided both by patient and wife Adam at bedside. They had a late dinner and pt had a scotch around 4:30pm, followed by another scotch at 6:30pm and half a glass of wine with dinner which was pasta and broccoli. Went to bed around 10pm - no symptoms at this time. Pt remembers waking up around 2am feeling "sick" and knelt down on the bathroom floor to vomit in the toilet. Denies falling. Unsure if he lost consciousness but remembers everything was "dark" and not sure if he had any vision changes. Arms and leg felt weak at the time. Adam noticed that pt had "garbled speech" and was having both slurred speech and difficulty getting out words. No facial droop observed. Denies chest pain, SOB, diarrhea, dysuria. No known sick contacts.     ALLERGIES:  No Known Allergies      CURRENT MEDICATIONS:  MEDICATIONS  (STANDING):  aspirin  chewable 81 milliGRAM(s) Oral daily  atorvastatin 80 milliGRAM(s) Oral at bedtime  enoxaparin Injectable 40 milliGRAM(s) SubCutaneous every 24 hours  influenza  Vaccine (HIGH DOSE) 0.5 milliLiter(s) IntraMuscular once  lactated ringers. 1000 milliLiter(s) (75 mL/Hr) IV Continuous <Continuous>  potassium chloride  10 mEq/100 mL IVPB 10 milliEquivalent(s) IV Intermittent every 1 hour    SOCIAL HISTORY:  social etoh use last drink last night 2 scotch and glass of wine      ROS:  All 10 systems reviewed and positives noted in HPI    OBJECTIVE:    VITAL SIGNS:  Vital Signs Last 24 Hrs  T(C): 36.3 (29 Apr 2025 08:01), Max: 36.9 (29 Apr 2025 06:26)  T(F): 97.4 (29 Apr 2025 08:01), Max: 98.4 (29 Apr 2025 06:26)  HR: 76 (29 Apr 2025 08:36) (68 - 94)  BP: 126/78 (29 Apr 2025 08:36) (114/88 - 126/78)  BP(mean): 88 (29 Apr 2025 03:30) (88 - 92)  RR: 18 (29 Apr 2025 08:01) (17 - 21)  SpO2: 98% (29 Apr 2025 08:36) (91% - 99%)    Parameters below as of 29 Apr 2025 08:36  Patient On (Oxygen Delivery Method): room air      PHYSICAL EXAM:  General: no distress  HEENT: sclera anicteric  Neck: supple, no carotid bruits b/l  CVS: JVP ~ 7 cm H20, RRR,  no murmurs/rubs  Chest: unlabored respirations, clear to auscultation b/l  Abdomen: non-distended  Extremities: no lower extremity edema b/l  Neuro: awake, alert & oriented x 3  Psych: normal affect      LABS:                        13.0   8.54  )-----------( 239      ( 29 Apr 2025 03:05 )             39.8     04-29    141  |  103  |  20  ----------------------------<  128[H]  3.2[L]   |  25  |  0.99    Ca    8.5      29 Apr 2025 03:05  Phos  3.6     04-29  Mg     2.1     04-29    TPro  6.5  /  Alb  3.3  /  TBili  0.2  /  DBili  x   /  AST  39  /  ALT  38  /  AlkPhos  62  04-29        PT/INR - ( 29 Apr 2025 03:05 )   PT: 10.6 sec;   INR: 0.90 ratio         PTT - ( 29 Apr 2025 03:05 )  PTT:25.5 sec      ECG: Sinus rhythm first deg block      < from: CT Brain Perfusion Maps Stroke (04.29.25 @ 03:19) >  CT PERFUSION:  Technical limitations: None.    Core infarction: 0 ml  Penumbra / tissue at risk for active ischemia: 0 ml    CTA NECK:  No evidence of significant stenosis or occlusion.    CTA HEAD:  No large vessel occlusion, significant stenosis orvascular abnormality   identified.

## 2025-04-30 ENCOUNTER — TRANSCRIPTION ENCOUNTER (OUTPATIENT)
Age: 85
End: 2025-04-30

## 2025-04-30 VITALS
HEART RATE: 63 BPM | RESPIRATION RATE: 17 BRPM | DIASTOLIC BLOOD PRESSURE: 70 MMHG | TEMPERATURE: 98 F | SYSTOLIC BLOOD PRESSURE: 107 MMHG | OXYGEN SATURATION: 95 %

## 2025-04-30 LAB
ANION GAP SERPL CALC-SCNC: 7 MMOL/L — SIGNIFICANT CHANGE UP (ref 5–17)
BUN SERPL-MCNC: 13 MG/DL — SIGNIFICANT CHANGE UP (ref 7–23)
CALCIUM SERPL-MCNC: 8.3 MG/DL — LOW (ref 8.4–10.5)
CHLORIDE SERPL-SCNC: 108 MMOL/L — SIGNIFICANT CHANGE UP (ref 96–108)
CO2 SERPL-SCNC: 29 MMOL/L — SIGNIFICANT CHANGE UP (ref 22–31)
CREAT SERPL-MCNC: 0.97 MG/DL — SIGNIFICANT CHANGE UP (ref 0.5–1.3)
EGFR: 78 ML/MIN/1.73M2 — SIGNIFICANT CHANGE UP
EGFR: 78 ML/MIN/1.73M2 — SIGNIFICANT CHANGE UP
GLUCOSE SERPL-MCNC: 92 MG/DL — SIGNIFICANT CHANGE UP (ref 70–99)
HCT VFR BLD CALC: 37 % — LOW (ref 39–50)
HGB BLD-MCNC: 12.1 G/DL — LOW (ref 13–17)
MAGNESIUM SERPL-MCNC: 1.9 MG/DL — SIGNIFICANT CHANGE UP (ref 1.6–2.6)
MCHC RBC-ENTMCNC: 26.7 PG — LOW (ref 27–34)
MCHC RBC-ENTMCNC: 32.7 G/DL — SIGNIFICANT CHANGE UP (ref 32–36)
MCV RBC AUTO: 81.5 FL — SIGNIFICANT CHANGE UP (ref 80–100)
NRBC BLD AUTO-RTO: 0 /100 WBCS — SIGNIFICANT CHANGE UP (ref 0–0)
PLATELET # BLD AUTO: 199 K/UL — SIGNIFICANT CHANGE UP (ref 150–400)
POTASSIUM SERPL-MCNC: 3.9 MMOL/L — SIGNIFICANT CHANGE UP (ref 3.5–5.3)
POTASSIUM SERPL-SCNC: 3.9 MMOL/L — SIGNIFICANT CHANGE UP (ref 3.5–5.3)
RBC # BLD: 4.54 M/UL — SIGNIFICANT CHANGE UP (ref 4.2–5.8)
RBC # FLD: 14.2 % — SIGNIFICANT CHANGE UP (ref 10.3–14.5)
SODIUM SERPL-SCNC: 144 MMOL/L — SIGNIFICANT CHANGE UP (ref 135–145)
WBC # BLD: 6.19 K/UL — SIGNIFICANT CHANGE UP (ref 3.8–10.5)
WBC # FLD AUTO: 6.19 K/UL — SIGNIFICANT CHANGE UP (ref 3.8–10.5)

## 2025-04-30 PROCEDURE — 85730 THROMBOPLASTIN TIME PARTIAL: CPT

## 2025-04-30 PROCEDURE — 70450 CT HEAD/BRAIN W/O DYE: CPT | Mod: MC

## 2025-04-30 PROCEDURE — 70496 CT ANGIOGRAPHY HEAD: CPT | Mod: MC

## 2025-04-30 PROCEDURE — 72125 CT NECK SPINE W/O DYE: CPT | Mod: MC

## 2025-04-30 PROCEDURE — 83735 ASSAY OF MAGNESIUM: CPT

## 2025-04-30 PROCEDURE — 36415 COLL VENOUS BLD VENIPUNCTURE: CPT

## 2025-04-30 PROCEDURE — 70551 MRI BRAIN STEM W/O DYE: CPT | Mod: MC

## 2025-04-30 PROCEDURE — 0042T: CPT | Mod: MC

## 2025-04-30 PROCEDURE — 85027 COMPLETE CBC AUTOMATED: CPT

## 2025-04-30 PROCEDURE — 80307 DRUG TEST PRSMV CHEM ANLYZR: CPT

## 2025-04-30 PROCEDURE — 83036 HEMOGLOBIN GLYCOSYLATED A1C: CPT

## 2025-04-30 PROCEDURE — 92610 EVALUATE SWALLOWING FUNCTION: CPT

## 2025-04-30 PROCEDURE — 99285 EMERGENCY DEPT VISIT HI MDM: CPT | Mod: 25

## 2025-04-30 PROCEDURE — 93005 ELECTROCARDIOGRAM TRACING: CPT

## 2025-04-30 PROCEDURE — 80053 COMPREHEN METABOLIC PANEL: CPT

## 2025-04-30 PROCEDURE — 97161 PT EVAL LOW COMPLEX 20 MIN: CPT

## 2025-04-30 PROCEDURE — 85610 PROTHROMBIN TIME: CPT

## 2025-04-30 PROCEDURE — 85379 FIBRIN DEGRADATION QUANT: CPT

## 2025-04-30 PROCEDURE — 80061 LIPID PANEL: CPT

## 2025-04-30 PROCEDURE — 81003 URINALYSIS AUTO W/O SCOPE: CPT

## 2025-04-30 PROCEDURE — 97116 GAIT TRAINING THERAPY: CPT

## 2025-04-30 PROCEDURE — 85025 COMPLETE CBC W/AUTO DIFF WBC: CPT

## 2025-04-30 PROCEDURE — 70498 CT ANGIOGRAPHY NECK: CPT | Mod: MC

## 2025-04-30 PROCEDURE — 70551 MRI BRAIN STEM W/O DYE: CPT | Mod: 26

## 2025-04-30 PROCEDURE — 99232 SBSQ HOSP IP/OBS MODERATE 35: CPT

## 2025-04-30 PROCEDURE — 82962 GLUCOSE BLOOD TEST: CPT

## 2025-04-30 PROCEDURE — C8929: CPT

## 2025-04-30 PROCEDURE — 87637 SARSCOV2&INF A&B&RSV AMP PRB: CPT

## 2025-04-30 PROCEDURE — 99239 HOSP IP/OBS DSCHRG MGMT >30: CPT

## 2025-04-30 PROCEDURE — 96361 HYDRATE IV INFUSION ADD-ON: CPT

## 2025-04-30 PROCEDURE — 84484 ASSAY OF TROPONIN QUANT: CPT

## 2025-04-30 PROCEDURE — 95812 EEG 41-60 MINUTES: CPT

## 2025-04-30 PROCEDURE — 84100 ASSAY OF PHOSPHORUS: CPT

## 2025-04-30 PROCEDURE — 71045 X-RAY EXAM CHEST 1 VIEW: CPT

## 2025-04-30 PROCEDURE — 80048 BASIC METABOLIC PNL TOTAL CA: CPT

## 2025-04-30 PROCEDURE — 96374 THER/PROPH/DIAG INJ IV PUSH: CPT

## 2025-04-30 PROCEDURE — 97165 OT EVAL LOW COMPLEX 30 MIN: CPT

## 2025-04-30 PROCEDURE — 96375 TX/PRO/DX INJ NEW DRUG ADDON: CPT

## 2025-04-30 PROCEDURE — 84443 ASSAY THYROID STIM HORMONE: CPT

## 2025-04-30 RX ADMIN — ENOXAPARIN SODIUM 40 MILLIGRAM(S): 100 INJECTION SUBCUTANEOUS at 10:51

## 2025-04-30 RX ADMIN — Medication 81 MILLIGRAM(S): at 11:51

## 2025-04-30 NOTE — PROGRESS NOTE ADULT - ASSESSMENT
83 yo male with pmhx of BPH, HLD, HTN presenting with altered mental status, weakness, and vomiting. pt states he felt too weak to get out of bed late last night/ early morning with HA and dizziness. denies any chest pain, shortness of breath or palpitations. Admitted for altered mental status, cardiology consulted to r/o CVA. pt currently at baseline    CTH/N and labs unremarkable  tele sinus rhythm, rate controlled  tte with normal EF, mild valvular disease  cont asa and statin  followup neuro reccs, awaiting MRI

## 2025-04-30 NOTE — DISCHARGE NOTE NURSING/CASE MANAGEMENT/SOCIAL WORK - FINANCIAL ASSISTANCE
[facility] provides services at a reduced cost to those who are determined to be eligible through [facility]’s financial assistance program. Information regarding [facility]’s financial assistance program can be found by going to [email or web address] or by calling [phone].

## 2025-04-30 NOTE — DISCHARGE NOTE PROVIDER - CARE PROVIDER_API CALL
Benny Manuel.  [facility] Medicine  [address] [place] [id number]  Phone: [phone]  Fax: [phone]  Follow Up Time:

## 2025-04-30 NOTE — DISCHARGE NOTE PROVIDER - NSDCCPCAREPLAN_GEN_ALL_CORE_FT
PRINCIPAL DISCHARGE DIAGNOSIS  Diagnosis: BPPV (benign paroxysmal positional vertigo)  Assessment and Plan of Treatment:   -you were evaluated by Neurology, had CAT scan and MRI of your brain and both imaging did not show a stroke  -you had an EEG which did not show seizure activity  -continue home Aspirin and Lipitor daily for stroke prevention  -follow up with your PCP, Dr. Manuel within 1 week      SECONDARY DISCHARGE DIAGNOSES  Diagnosis: Near syncope  Assessment and Plan of Treatment:   -you had an almost fainting episode, you were evaluated by Cardiology  -Echocardiogram and telemetry monitoring did not show any heart abnormalities  -follow up with your Cardiologist Dr Mar     PRINCIPAL DISCHARGE DIAGNOSIS  Diagnosis: BPPV (benign paroxysmal positional vertigo)  Assessment and Plan of Treatment: -you were evaluated by Neurology, had CAT scan and MRI of your brain and both imaging did not show a stroke  -you had an EEG which did not show seizure activity  -continue home Aspirin and Lipitor daily   -follow up with your PCP, Dr. Manuel within 1 week      SECONDARY DISCHARGE DIAGNOSES  Diagnosis: Near syncope  Assessment and Plan of Treatment:   -you had an almost fainting episode, you were evaluated by Cardiology  -Echocardiogram and telemetry monitoring did not show any heart abnormalities  -follow up with your Cardiologist Dr Mar

## 2025-04-30 NOTE — PROGRESS NOTE ADULT - SUBJECTIVE AND OBJECTIVE BOX
Patient is a 84y old  Male who presents with a chief complaint of stroke rule out (29 Apr 2025 08:59)      Patient seen and examined at bedside. No overnight events reported.     ALLERGIES:  No Known Allergies    MEDICATIONS  (STANDING):  aspirin  chewable 81 milliGRAM(s) Oral daily  atorvastatin 80 milliGRAM(s) Oral at bedtime  enoxaparin Injectable 40 milliGRAM(s) SubCutaneous every 24 hours  influenza  Vaccine (HIGH DOSE) 0.5 milliLiter(s) IntraMuscular once    MEDICATIONS  (PRN):  ondansetron Injectable 4 milliGRAM(s) IV Push every 6 hours PRN Nausea and/or Vomiting    Vital Signs Last 24 Hrs  T(F): 97.6 (30 Apr 2025 05:30), Max: 98.4 (29 Apr 2025 21:08)  HR: 64 (30 Apr 2025 05:30) (62 - 70)  BP: 121/79 (30 Apr 2025 05:30) (107/65 - 121/79)  RR: 16 (30 Apr 2025 05:30) (16 - 18)  SpO2: 95% (30 Apr 2025 05:30) (95% - 95%)  I&O's Summary    29 Apr 2025 07:01  -  30 Apr 2025 07:00  --------------------------------------------------------  IN: 675 mL / OUT: 0 mL / NET: 675 mL      PHYSICAL EXAM:  General: NAD, A/O x 3  ENT: No gross hearing impairment, Moist mucous membranes, no thrush  Neck: Supple, No JVD  Lungs: Clear to auscultation bilaterally, good air entry, non-labored breathing  Cardio: RRR, S1/S2, No murmur  Abdomen: Soft, Nontender, Nondistended; Bowel sounds present  Extremities: No calf tenderness, No cyanosis, No pitting edema  Psych: Appropriate mood and affect    LABS:                        12.1   6.19  )-----------( 199      ( 30 Apr 2025 07:08 )             37.0     04-30    144  |  108  |  13  ----------------------------<  92  3.9   |  29  |  0.97    Ca    8.3      30 Apr 2025 07:08  Phos  3.6     04-29  Mg     1.9     04-30    TPro  6.5  /  Alb  3.3  /  TBili  0.2  /  DBili  x   /  AST  39  /  ALT  38  /  AlkPhos  62  04-29          PT/INR - ( 29 Apr 2025 03:05 )   PT: 10.6 sec;   INR: 0.90 ratio         PTT - ( 29 Apr 2025 03:05 )  PTT:25.5 sec      CARDIAC MARKERS ( 29 Apr 2025 03:05 )  x     / 7.8 ng/L / x     / x     / x          04-29 Chol 154 mg/dL LDL -- HDL 65 mg/dL Trig 60 mg/dL  TSH 0.910   TSH with FT4 reflex --  Total T3 --                  Urinalysis Basic - ( 30 Apr 2025 07:08 )    Color: x / Appearance: x / SG: x / pH: x  Gluc: 92 mg/dL / Ketone: x  / Bili: x / Urobili: x   Blood: x / Protein: x / Nitrite: x   Leuk Esterase: x / RBC: x / WBC x   Sq Epi: x / Non Sq Epi: x / Bacteria: x          RADIOLOGY & ADDITIONAL TESTS:  < from: CT Brain Perfusion Maps Stroke (04.29.25 @ 03:19) >      IMPRESSION:    CT PERFUSION:  Technical limitations: None.    Core infarction: 0 ml  Penumbra / tissue at risk for active ischemia: 0 ml    CTA NECK:  No evidence of significant stenosis or occlusion.    CTA HEAD:  No large vessel occlusion, significant stenosis orvascular abnormality   identified.        --- End of Report ---    < end of copied text >  < from: CT Cervical Spine No Cont (04.29.25 @ 03:11) >    IMPRESSION:    No acute cervical spine fracture or traumatic malalignment.  Multilevel degenerative changes.    --- End of Report ---    < end of copied text >  < from: Xray Chest 1 View- PORTABLE-Urgent (Xray Chest 1 View- PORTABLE-Urgent .) (04.29.25 @ 03:46) >      IMPRESSION: No focal infiltrate or congestion. Heart is within normal   limits in its transthoracic diameter. Regional osseous structures   appropriate for age    < end of copied text >  < from: TTE Echo Complete w/ Contrast w/ Doppler (04.29.25 @ 08:38) >  _    CONCLUSIONS:     1. Left ventricular cavity is normal in size. Left ventricular wall   thickness is normal. Left ventricular systolic function is normal with an   ejection fraction of 65 % by Robbins's method of disks 64 % by 3D with an   ejection fraction visually estimated at 60 to 65 %.   2. Normal left ventricular diastolic function.   3. Mildly enlarged right ventricular cavity size and normal right   ventricular systolic function. Tricuspid annular plane systolic excursion   (TAPSE) is 2.9 cm (normal >=1.7 cm). Tricuspid annular tissue Doppler S'   is 10.7 cm/s (normal >10 cm/s).   4. Normal left and right atrial size.   5. Mild mitral valve leaflet calcification.   6. Structurally normal tricuspid valve with normal leaflet excursion.   Mild tricuspid regurgitation.   7. Trileaflet aortic valve.   8. Mild aortic valve leaflet calcification. No aortic valve stenosis.   9. Aortic root at the sinuses of Valsalva is normal in size, measuring   3.50 cm (indexed 1.79 cm/m²). Ascending aorta is normal in size,   measuring 3.40 cm (indexed 1.74 cm/m²).  10. The inferior vena cava is normal in size (normal <2.1cm) with normal   inspiratory collapse (normal >50%) consistent with normal right atrial   pressure (  3, range 0-5mmHg).  11. Estimated pulmonary artery systolic pressure is 31 mmHg, consistent   with normal pulmonary artery pressure.  12. No pericardial effusion seen.      < end of copied text >    Care Discussed with Consultants/Other Providers:    Patient is a 84y old  Male who presents with a chief complaint of stroke rule out (29 Apr 2025 08:59)      Patient seen and examined at bedside. No overnight events reported.  Pt with no dizziness, no N/V.  He feels improved today.     ALLERGIES:  No Known Allergies    MEDICATIONS  (STANDING):  aspirin  chewable 81 milliGRAM(s) Oral daily  atorvastatin 80 milliGRAM(s) Oral at bedtime  enoxaparin Injectable 40 milliGRAM(s) SubCutaneous every 24 hours  influenza  Vaccine (HIGH DOSE) 0.5 milliLiter(s) IntraMuscular once    MEDICATIONS  (PRN):  ondansetron Injectable 4 milliGRAM(s) IV Push every 6 hours PRN Nausea and/or Vomiting    Vital Signs Last 24 Hrs  T(F): 97.6 (30 Apr 2025 05:30), Max: 98.4 (29 Apr 2025 21:08)  HR: 64 (30 Apr 2025 05:30) (62 - 70)  BP: 121/79 (30 Apr 2025 05:30) (107/65 - 121/79)  RR: 16 (30 Apr 2025 05:30) (16 - 18)  SpO2: 95% (30 Apr 2025 05:30) (95% - 95%)  I&O's Summary    29 Apr 2025 07:01  -  30 Apr 2025 07:00  --------------------------------------------------------  IN: 675 mL / OUT: 0 mL / NET: 675 mL      PHYSICAL EXAM:  General: NAD, A/O x 3  ENT: No gross hearing impairment, Moist mucous membranes, no thrush  Neck: Supple, No JVD  Lungs: Clear to auscultation bilaterally, good air entry, non-labored breathing  Cardio: RRR, S1/S2, No murmur  Abdomen: Soft, Nontender, Nondistended; Bowel sounds present  Extremities: No calf tenderness, No cyanosis, No pitting edema  Psych: Appropriate mood and affect    LABS:                        12.1   6.19  )-----------( 199      ( 30 Apr 2025 07:08 )             37.0     04-30    144  |  108  |  13  ----------------------------<  92  3.9   |  29  |  0.97    Ca    8.3      30 Apr 2025 07:08  Phos  3.6     04-29  Mg     1.9     04-30    TPro  6.5  /  Alb  3.3  /  TBili  0.2  /  DBili  x   /  AST  39  /  ALT  38  /  AlkPhos  62  04-29          PT/INR - ( 29 Apr 2025 03:05 )   PT: 10.6 sec;   INR: 0.90 ratio         PTT - ( 29 Apr 2025 03:05 )  PTT:25.5 sec      CARDIAC MARKERS ( 29 Apr 2025 03:05 )  x     / 7.8 ng/L / x     / x     / x          04-29 Chol 154 mg/dL LDL -- HDL 65 mg/dL Trig 60 mg/dL  TSH 0.910   TSH with FT4 reflex --  Total T3 --                  Urinalysis Basic - ( 30 Apr 2025 07:08 )    Color: x / Appearance: x / SG: x / pH: x  Gluc: 92 mg/dL / Ketone: x  / Bili: x / Urobili: x   Blood: x / Protein: x / Nitrite: x   Leuk Esterase: x / RBC: x / WBC x   Sq Epi: x / Non Sq Epi: x / Bacteria: x          RADIOLOGY & ADDITIONAL TESTS:  < from: CT Brain Perfusion Maps Stroke (04.29.25 @ 03:19) >      IMPRESSION:    CT PERFUSION:  Technical limitations: None.    Core infarction: 0 ml  Penumbra / tissue at risk for active ischemia: 0 ml    CTA NECK:  No evidence of significant stenosis or occlusion.    CTA HEAD:  No large vessel occlusion, significant stenosis orvascular abnormality   identified.        --- End of Report ---    < end of copied text >  < from: CT Cervical Spine No Cont (04.29.25 @ 03:11) >    IMPRESSION:    No acute cervical spine fracture or traumatic malalignment.  Multilevel degenerative changes.    --- End of Report ---    < end of copied text >  < from: Xray Chest 1 View- PORTABLE-Urgent (Xray Chest 1 View- PORTABLE-Urgent .) (04.29.25 @ 03:46) >      IMPRESSION: No focal infiltrate or congestion. Heart is within normal   limits in its transthoracic diameter. Regional osseous structures   appropriate for age    < end of copied text >  < from: TTE Echo Complete w/ Contrast w/ Doppler (04.29.25 @ 08:38) >  _    CONCLUSIONS:     1. Left ventricular cavity is normal in size. Left ventricular wall   thickness is normal. Left ventricular systolic function is normal with an   ejection fraction of 65 % by Robbins's method of disks 64 % by 3D with an   ejection fraction visually estimated at 60 to 65 %.   2. Normal left ventricular diastolic function.   3. Mildly enlarged right ventricular cavity size and normal right   ventricular systolic function. Tricuspid annular plane systolic excursion   (TAPSE) is 2.9 cm (normal >=1.7 cm). Tricuspid annular tissue Doppler S'   is 10.7 cm/s (normal >10 cm/s).   4. Normal left and right atrial size.   5. Mild mitral valve leaflet calcification.   6. Structurally normal tricuspid valve with normal leaflet excursion.   Mild tricuspid regurgitation.   7. Trileaflet aortic valve.   8. Mild aortic valve leaflet calcification. No aortic valve stenosis.   9. Aortic root at the sinuses of Valsalva is normal in size, measuring   3.50 cm (indexed 1.79 cm/m²). Ascending aorta is normal in size,   measuring 3.40 cm (indexed 1.74 cm/m²).  10. The inferior vena cava is normal in size (normal <2.1cm) with normal   inspiratory collapse (normal >50%) consistent with normal right atrial   pressure (  3, range 0-5mmHg).  11. Estimated pulmonary artery systolic pressure is 31 mmHg, consistent   with normal pulmonary artery pressure.  12. No pericardial effusion seen.      < end of copied text >    Care Discussed with Consultants/Other Providers:

## 2025-04-30 NOTE — DISCHARGE NOTE PROVIDER - NSDCFUSCHEDAPPT_GEN_ALL_CORE_FT
Marv Mar  [facility] Physician [facility]  CARDIOLOGY [address]  Scheduled Appointment: 05/21/2025

## 2025-04-30 NOTE — DISCHARGE NOTE PROVIDER - HOSPITAL COURSE
HPI:  85 yo male with pmhx of BPH, HLD, HTN, hepatic artery aneurysm, melanoma presenting with AMS, weakness, and vomiting. History provided both by patient and wife Adam at bedside. They had a late dinner and pt had a scotch around 4:30pm, followed by another scotch at 6:30pm and half a glass of wine with dinner which was pasta and broccoli. Went to bed around 10pm - no symptoms at this time. Pt remembers waking up around 2am feeling "sick" and knelt down on the bathroom floor to vomit in the toilet. Denies falling. Unsure if he lost consciousness but remembers everything was "dark" and not sure if he had any vision changes. Arms and leg felt weak at the time. Adam noticed that pt had "garbled speech" and was having both slurred speech and difficulty getting out words. No facial droop observed. Denies chest pain, SOB, diarrhea, dysuria. No known sick contacts. Denies history of stroke/TIA, MI, DVT/PE, seizures, and alcohol withdrawal.    ED course-  Afebrile, HR 72-94, -124/70-86, RR 18-20, SpO2 94% > 91%, now 99% on 3L NC  NIHSS 12, improved to 0  No leukocytosis, K 3.2, trop 7.8, EtOH level 16  CT/CTA head neck unremarkable, CXR unremarkable  S/p 1L NS, IV Zofran 4 mg, IV KCl 10 mEq (29 Apr 2025 04:44)    Hospital Course Summary: Pt admitted for AMS, possible syncope vs CVA.   Neurology, Cardiology were both consulted. EEG neg, lipid panel and HGBA1C noted, pt passed bedside swallow eval, pt is eating well.  He was on ASA and high intensity statin during hospital stay.  CT/CTA of head/neck neg, Echo: EF 65%.  Per Cardiology -- no further testing recommended, trops neg, overnight tele pt in SR.  Alcohol level: 16, +THC on tox screen.  PT/OT/PM&R evals all noted; rec is for D/C home, no inpatient needs.  MRI of brain on 4/20 with no acute bleed, mass, ischemia or infarct.  Pt clinically stable for D/C home.     FULL CODE    Discharging Provider:  NEHA Douglas   Contact Info: [phone] Please call with any questions or concerns.    PCP Dr. Manuel notified of discharge

## 2025-04-30 NOTE — PROGRESS NOTE ADULT - NS ATTEND AMEND GEN_ALL_CORE FT
Patient seen and examined. Tele stable, echo noted. No further work up planned from cardiac perspective. Treatment per neuro. Call with questions.
Patient seen and examined.     Suspect BPPV  neuro input appreciated  MRI unremarkable  Patient walking around the unit. Feels at baseline. Spouse at bedside  DC home today. Patient and family agreeable.     time spent 40 mins

## 2025-04-30 NOTE — PROGRESS NOTE ADULT - ASSESSMENT
85 yo male with pmhx of BPH, HLD, HTN, hepatic artery aneurysm, melanoma presenting with AMS, weakness, and vomiting.    He is unclear if he lost consciousness but remembers everything was "dark" and not sure if he had any vision changes.  His wife noticed that pt had "garbled speech" and was having both slurred speech and difficulty getting out word out, he is admitted for AMS, possible syncope vs CVA.     #AMS, ?syncope  -Neuro consulted, check MRI of brain  -CT/CTA of head/neck neg  -check EEG  -Cardiology consulted; check Echo with bubble study, trops neg  -Alcohol level: 16, +THC on tox scrren    #hx of BPH, HLD, HTN  -cont home meds    Dispo:  wife updated at bedside.   83 yo male with pmhx of BPH, HLD, HTN, hepatic artery aneurysm, melanoma presenting with AMS, weakness, and vomiting.    He is unclear if he lost consciousness but remembers everything was "dark" and not sure if he had any vision changes.  His wife noticed that pt had "garbled speech" and was having both slurred speech and difficulty getting out word out, he is admitted for AMS, possible syncope vs CVA.     #AMS, ?syncope  -Neuro consulted, rec is for MRI of brain-- pending for today, if negative can go home  -EEG neg  -lipid panel and HGBA1C noted  -he passed bedside swallow eval, pt is eating well  -cont ASA and high intensity statin for now  -CT/CTA of head/neck neg  -Echo: EF 65%  -Cardiology consulted; no further testing recommended  -trops neg, overnight tele pt in SR  -Alcohol level: 16, +THC on tox screen  -PT/OT/PM&R evals all noted; rec is for D/C home, no inpatient needs    #hx of BPH, HLD, HTN  -cont home meds, monitor vitals    DVT ppx - lovenox    FULL CODE    Dispo:  wife updated at bedside, probable D/C home today if MRI neg

## 2025-04-30 NOTE — DISCHARGE NOTE NURSING/CASE MANAGEMENT/SOCIAL WORK - PATIENT PORTAL LINK FT
You can access the FollowMyHealth Patient Portal offered by [facility] by registering at the following website: [email or web address]. By joining [email or web address] FollowMyHealth portal, you will also be able to view your health information using other applications (apps) compatible with our system.

## 2025-04-30 NOTE — DISCHARGE NOTE PROVIDER - NSDCMRMEDTOKEN_GEN_ALL_CORE_FT
Aspirin Enteric Coated 81 mg oral delayed release tablet: 1 tab(s) orally  CoQ10 300 mg oral capsule: 1 cap(s) orally once a day  lisinopril-hydrochlorothiazide 20 mg-12.5 mg oral tablet: 1 tab(s) orally once a day  Multiple Vitamins oral tablet: 1 tab(s) orally once a day  PreserVision AREDS 2 oral capsule: orally 2 times a day  simvastatin 40 mg oral tablet: 1 tab(s) orally once a day (at bedtime)  Turmeric 500 mg oral capsule: 1 cap(s) orally once a day   Aspirin Enteric Coated 81 mg oral delayed release tablet: 1 tab(s) orally  CoQ10 300 mg oral capsule: 1 cap(s) orally once a day  lisinopril-hydrochlorothiazide 20 mg-12.5 mg oral tablet: 1 tab(s) orally once a day  Multiple Vitamins oral tablet: 1 tab(s) orally once a day  Physical Therapy: Re:  Deconditioned , 2x weekly  PreserVision AREDS 2 oral capsule: orally 2 times a day  simvastatin 40 mg oral tablet: 1 tab(s) orally once a day (at bedtime)  Turmeric 500 mg oral capsule: 1 cap(s) orally once a day

## 2025-04-30 NOTE — PROGRESS NOTE ADULT - SUBJECTIVE AND OBJECTIVE BOX
[facility]  [id number]    Chief Complaint: r/o CVA  Interval events: pt seen and examined, labs and chart reviewed. no cardiac complaints. sinus 60s on tele    ALLERGIES:  No Known Allergies      CURRENT MEDICATIONS:  MEDICATIONS  (STANDING):  aspirin  chewable 81 milliGRAM(s) Oral daily  atorvastatin 80 milliGRAM(s) Oral at bedtime  enoxaparin Injectable 40 milliGRAM(s) SubCutaneous every 24 hours  influenza  Vaccine (HIGH DOSE) 0.5 milliLiter(s) IntraMuscular once  lactated ringers. 1000 milliLiter(s) (75 mL/Hr) IV Continuous <Continuous>  potassium chloride  10 mEq/100 mL IVPB 10 milliEquivalent(s) IV Intermittent every 1 hour    SOCIAL HISTORY:  social etoh use last drink last night 2 scotch and glass of wine      ROS:  All 10 systems reviewed and positives noted in HPI    OBJECTIVE:    VITAL SIGNS:  Vital Signs Last 24 Hrs  T(C): 36.3 (29 Apr 2025 08:01), Max: 36.9 (29 Apr 2025 06:26)  T(F): 97.4 (29 Apr 2025 08:01), Max: 98.4 (29 Apr 2025 06:26)  HR: 76 (29 Apr 2025 08:36) (68 - 94)  BP: 126/78 (29 Apr 2025 08:36) (114/88 - 126/78)  BP(mean): 88 (29 Apr 2025 03:30) (88 - 92)  RR: 18 (29 Apr 2025 08:01) (17 - 21)  SpO2: 98% (29 Apr 2025 08:36) (91% - 99%)    Parameters below as of 29 Apr 2025 08:36  Patient On (Oxygen Delivery Method): room air      PHYSICAL EXAM:  General: no distress  HEENT: sclera anicteric  Neck: supple, no carotid bruits b/l  CVS: JVP ~ 7 cm H20, RRR,  no murmurs/rubs  Chest: unlabored respirations, clear to auscultation b/l  Abdomen: non-distended  Extremities: no lower extremity edema b/l  Neuro: awake, alert & oriented x 3  Psych: normal affect      LABS:                        13.0   8.54  )-----------( 239      ( 29 Apr 2025 03:05 )             39.8     04-29    141  |  103  |  20  ----------------------------<  128[H]  3.2[L]   |  25  |  0.99    Ca    8.5      29 Apr 2025 03:05  Phos  3.6     04-29  Mg     2.1     04-29    TPro  6.5  /  Alb  3.3  /  TBili  0.2  /  DBili  x   /  AST  39  /  ALT  38  /  AlkPhos  62  04-29        PT/INR - ( 29 Apr 2025 03:05 )   PT: 10.6 sec;   INR: 0.90 ratio         PTT - ( 29 Apr 2025 03:05 )  PTT:25.5 sec      ECG: Sinus rhythm first deg block    < from: TTE Echo Complete w/ Contrast w/ Doppler (04.29.25 @ 08:38) >   1. Left ventricular cavity is normal in size. Left ventricular wall   thickness is normal. Left ventricular systolic function is normal with an   ejection fraction of 65 % by Robbins's method of disks 64 % by 3D with an   ejection fraction visually estimated at 60 to 65 %.   2. Normal left ventricular diastolic function.   3. Mildly enlarged right ventricular cavity size and normal right   ventricular systolic function. Tricuspid annular plane systolic excursion   (TAPSE) is 2.9 cm (normal >=1.7 cm). Tricuspid annular tissue Doppler S'   is 10.7 cm/s (normal >10 cm/s).   4. Normal left and right atrial size.   5. Mild mitral valve leaflet calcification.   6. Structurally normal tricuspid valve with normal leaflet excursion.   Mild tricuspid regurgitation.   7. Trileaflet aortic valve.   8. Mild aortic valve leaflet calcification. No aortic valve stenosis.   9. Aortic root at the sinuses of Valsalva is normal in size, measuring   3.50 cm (indexed 1.79 cm/m²). Ascending aorta is normal in size,   measuring 3.40 cm (indexed 1.74 cm/m²).  10. The inferior vena cava is normal in size (normal <2.1cm) with normal   inspiratory collapse (normal >50%) consistent with normal right atrial   pressure (  3, range 0-5mmHg).  11. Estimated pulmonary artery systolic pressure is 31 mmHg, consistent   with normal pulmonary artery pressure.  12. No pericardial effusion seen.      < from: CT Brain Perfusion Maps Stroke (04.29.25 @ 03:19) >  CT PERFUSION:  Technical limitations: None.    Core infarction: 0 ml  Penumbra / tissue at risk for active ischemia: 0 ml    CTA NECK:  No evidence of significant stenosis or occlusion.    CTA HEAD:  No large vessel occlusion, significant stenosis orvascular abnormality   identified.

## 2025-05-03 ENCOUNTER — NON-APPOINTMENT (OUTPATIENT)
Age: 85
End: 2025-05-03

## 2025-05-05 ENCOUNTER — NON-APPOINTMENT (OUTPATIENT)
Age: 85
End: 2025-05-05

## 2025-05-05 ENCOUNTER — APPOINTMENT (OUTPATIENT)
Dept: CARDIOLOGY | Facility: CLINIC | Age: 85
End: 2025-05-05
Payer: MEDICARE

## 2025-05-05 VITALS
RESPIRATION RATE: 19 BRPM | BODY MASS INDEX: 23.06 KG/M2 | HEART RATE: 67 BPM | OXYGEN SATURATION: 97 % | WEIGHT: 174 LBS | SYSTOLIC BLOOD PRESSURE: 120 MMHG | DIASTOLIC BLOOD PRESSURE: 90 MMHG | HEIGHT: 73 IN

## 2025-05-05 DIAGNOSIS — E78.5 HYPERLIPIDEMIA, UNSPECIFIED: ICD-10-CM

## 2025-05-05 DIAGNOSIS — Z92.29 PERSONAL HISTORY OF OTHER DRUG THERAPY: ICD-10-CM

## 2025-05-05 DIAGNOSIS — I10 ESSENTIAL (PRIMARY) HYPERTENSION: ICD-10-CM

## 2025-05-05 PROCEDURE — 99215 OFFICE O/P EST HI 40 MIN: CPT | Mod: 25

## 2025-05-05 PROCEDURE — 93000 ELECTROCARDIOGRAM COMPLETE: CPT

## 2025-05-08 ENCOUNTER — APPOINTMENT (OUTPATIENT)
Dept: FAMILY MEDICINE | Facility: CLINIC | Age: 85
End: 2025-05-08
Payer: MEDICARE

## 2025-05-08 VITALS — SYSTOLIC BLOOD PRESSURE: 120 MMHG | HEART RATE: 68 BPM | DIASTOLIC BLOOD PRESSURE: 70 MMHG | RESPIRATION RATE: 20 BRPM

## 2025-05-08 DIAGNOSIS — Z86.73 PERSONAL HISTORY OF TRANSIENT ISCHEMIC ATTACK (TIA), AND CEREBRAL INFARCTION W/OUT RESIDUAL DEFICITS: ICD-10-CM

## 2025-05-08 PROCEDURE — 99495 TRANSJ CARE MGMT MOD F2F 14D: CPT

## 2025-05-26 ENCOUNTER — NON-APPOINTMENT (OUTPATIENT)
Age: 85
End: 2025-05-26

## 2025-07-31 NOTE — H&P PST ADULT - NSWEIGHTCALCTOOLDRUG_GEN_A_CORE
Physical Therapy    Patient not seen in therapy.     On hold due to medical condition    Transitioning to hospice. Physical Therapy will sign off at this time.        OBJECTIVE                             Therapy procedure time and total treatment time can be found documented on the Time Entry flowsheet    used

## 2025-08-19 ENCOUNTER — APPOINTMENT (OUTPATIENT)
Dept: CARDIOLOGY | Facility: CLINIC | Age: 85
End: 2025-08-19
Payer: MEDICARE

## 2025-08-19 ENCOUNTER — APPOINTMENT (OUTPATIENT)
Dept: FAMILY MEDICINE | Facility: CLINIC | Age: 85
End: 2025-08-19
Payer: MEDICARE

## 2025-08-19 VITALS
RESPIRATION RATE: 20 BRPM | OXYGEN SATURATION: 96 % | HEART RATE: 67 BPM | SYSTOLIC BLOOD PRESSURE: 106 MMHG | HEIGHT: 73 IN | WEIGHT: 166 LBS | DIASTOLIC BLOOD PRESSURE: 69 MMHG | BODY MASS INDEX: 22 KG/M2

## 2025-08-19 VITALS
DIASTOLIC BLOOD PRESSURE: 70 MMHG | WEIGHT: 166 LBS | SYSTOLIC BLOOD PRESSURE: 122 MMHG | BODY MASS INDEX: 22 KG/M2 | OXYGEN SATURATION: 96 % | HEIGHT: 73 IN | HEART RATE: 83 BPM

## 2025-08-19 PROCEDURE — 99213 OFFICE O/P EST LOW 20 MIN: CPT | Mod: 25

## 2025-08-19 PROCEDURE — 99214 OFFICE O/P EST MOD 30 MIN: CPT

## 2025-08-19 PROCEDURE — G2211 COMPLEX E/M VISIT ADD ON: CPT

## 2025-08-19 PROCEDURE — 93000 ELECTROCARDIOGRAM COMPLETE: CPT

## 2025-08-19 RX ORDER — KRILL/OM-3/DHA/EPA/PHOSPHO/AST 1000-230MG
81 CAPSULE ORAL
Qty: 90 | Refills: 3 | Status: ACTIVE | COMMUNITY
Start: 2025-08-19

## 2025-08-22 ENCOUNTER — APPOINTMENT (OUTPATIENT)
Dept: SURGERY | Facility: CLINIC | Age: 85
End: 2025-08-22
Payer: MEDICARE

## 2025-08-22 DIAGNOSIS — Z92.29 PERSONAL HISTORY OF OTHER DRUG THERAPY: ICD-10-CM

## 2025-08-22 DIAGNOSIS — K80.50 CALCULUS OF BILE DUCT W/OUT CHOLANGITIS OR CHOLECYSTITIS W/OUT OBSTRUCTION: ICD-10-CM

## 2025-08-22 DIAGNOSIS — R07.89 OTHER CHEST PAIN: ICD-10-CM

## 2025-08-22 DIAGNOSIS — Z80.8 FAMILY HISTORY OF MALIGNANT NEOPLASM OF OTHER ORGANS OR SYSTEMS: ICD-10-CM

## 2025-08-22 DIAGNOSIS — Z85.820 PERSONAL HISTORY OF MALIGNANT MELANOMA OF SKIN: ICD-10-CM

## 2025-08-22 DIAGNOSIS — Z87.19 PERSONAL HISTORY OF OTHER DISEASES OF THE DIGESTIVE SYSTEM: ICD-10-CM

## 2025-08-22 PROCEDURE — 99203 OFFICE O/P NEW LOW 30 MIN: CPT

## 2025-08-22 RX ORDER — HYDROCORTISONE 25 MG/ML
2.5 LOTION TOPICAL
Qty: 118 | Refills: 0 | Status: ACTIVE | COMMUNITY
Start: 2025-06-04

## 2025-08-25 ENCOUNTER — APPOINTMENT (OUTPATIENT)
Dept: FAMILY MEDICINE | Facility: CLINIC | Age: 85
End: 2025-08-25
Payer: MEDICARE

## 2025-08-25 ENCOUNTER — OUTPATIENT (OUTPATIENT)
Dept: OUTPATIENT SERVICES | Facility: HOSPITAL | Age: 85
LOS: 1 days | End: 2025-08-25
Payer: MEDICARE

## 2025-08-25 ENCOUNTER — APPOINTMENT (OUTPATIENT)
Dept: ULTRASOUND IMAGING | Facility: HOSPITAL | Age: 85
End: 2025-08-25
Payer: MEDICARE

## 2025-08-25 VITALS
HEART RATE: 81 BPM | HEIGHT: 73 IN | BODY MASS INDEX: 21.47 KG/M2 | OXYGEN SATURATION: 97 % | WEIGHT: 162 LBS | SYSTOLIC BLOOD PRESSURE: 100 MMHG | DIASTOLIC BLOOD PRESSURE: 66 MMHG | RESPIRATION RATE: 18 BRPM

## 2025-08-25 DIAGNOSIS — Z98.890 OTHER SPECIFIED POSTPROCEDURAL STATES: Chronic | ICD-10-CM

## 2025-08-25 DIAGNOSIS — Z01.818 ENCOUNTER FOR OTHER PREPROCEDURAL EXAMINATION: ICD-10-CM

## 2025-08-25 DIAGNOSIS — E78.5 HYPERLIPIDEMIA, UNSPECIFIED: ICD-10-CM

## 2025-08-25 DIAGNOSIS — Z00.8 ENCOUNTER FOR OTHER GENERAL EXAMINATION: ICD-10-CM

## 2025-08-25 DIAGNOSIS — I10 ESSENTIAL (PRIMARY) HYPERTENSION: ICD-10-CM

## 2025-08-25 PROCEDURE — 76700 US EXAM ABDOM COMPLETE: CPT | Mod: 26

## 2025-08-25 PROCEDURE — 99214 OFFICE O/P EST MOD 30 MIN: CPT

## 2025-08-25 PROCEDURE — 76700 US EXAM ABDOM COMPLETE: CPT

## 2025-08-26 ENCOUNTER — OUTPATIENT (OUTPATIENT)
Dept: OUTPATIENT SERVICES | Facility: HOSPITAL | Age: 85
LOS: 1 days | End: 2025-08-26
Payer: MEDICARE

## 2025-08-26 ENCOUNTER — APPOINTMENT (OUTPATIENT)
Dept: MRI IMAGING | Facility: IMAGING CENTER | Age: 85
End: 2025-08-26
Payer: MEDICARE

## 2025-08-26 ENCOUNTER — APPOINTMENT (OUTPATIENT)
Dept: GASTROENTEROLOGY | Facility: CLINIC | Age: 85
End: 2025-08-26
Payer: MEDICARE

## 2025-08-26 VITALS
HEART RATE: 75 BPM | DIASTOLIC BLOOD PRESSURE: 70 MMHG | BODY MASS INDEX: 21.47 KG/M2 | SYSTOLIC BLOOD PRESSURE: 103 MMHG | RESPIRATION RATE: 18 BRPM | TEMPERATURE: 98.5 F | WEIGHT: 162 LBS | HEIGHT: 73 IN | OXYGEN SATURATION: 97 %

## 2025-08-26 DIAGNOSIS — Z98.890 OTHER SPECIFIED POSTPROCEDURAL STATES: Chronic | ICD-10-CM

## 2025-08-26 DIAGNOSIS — R79.89 OTHER SPECIFIED ABNORMAL FINDINGS OF BLOOD CHEMISTRY: ICD-10-CM

## 2025-08-26 DIAGNOSIS — K80.20 CALCULUS OF GALLBLADDER WITHOUT CHOLECYSTITIS WITHOUT OBSTRUCTION: ICD-10-CM

## 2025-08-26 DIAGNOSIS — K80.20 CALCULUS OF GALLBLADDER W/OUT CHOLECYSTITIS W/OUT OBSTRUCTION: ICD-10-CM

## 2025-08-26 DIAGNOSIS — N40.0 BENIGN PROSTATIC HYPERPLASIA WITHOUT LOWER URINARY TRACT SYMPTOMS: Chronic | ICD-10-CM

## 2025-08-26 PROCEDURE — 74183 MRI ABD W/O CNTR FLWD CNTR: CPT

## 2025-08-26 PROCEDURE — 74183 MRI ABD W/O CNTR FLWD CNTR: CPT | Mod: 26

## 2025-08-26 PROCEDURE — A9585: CPT

## 2025-08-26 PROCEDURE — 99214 OFFICE O/P EST MOD 30 MIN: CPT

## 2025-08-27 ENCOUNTER — APPOINTMENT (OUTPATIENT)
Dept: GASTROENTEROLOGY | Facility: HOSPITAL | Age: 85
End: 2025-08-27

## 2025-08-27 ENCOUNTER — NON-APPOINTMENT (OUTPATIENT)
Age: 85
End: 2025-08-27

## 2025-08-27 ENCOUNTER — OUTPATIENT (OUTPATIENT)
Dept: OUTPATIENT SERVICES | Facility: HOSPITAL | Age: 85
LOS: 1 days | End: 2025-08-27
Payer: MEDICARE

## 2025-08-27 ENCOUNTER — TRANSCRIPTION ENCOUNTER (OUTPATIENT)
Age: 85
End: 2025-08-27
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
## 2025-08-27 DIAGNOSIS — Z13.810 ENCOUNTER FOR SCREENING FOR UPPER GASTROINTESTINAL DISORDER: ICD-10-CM

## 2025-08-27 LAB
ALBUMIN SERPL ELPH-MCNC: 3.9 G/DL
ALP BLD-CCNC: 226 U/L
ALT SERPL-CCNC: 229 U/L
ANION GAP SERPL CALC-SCNC: 16 MMOL/L
AST SERPL-CCNC: 89 U/L
BASOPHILS # BLD AUTO: 0.03 K/UL
BASOPHILS NFR BLD AUTO: 0.4 %
BILIRUB SERPL-MCNC: 0.8 MG/DL
BUN SERPL-MCNC: 15 MG/DL
CALCIUM SERPL-MCNC: 9.3 MG/DL
CHLORIDE SERPL-SCNC: 100 MMOL/L
CO2 SERPL-SCNC: 23 MMOL/L
CREAT SERPL-MCNC: 0.92 MG/DL
EGFRCR SERPLBLD CKD-EPI 2021: 83 ML/MIN/1.73M2
EOSINOPHIL # BLD AUTO: 0.14 K/UL
EOSINOPHIL NFR BLD AUTO: 1.7 %
GLUCOSE SERPL-MCNC: 67 MG/DL
HCT VFR BLD CALC: 44.9 %
HGB BLD-MCNC: 14.4 G/DL
IMM GRANULOCYTES NFR BLD AUTO: 0.5 %
LYMPHOCYTES # BLD AUTO: 2.44 K/UL
LYMPHOCYTES NFR BLD AUTO: 29.7 %
MAN DIFF?: NORMAL
MCHC RBC-ENTMCNC: 26.8 PG
MCHC RBC-ENTMCNC: 32.1 G/DL
MCV RBC AUTO: 83.6 FL
MONOCYTES # BLD AUTO: 0.59 K/UL
MONOCYTES NFR BLD AUTO: 7.2 %
NEUTROPHILS # BLD AUTO: 4.97 K/UL
NEUTROPHILS NFR BLD AUTO: 60.5 %
PLATELET # BLD AUTO: 268 K/UL
POTASSIUM SERPL-SCNC: 4.1 MMOL/L
PROT SERPL-MCNC: 6.7 G/DL
RBC # BLD: 5.37 M/UL
RBC # FLD: 14.1 %
SODIUM SERPL-SCNC: 139 MMOL/L
WBC # FLD AUTO: 8.21 K/UL

## 2025-08-27 PROCEDURE — 43264 ERCP REMOVE DUCT CALCULI: CPT | Mod: 59

## 2025-08-27 PROCEDURE — 88305 TISSUE EXAM BY PATHOLOGIST: CPT

## 2025-08-27 PROCEDURE — 74330 X-RAY BILE/PANC ENDOSCOPY: CPT

## 2025-08-27 PROCEDURE — 43239 EGD BIOPSY SINGLE/MULTIPLE: CPT

## 2025-08-27 PROCEDURE — 88305 TISSUE EXAM BY PATHOLOGIST: CPT | Mod: 26

## 2025-08-27 PROCEDURE — C1773: CPT

## 2025-08-27 PROCEDURE — 43262 ENDO CHOLANGIOPANCREATOGRAPH: CPT

## 2025-08-27 PROCEDURE — 43239 EGD BIOPSY SINGLE/MULTIPLE: CPT | Mod: 59

## 2025-08-27 PROCEDURE — 43264 ERCP REMOVE DUCT CALCULI: CPT

## 2025-08-27 DEVICE — IMPLANTABLE DEVICE: Type: IMPLANTABLE DEVICE | Status: FUNCTIONAL

## 2025-08-27 DEVICE — HYDRATOME 44: Type: IMPLANTABLE DEVICE | Status: FUNCTIONAL

## 2025-08-27 RX ORDER — INDOMETHACIN 50 MG
100 CAPSULE ORAL ONCE
Refills: 0 | Status: COMPLETED | OUTPATIENT
Start: 2025-08-27 | End: 2025-08-27

## 2025-08-27 RX ADMIN — Medication 100 MILLIGRAM(S): at 15:11

## 2025-08-28 ENCOUNTER — RESULT REVIEW (OUTPATIENT)
Age: 85
End: 2025-08-28

## 2025-08-28 ENCOUNTER — APPOINTMENT (OUTPATIENT)
Dept: SURGERY | Facility: HOSPITAL | Age: 85
End: 2025-08-28

## 2025-08-28 ENCOUNTER — TRANSCRIPTION ENCOUNTER (OUTPATIENT)
Age: 85
End: 2025-08-28

## 2025-08-28 ENCOUNTER — OUTPATIENT (OUTPATIENT)
Dept: OUTPATIENT SERVICES | Facility: HOSPITAL | Age: 85
LOS: 1 days | End: 2025-08-28
Payer: MEDICARE

## 2025-08-28 VITALS
HEART RATE: 70 BPM | WEIGHT: 160.94 LBS | DIASTOLIC BLOOD PRESSURE: 64 MMHG | HEIGHT: 73 IN | TEMPERATURE: 98 F | RESPIRATION RATE: 16 BRPM | SYSTOLIC BLOOD PRESSURE: 100 MMHG | OXYGEN SATURATION: 95 %

## 2025-08-28 VITALS
TEMPERATURE: 98 F | HEART RATE: 77 BPM | OXYGEN SATURATION: 98 % | DIASTOLIC BLOOD PRESSURE: 69 MMHG | SYSTOLIC BLOOD PRESSURE: 109 MMHG | RESPIRATION RATE: 15 BRPM

## 2025-08-28 DIAGNOSIS — K80.20 CALCULUS OF GALLBLADDER WITHOUT CHOLECYSTITIS WITHOUT OBSTRUCTION: ICD-10-CM

## 2025-08-28 DIAGNOSIS — Z98.890 OTHER SPECIFIED POSTPROCEDURAL STATES: Chronic | ICD-10-CM

## 2025-08-28 DIAGNOSIS — N40.0 BENIGN PROSTATIC HYPERPLASIA WITHOUT LOWER URINARY TRACT SYMPTOMS: Chronic | ICD-10-CM

## 2025-08-28 LAB — BLD GP AB SCN SERPL QL: SIGNIFICANT CHANGE UP

## 2025-08-28 PROCEDURE — 47562 LAPAROSCOPIC CHOLECYSTECTOMY: CPT | Mod: AS

## 2025-08-28 PROCEDURE — 86850 RBC ANTIBODY SCREEN: CPT

## 2025-08-28 PROCEDURE — 88304 TISSUE EXAM BY PATHOLOGIST: CPT

## 2025-08-28 PROCEDURE — 47562 LAPAROSCOPIC CHOLECYSTECTOMY: CPT

## 2025-08-28 PROCEDURE — 86901 BLOOD TYPING SEROLOGIC RH(D): CPT

## 2025-08-28 PROCEDURE — C9399: CPT

## 2025-08-28 PROCEDURE — 88304 TISSUE EXAM BY PATHOLOGIST: CPT | Mod: 26

## 2025-08-28 PROCEDURE — 88300 SURGICAL PATH GROSS: CPT | Mod: 26,59

## 2025-08-28 PROCEDURE — 86900 BLOOD TYPING SEROLOGIC ABO: CPT

## 2025-08-28 PROCEDURE — C1889: CPT

## 2025-08-28 PROCEDURE — 88300 SURGICAL PATH GROSS: CPT

## 2025-08-28 PROCEDURE — 36415 COLL VENOUS BLD VENIPUNCTURE: CPT

## 2025-08-28 DEVICE — STAPLER COVIDIEN TRI-STAPLE 45MM TAN RELOAD: Type: IMPLANTABLE DEVICE | Status: FUNCTIONAL

## 2025-08-28 DEVICE — CLIP APPLIER COVIDIEN ENDOCLIP II 10MM MED/LG: Type: IMPLANTABLE DEVICE | Status: FUNCTIONAL

## 2025-08-28 DEVICE — IMPLANTABLE DEVICE: Type: IMPLANTABLE DEVICE | Status: FUNCTIONAL

## 2025-08-28 DEVICE — CLIP APPLIER COVIDIEN ENDOCLIP 5MM: Type: IMPLANTABLE DEVICE | Status: FUNCTIONAL

## 2025-08-28 DEVICE — CLIP APPLIER COVIDIEN ENDOCLIP 10MM LARGE: Type: IMPLANTABLE DEVICE | Status: FUNCTIONAL

## 2025-08-28 RX ORDER — OXYCODONE HYDROCHLORIDE 30 MG/1
1 TABLET ORAL
Qty: 10 | Refills: 0
Start: 2025-08-28

## 2025-08-28 RX ORDER — SODIUM CHLORIDE 9 G/1000ML
1000 INJECTION, SOLUTION INTRAVENOUS
Refills: 0 | Status: ACTIVE | OUTPATIENT
Start: 2025-08-28 | End: 2026-07-27

## 2025-08-28 RX ORDER — HYDROMORPHONE/SOD CHLOR,ISO/PF 2 MG/10 ML
0.5 SYRINGE (ML) INJECTION
Refills: 0 | Status: DISCONTINUED | OUTPATIENT
Start: 2025-08-28 | End: 2025-08-28

## 2025-08-28 RX ORDER — SODIUM CHLORIDE 9 G/1000ML
1000 INJECTION, SOLUTION INTRAVENOUS
Refills: 0 | Status: DISCONTINUED | OUTPATIENT
Start: 2025-08-28 | End: 2025-08-28

## 2025-08-28 RX ORDER — HYDROMORPHONE/SOD CHLOR,ISO/PF 2 MG/10 ML
0.25 SYRINGE (ML) INJECTION
Refills: 0 | Status: DISCONTINUED | OUTPATIENT
Start: 2025-08-28 | End: 2025-08-28

## 2025-08-28 RX ORDER — OXYCODONE HYDROCHLORIDE 30 MG/1
5 TABLET ORAL ONCE
Refills: 0 | Status: DISCONTINUED | OUTPATIENT
Start: 2025-08-28 | End: 2025-08-28

## 2025-08-28 RX ORDER — ONDANSETRON HCL/PF 4 MG/2 ML
4 VIAL (ML) INJECTION ONCE
Refills: 0 | Status: DISCONTINUED | OUTPATIENT
Start: 2025-08-28 | End: 2025-08-28

## 2025-08-28 RX ADMIN — SODIUM CHLORIDE 50 MILLILITER(S): 9 INJECTION, SOLUTION INTRAVENOUS at 06:23

## 2025-09-03 ENCOUNTER — APPOINTMENT (OUTPATIENT)
Dept: SURGERY | Facility: CLINIC | Age: 85
End: 2025-09-03
Payer: MEDICARE

## 2025-09-03 DIAGNOSIS — K80.50 CALCULUS OF BILE DUCT W/OUT CHOLANGITIS OR CHOLECYSTITIS W/OUT OBSTRUCTION: ICD-10-CM

## 2025-09-03 PROCEDURE — 99024 POSTOP FOLLOW-UP VISIT: CPT

## 2025-09-05 LAB — SURGICAL PATHOLOGY STUDY: SIGNIFICANT CHANGE UP

## (undated) DEVICE — SOL IRR POUR H2O 1500ML

## (undated) DEVICE — BAG DECANTER IV STERILE

## (undated) DEVICE — DRAPE 3/4 SHEET W REINFORCEMENT 56X77"

## (undated) DEVICE — SUT VICRYL PLUS 0 27" CT-3

## (undated) DEVICE — TAPE GLO-N-TELL RADIOPAQUE 20 STRIPS

## (undated) DEVICE — SOLIDIFIER CANN EXPRESS 3K

## (undated) DEVICE — DRAPE FEMORAL ANGIOGRAPHY W TROUGH

## (undated) DEVICE — SOL IRR POUR NS 0.9% 500ML

## (undated) DEVICE — SYR LUER LOK 10CC

## (undated) DEVICE — Device

## (undated) DEVICE — PREP CHLORAPREP HI-LITE ORANGE 26ML

## (undated) DEVICE — PACK CARD CATH CUSTOM

## (undated) DEVICE — TUBING HYDRO-SURG PLUS IRRIGATOR W SMOKEVAC & PROBE

## (undated) DEVICE — SCOPE WARMER SEAL DISP

## (undated) DEVICE — DRSG TEGADERM 4X4.75"

## (undated) DEVICE — LOK DVC RX AND BIOPSY

## (undated) DEVICE — SYR LUER LOK 3CC

## (undated) DEVICE — LAP PAD 18 X 18"

## (undated) DEVICE — PLV-SCD MACHINE: Type: DURABLE MEDICAL EQUIPMENT

## (undated) DEVICE — STOPCOCK HIGH PRESS 3 WAY

## (undated) DEVICE — KIT ENDO PROCEDURE CUST W/VLV

## (undated) DEVICE — DRAPE TOWEL BLUE 17" X 24"

## (undated) DEVICE — SOL IRR POUR H2O 1000ML

## (undated) DEVICE — SYR LUER LOK 20CC

## (undated) DEVICE — TUBING CAP SET ERBEFLO CLEVERCAP HYBRID CO2 FOR OLYMPUS SCOPES AND UCR

## (undated) DEVICE — MARKING PEN W RULER

## (undated) DEVICE — BLADE SCALPEL SAFETYLOCK #11

## (undated) DEVICE — DRAPE SPLIT SHEET 77" X 108"

## (undated) DEVICE — DRSG STERISTRIPS 0.5 X 4"

## (undated) DEVICE — MEDICATION LABELS W MARKER

## (undated) DEVICE — VALVE CONTROL COPILOT BLEEDBACK

## (undated) DEVICE — WARMING BLANKET UPPER ADULT

## (undated) DEVICE — SPECIMEN CONTAINER 100ML

## (undated) DEVICE — TUBING W FILTER STERILE FOR INSUFFLATION

## (undated) DEVICE — TORQUE DEVICE FOR GUIDEWIRE 0.0100.038"

## (undated) DEVICE — BLADE SCALPEL SAFETY #11 WITH PLASTIC GREEN HANDLE

## (undated) DEVICE — TUBING HI PRESURE NONBRAID M/F 72"

## (undated) DEVICE — TROCAR ETHICON ENDOPATH XCEL BLADELESS 11MM X 100MM STABILITY

## (undated) DEVICE — VISITEC 4X4

## (undated) DEVICE — ENDOCATCH 10MM

## (undated) DEVICE — COVER PROBE W/GEL 18X120CM STRL 50/BX

## (undated) DEVICE — POSITIONER FOAM HEAD CRADLE (PINK)

## (undated) DEVICE — VENODYNE/SCD SLEEVE CALF MEDIUM

## (undated) DEVICE — TUBING INSUFLATOR VIDEO TOWER NON HEATED

## (undated) DEVICE — DRAPE HAND 77" X 146"

## (undated) DEVICE — INJ SYS RAP REFIL

## (undated) DEVICE — GLV 6.5 PROTEXIS (WHITE)

## (undated) DEVICE — PRESSURE INFUSOR BAG 1000ML

## (undated) DEVICE — POSITIONER STRAP ARMBOARD VELCRO TS-30

## (undated) DEVICE — DRSG CURITY GAUZE SPONGE 4 X 4" 12-PLY

## (undated) DEVICE — STAPLER SKIN PROXIMATE

## (undated) DEVICE — PACK MINOR

## (undated) DEVICE — INFLATOR ENCORE 26

## (undated) DEVICE — DISSECTOR ENDO PEANUT 5MM

## (undated) DEVICE — INSUFFLATION NDL COVIDIEN STEP 14G FOR STEP/VERSASTEP

## (undated) DEVICE — TUBING IV EXTENSION 30"

## (undated) DEVICE — TROCAR ETHICON ENDOPATH XCEL BLADELESS 5MM X 100MM STABILITY

## (undated) DEVICE — SPECIMEN CONTAINER PET